# Patient Record
Sex: FEMALE | Race: WHITE | NOT HISPANIC OR LATINO | Employment: FULL TIME | URBAN - METROPOLITAN AREA
[De-identification: names, ages, dates, MRNs, and addresses within clinical notes are randomized per-mention and may not be internally consistent; named-entity substitution may affect disease eponyms.]

---

## 2018-09-06 ENCOUNTER — ANNUAL EXAM (OUTPATIENT)
Dept: OBGYN CLINIC | Facility: CLINIC | Age: 48
End: 2018-09-06
Payer: COMMERCIAL

## 2018-09-06 VITALS
SYSTOLIC BLOOD PRESSURE: 104 MMHG | HEIGHT: 61 IN | DIASTOLIC BLOOD PRESSURE: 62 MMHG | WEIGHT: 125 LBS | BODY MASS INDEX: 23.6 KG/M2

## 2018-09-06 DIAGNOSIS — Z01.419 ENCNTR FOR GYN EXAM (GENERAL) (ROUTINE) W/O ABN FINDINGS: Primary | ICD-10-CM

## 2018-09-06 DIAGNOSIS — R10.2 PELVIC PRESSURE IN FEMALE: ICD-10-CM

## 2018-09-06 DIAGNOSIS — Z12.31 ENCOUNTER FOR SCREENING MAMMOGRAM FOR MALIGNANT NEOPLASM OF BREAST: ICD-10-CM

## 2018-09-06 LAB
BACTERIA UR QL AUTO: NORMAL /HPF
BILIRUB UR QL STRIP: NEGATIVE
CLARITY UR: CLEAR
COLOR UR: YELLOW
GLUCOSE UR STRIP-MCNC: NEGATIVE MG/DL
HGB UR QL STRIP.AUTO: NEGATIVE
HYALINE CASTS #/AREA URNS LPF: NORMAL /LPF
KETONES UR STRIP-MCNC: NEGATIVE MG/DL
LEUKOCYTE ESTERASE UR QL STRIP: NEGATIVE
NITRITE UR QL STRIP: NEGATIVE
NON-SQ EPI CELLS URNS QL MICRO: NORMAL /HPF
PH UR STRIP.AUTO: 7 [PH] (ref 4.5–8)
PROT UR STRIP-MCNC: NEGATIVE MG/DL
RBC #/AREA URNS AUTO: NORMAL /HPF
SP GR UR STRIP.AUTO: 1.01 (ref 1–1.03)
UROBILINOGEN UR QL STRIP.AUTO: 0.2 E.U./DL
WBC #/AREA URNS AUTO: NORMAL /HPF

## 2018-09-06 PROCEDURE — 87624 HPV HI-RISK TYP POOLED RSLT: CPT | Performed by: PHYSICIAN ASSISTANT

## 2018-09-06 PROCEDURE — 81001 URINALYSIS AUTO W/SCOPE: CPT | Performed by: PHYSICIAN ASSISTANT

## 2018-09-06 PROCEDURE — 99396 PREV VISIT EST AGE 40-64: CPT | Performed by: PHYSICIAN ASSISTANT

## 2018-09-06 PROCEDURE — G0145 SCR C/V CYTO,THINLAYER,RESCR: HCPCS | Performed by: PHYSICIAN ASSISTANT

## 2018-09-06 PROCEDURE — 87086 URINE CULTURE/COLONY COUNT: CPT | Performed by: PHYSICIAN ASSISTANT

## 2018-09-06 NOTE — PROGRESS NOTES
Lukas Memos  1970      CC:  Yearly exam    S:  49 y o  female here for yearly exam  Her cycles are regular, not heavy or crampy  She is sexually active  She uses nothing for contraception  She still would very much like a pregnancy  She did undergo another IVF procedure since seen last two years ago, with a donor egg, and that failed as well  She is still considering going to another facility to see about another IVF procedure  We also discussed donor embryo, adoption, and surrogacy  She notes some lower abdominal pressure and low back discomfort for the past six weeks off and on  She is sexually active without pain or bleeding     Last Pap 2015 neg/neg  Last Mammo never    No current outpatient prescriptions on file  Social History     Social History    Marital status: Single     Spouse name: N/A    Number of children: N/A    Years of education: N/A     Occupational History    Not on file  Social History Main Topics    Smoking status: Never Smoker    Smokeless tobacco: Never Used    Alcohol use 1 2 oz/week     2 Glasses of wine per week    Drug use: No    Sexual activity: Yes     Partners: Male     Birth control/ protection: Male Sterilization     Other Topics Concern    Not on file     Social History Narrative    No narrative on file     Family History   Problem Relation Age of Onset    Colon cancer Mother     Hypertension Mother     Diabetes Mother       Past Medical History:   Diagnosis Date    Abnormal Pap smear of cervix     Urinary tract infection         O:  Blood pressure 104/62, height 5' 1 02" (1 55 m), weight 56 7 kg (125 lb), last menstrual period 08/09/2018  Patient appears well and is not in distress  Neck is supple without masses  Breasts are symmetrical without mass, tenderness, nipple discharge, skin changes or adenopathy  Abdomen is soft and nontender without masses  External genitals are normal without lesions or rashes    Vagina is normal without discharge or bleeding  Cervix is normal without discharge or lesion  Uterus is normal, mobile, nontender without palpable mass  Adnexa are normal, nontender, without palpable mass  A:  Yearly exam      P:   Pap and HPV today    Check urinalysis, urine culture    Mammo slip provided    RTO one year for yearly exam or sooner as needed

## 2018-09-07 LAB — BACTERIA UR CULT: NORMAL

## 2018-09-10 LAB
HPV HR 12 DNA CVX QL NAA+PROBE: NEGATIVE
HPV16 DNA CVX QL NAA+PROBE: NEGATIVE
HPV18 DNA CVX QL NAA+PROBE: NEGATIVE
LAB AP GYN PRIMARY INTERPRETATION: NORMAL
Lab: NORMAL

## 2018-12-06 ENCOUNTER — TELEPHONE (OUTPATIENT)
Dept: OBGYN CLINIC | Facility: CLINIC | Age: 48
End: 2018-12-06

## 2018-12-06 DIAGNOSIS — Z31.69 ENCOUNTER FOR PRECONCEPTION CONSULTATION: Primary | ICD-10-CM

## 2018-12-06 NOTE — TELEPHONE ENCOUNTER
Tried to call pt - # on file is not accepting calls  Mailed letter to pt that we are trying to contact her

## 2018-12-06 NOTE — TELEPHONE ENCOUNTER
----- Message from Ray Medina PA-C sent at 12/6/2018  5:08 PM EST -----  Please let [facility] know that I received a letter from her infertility doctor asking me to fill out a form indicating that she is cleared to proceed with IVF  I would like [facility] to have an appointment with [facility] for a preconception visit  Once that is complete, we can then work on filling out this form  Please help her make an appointment with [facility]

## 2018-12-11 NOTE — TELEPHONE ENCOUNTER
Spoke with pt - explained to her that Julia Gabriel recommends she see MFM to a preconception consult first  Referral entered into chart and gave her the number to make an appointment

## 2018-12-12 ENCOUNTER — OFFICE VISIT (OUTPATIENT)
Dept: PERINATAL CARE | Facility: CLINIC | Age: 48
End: 2018-12-12
Payer: COMMERCIAL

## 2018-12-12 VITALS
SYSTOLIC BLOOD PRESSURE: 116 MMHG | HEIGHT: 62 IN | WEIGHT: 127.9 LBS | DIASTOLIC BLOOD PRESSURE: 78 MMHG | BODY MASS INDEX: 23.53 KG/M2 | HEART RATE: 64 BPM

## 2018-12-12 DIAGNOSIS — N97.9 INFERTILITY, FEMALE: Primary | ICD-10-CM

## 2018-12-12 PROCEDURE — 99243 OFF/OP CNSLTJ NEW/EST LOW 30: CPT | Performed by: OBSTETRICS & GYNECOLOGY

## 2018-12-12 NOTE — PROGRESS NOTES
[facility] - Medical Clearance   Bushra Ballesteros 51 y o  female MRN: [id number]  Unit/Bed#:  Encounter: [id number]      History of Present Illness     Chief Complaint: Patient is here for medical clearance for fertility treatment - donor egg  HPI:  Bushra Ballesteros is a 51 y o  Juan Carlos Jin presenting to [facility] for medical clearance for fertility treatment - donor egg  Patient is well appearing and without any current complaints  She reports 3 prior cycles of IVF (failed, D+C, failed)  Denies any personal history of cardiovasuclar/pulmonary disease or history of VTE  For her last IVF cycle (2015), she was evaluated by cardiology and cleared following stress test showing - low risk  Patient reports a balanced diet, exercises regularly, and denies tobacco or illicit drug use, rarely consumes alcohol  OBHx: 3 cycles IVF (1st and 3rd "did not take", 2nd required D+C)    GYNHx: Denies hx of fibroids, ovarian cysts; reports hx of HPV + on pap "many years'" ago, however, multiple normal paps  No hx of cervical procedures/surgery  She was on OCPs(20 -30s), depo (early 35s) then used condoms  Her  has had a vasectomy but proven male fertility  FMHx: Maternal - DM; hx of LLE DVT at 79yo: patient's mother was subsequently placed on Xarelto, after which time she developed a GI bleed and was found to have intestinal cancer on colonoscopy - likely VTE inciting event  Following biopsy at [facility], patient was not informed of any genetic component to cancer status  Paternal: unknown    Review of Systems   Constitutional: Negative for chills, diaphoresis, fatigue and fever  HENT: Negative for rhinorrhea, sinus pain, sinus pressure, sneezing, sore throat and tinnitus  Respiratory: Negative for cough, chest tightness, shortness of breath and wheezing  Cardiovascular: Negative for chest pain, palpitations and leg swelling     Gastrointestinal: Negative for abdominal pain, constipation, diarrhea, nausea and vomiting  Genitourinary: Negative for dysuria, frequency, genital sores, vaginal bleeding, vaginal discharge and vaginal pain  Musculoskeletal: Negative for arthralgias, back pain, myalgias and neck stiffness  Neurological: Negative for dizziness, weakness, light-headedness and headaches  Psychiatric/Behavioral: Negative for agitation, behavioral problems, self-injury and suicidal ideas  The patient is not nervous/anxious  Historical Information   Past Medical History:   Diagnosis Date    Abnormal Pap smear of cervix     Urinary tract infection      Past Surgical History:   Procedure Laterality Date    DILATION AND CURETTAGE OF UTERUS  2015    WISDOM TOOTH EXTRACTION       Social History   History   Alcohol Use    1 2 oz/week    2 Glasses of wine per week     History   Drug Use No     History   Smoking Status    Never Smoker   Smokeless Tobacco    Never Used     Family History: Maternal DM; DVT; as reported above    Meds/Allergies       No Known Allergies    OBJECTIVE:    Vitals: Blood pressure 116/78, pulse 64, height 5' 2" (1 575 m), weight 58 kg (127 lb 14 4 oz), last menstrual period 08/09/2018  Body mass index is 23 39 kg/m²  Physical Exam   Constitutional: She is oriented to person, place, and time  She appears well-developed and well-nourished  No distress  Eyes: EOM are normal  Right eye exhibits no discharge  Left eye exhibits no discharge  No scleral icterus  Neck: No JVD present  No tracheal deviation present  No thyromegaly present  Cardiovascular: Normal rate, regular rhythm, normal heart sounds and intact distal pulses  Pulmonary/Chest: Effort normal and breath sounds normal  No respiratory distress  She has no wheezes  Lymphadenopathy:     She has no cervical adenopathy  Neurological: She is alert and oriented to person, place, and time  She exhibits normal muscle tone  Coordination normal    Skin: Skin is warm and dry  No rash noted   She is not diaphoretic  No erythema  Psychiatric: She has a normal mood and affect  Her behavior is normal  Judgment and thought content normal       PREVENTATIVE CARE TESTING:         Ref Range & Units 18 1808   HPV Other HR Negative Negative    Comment: HPV types: 44,69,40,44,19,00,43,84,78,12,17 and 68 DNA are undetectable or below the pre-set threshold  HPV16 Negative Negative    HPV18 Negative Negative           Negative for intraepithelial lesion or malignancy    Last Mammo: 9/11/15  No dominant soft tissue mass, architectural distortion or    suspicious calcifications are noted  The skin and nipple    contours are within normal limits  IMPRESSION-    No evidence of malignancy  No significant changes   when compared with prior studies  ASSESSMENT- BiRad-1 - Negative     Assessment/Plan     [facility] 51 y o  , healthy premenopausal female for donor egg IVF      - She medically cleared to proceed with this fertility treatment for our standpoint    - Discussed with patient the potential risks associated with maternal age of 51 and her [address] of DM -- including developing hypertensive disease, diabetes, preeclampsia, VTE, placental pathology, maternal morbidity, fetal demise, and increased risk for  delivery  - Patient states that she understands all topics as discussed above and desires to proceed with treatment  - Patient is scheduled for mammo through her Humberto Monae MD  OB/GYN PGY-3  2018 3:05 PM

## 2018-12-18 ENCOUNTER — TELEPHONE (OUTPATIENT)
Dept: OBGYN CLINIC | Facility: CLINIC | Age: 48
End: 2018-12-18

## 2018-12-18 ENCOUNTER — TRANSCRIBE ORDERS (OUTPATIENT)
Dept: REGISTRATION | Facility: HOSPITAL | Age: 48
End: 2018-12-18

## 2018-12-18 LAB — HCV AB SER-ACNC: NORMAL

## 2018-12-18 NOTE — TELEPHONE ENCOUNTER
Spoke with pt - she was given a script from the  center to have a hysterosalpingogram (HSG)  Advised patient she will need to call central scheduling to make that appointment since they ordered it  Transferred pt to CS

## 2018-12-18 NOTE — TELEPHONE ENCOUNTER
Pt was last seen 9/2018 by Charlotte Odom, [address] states she needs to have an HCG  done within the next two days per DR Augusto Corcoran ( Her fertility doctor), pls call pt to advise if her fertility doc should get this or we will

## 2018-12-19 ENCOUNTER — TRANSCRIBE ORDERS (OUTPATIENT)
Dept: ADMINISTRATIVE | Facility: HOSPITAL | Age: 48
End: 2018-12-19

## 2018-12-19 DIAGNOSIS — Z31.41 FERTILITY TESTING: Primary | ICD-10-CM

## 2019-01-07 ENCOUNTER — HOSPITAL ENCOUNTER (OUTPATIENT)
Dept: RADIOLOGY | Facility: HOSPITAL | Age: 49
Discharge: HOME | End: 2019-01-07
Payer: COMMERCIAL

## 2019-01-07 VITALS — HEIGHT: 62 IN | WEIGHT: 125 LBS | BODY MASS INDEX: 23 KG/M2

## 2019-01-07 DIAGNOSIS — Z12.31 ENCOUNTER FOR SCREENING MAMMOGRAM FOR MALIGNANT NEOPLASM OF BREAST: ICD-10-CM

## 2019-01-07 PROCEDURE — 77067 SCR MAMMO BI INCL CAD: CPT

## 2019-08-26 ENCOUNTER — INITIAL PRENATAL (OUTPATIENT)
Dept: OBGYN CLINIC | Facility: CLINIC | Age: 49
End: 2019-08-26

## 2019-08-26 VITALS — HEIGHT: 62 IN | BODY MASS INDEX: 23.48 KG/M2 | WEIGHT: 127.6 LBS

## 2019-08-26 DIAGNOSIS — Z34.81 PRENATAL CARE, SUBSEQUENT PREGNANCY, FIRST TRIMESTER: Primary | ICD-10-CM

## 2019-08-26 PROCEDURE — OBC: Performed by: OBSTETRICS & GYNECOLOGY

## 2019-08-26 RX ORDER — PNV NO.95/FERROUS FUM/FOLIC AC 28MG-0.8MG
TABLET ORAL
COMMUNITY
End: 2021-06-10 | Stop reason: ALTCHOICE

## 2019-08-26 RX ORDER — ESTRADIOL 1 MG/1
1 TABLET ORAL 3 TIMES DAILY
COMMUNITY
End: 2019-09-10 | Stop reason: ALTCHOICE

## 2019-08-26 NOTE — PROGRESS NOTES
OB INTAKE INTERVIEW  * Pt presents for OB intake  * Accompanied by: self  *  *Hx of  delivery prior to 36 weeks 6 days no  *Last Menstrual Period: Pt's LMP was 5/10/19  *Ultrasound date:19   9weeks 0days  *Estimated date of delivery: 3/23/20   * confirmed by US    *Signs/Symptoms of Pregnancy      *constipation no   *headaches no   *cramping/spotting no   *PICA cravings no  *Diabetes- if you answer yes, please order 1 hour GTT, 50g   *hx of GDM no   *BMI >35 no   *first degree relative with type 2 diabetes yes   *hx of PCOS no   *current metformin use no   *prior hx of LGA/macrosomia no   *AMA with other risk factors yes  *Hypertension- if you answer yes, please order preeclampsia labs including 24 hour urine protein   *Hx of chronic HTN no   *hx of gestational HTN no   *hx of preeclampsia, eclampsia, or HELLP syndrome no  *Infection Screening-    *does the pt have a hx of MRSA? no   *if yes- please follow MRSA protocol and obtain a nasal swab for MRSA culture   *history of herpes? no  *Immunizations:   *influenza vaccine given today no   *discussed Tdap vaccine    *Interview education   *[facility] Pregnancy Essentials Book reviewed and discussed   *Handouts given:    *Baby and Me phone joshua guide    *Baby and Me support center    *[facility]     *discussed genetic testing- pt interested yes     *appointment at [facility] made yes    *Prenatal lab work scripts    *Nurse/Family Partnership- pt may qualify no; referral placed no  *I have these concerns about this prenatal patient: IVF pt  *Details that I feel the provider should be aware of: egg donor    PN1 visit scheduled  The patient was oriented to our practice and all questions were answered  [facility]  IVF, egg donor  Records & labs scanned to chart  appt scheduled at [facility]  Referral entered  pn bldwk ordered  - ua , C &S, & one hour glucola  Pn 1 visit scheduled      Interviewed by: Lyle Wright Zack Cast

## 2019-09-09 ENCOUNTER — APPOINTMENT (OUTPATIENT)
Dept: LAB | Facility: CLINIC | Age: 49
End: 2019-09-09
Payer: COMMERCIAL

## 2019-09-09 DIAGNOSIS — Z34.81 PRENATAL CARE, SUBSEQUENT PREGNANCY, FIRST TRIMESTER: ICD-10-CM

## 2019-09-09 LAB
BACTERIA UR QL AUTO: ABNORMAL /HPF
BILIRUB UR QL STRIP: NEGATIVE
CAOX CRY URNS QL MICRO: ABNORMAL /HPF
CLARITY UR: ABNORMAL
COLOR UR: YELLOW
GLUCOSE 1H P 50 G GLC PO SERPL-MCNC: 139 MG/DL
GLUCOSE UR STRIP-MCNC: NEGATIVE MG/DL
HGB UR QL STRIP.AUTO: NEGATIVE
KETONES UR STRIP-MCNC: NEGATIVE MG/DL
LEUKOCYTE ESTERASE UR QL STRIP: NEGATIVE
NITRITE UR QL STRIP: NEGATIVE
NON-SQ EPI CELLS URNS QL MICRO: ABNORMAL /HPF
PH UR STRIP.AUTO: 6 [PH]
PROT UR STRIP-MCNC: NEGATIVE MG/DL
RBC #/AREA URNS AUTO: ABNORMAL /HPF
SP GR UR STRIP.AUTO: 1.02 (ref 1–1.03)
UROBILINOGEN UR QL STRIP.AUTO: 0.2 E.U./DL
WBC #/AREA URNS AUTO: ABNORMAL /HPF

## 2019-09-09 PROCEDURE — 36415 COLL VENOUS BLD VENIPUNCTURE: CPT

## 2019-09-09 PROCEDURE — 81001 URINALYSIS AUTO W/SCOPE: CPT

## 2019-09-09 PROCEDURE — 87086 URINE CULTURE/COLONY COUNT: CPT

## 2019-09-09 PROCEDURE — 82950 GLUCOSE TEST: CPT

## 2019-09-10 ENCOUNTER — TELEPHONE (OUTPATIENT)
Dept: OBGYN CLINIC | Facility: CLINIC | Age: 49
End: 2019-09-10

## 2019-09-10 ENCOUNTER — INITIAL PRENATAL (OUTPATIENT)
Dept: OBGYN CLINIC | Facility: CLINIC | Age: 49
End: 2019-09-10
Payer: COMMERCIAL

## 2019-09-10 VITALS — WEIGHT: 130 LBS | DIASTOLIC BLOOD PRESSURE: 70 MMHG | SYSTOLIC BLOOD PRESSURE: 112 MMHG | BODY MASS INDEX: 23.78 KG/M2

## 2019-09-10 DIAGNOSIS — Z23 NEED FOR INFLUENZA VACCINATION: Primary | ICD-10-CM

## 2019-09-10 DIAGNOSIS — O99.810 ABNORMAL GLUCOSE AFFECTING PREGNANCY: ICD-10-CM

## 2019-09-10 DIAGNOSIS — O09.519 HIGH-RISK PREGNANCY, PRIMIGRAVIDA OF ADVANCED MATERNAL AGE: ICD-10-CM

## 2019-09-10 DIAGNOSIS — O09.819 PREGNANCY RESULTING FROM ASSISTED REPRODUCTIVE TECHNOLOGY, ANTEPARTUM: ICD-10-CM

## 2019-09-10 DIAGNOSIS — R73.09 ELEVATED GLUCOSE LEVEL: Primary | ICD-10-CM

## 2019-09-10 DIAGNOSIS — O09.91 PREGNANCY, SUPERVISION, HIGH-RISK, FIRST TRIMESTER: ICD-10-CM

## 2019-09-10 DIAGNOSIS — Z34.91 ENCOUNTER FOR PREGNANCY RELATED EXAMINATION IN FIRST TRIMESTER: ICD-10-CM

## 2019-09-10 LAB — BACTERIA UR CULT: NORMAL

## 2019-09-10 PROCEDURE — PNV: Performed by: NURSE PRACTITIONER

## 2019-09-10 PROCEDURE — 90471 IMMUNIZATION ADMIN: CPT | Performed by: NURSE PRACTITIONER

## 2019-09-10 PROCEDURE — 90686 IIV4 VACC NO PRSV 0.5 ML IM: CPT | Performed by: NURSE PRACTITIONER

## 2019-09-10 NOTE — PROGRESS NOTES
Problem List Items Addressed This Visit        Other    Pregnancy, supervision, high-risk, first trimester     PN1  Planned pregnancy resulting from IVF with donor egg  Some records present under media tab from [facility]    #1: IVF - 1st trimester MAB -> D&C     See additional subheadings re: the following:  - AMA  - abn glucola   - h/o ART    **EDC of 3/25/20 entered per 1st trimester US report under media tab  The patient denies complaints  Denies pelvic pain, bleeding, LOF  Exam WNL   by limited bedside US  Prenatal labs WNL  Rh pos  RPR added today, as this was not previously ordered, and baseline preE labs were also advised  Pap up to date and gc/chl sent today  Scheduled with MFM tomorrow for genetics counseling and perinatology consultation to discuss management of high risk preg  Reviewed diet and activity recommendations, practice set up, visit intervals and reasons to call  RTO in 4 weeks or sooner PRN  **Flu vaccine administered today  Relevant Orders    RPR    Abnormal glucose affecting pregnancy     Elevated 1hr glucola  Recommened 3hr gtt  Relevant Orders    Glucose KODI 3HR 100GM PREG PTS    High-risk pregnancy, primigravida of advanced maternal age     Reviewed AMA considerations at length  Oksana Abril reports she is scheduled for genetics and  consultation at [facility] tomorrow  She reports genetic testing was not performed on donor eggs or embryos, and she intends to proceed with cffDNA  Advised baseline preE labs at this time  Will defer to MFM for daily LDASA recommendations  Recommended low threshold for reporting any complaints  Emotional support provided, as she is understandably anxious about the health of the pregnancy            Relevant Orders    Protein / creatinine ratio, urine    Uric acid    Comprehensive metabolic panel    Pregnancy resulting from assisted reproductive technology, antepartum     Current preg resulting from IVF with donor egg  Discussed recommendations for fetal echo at 24 weeks              Other Visit Diagnoses     Need for influenza vaccination    -  Primary    Relevant Orders    FLUZONE: influenza vaccine, quadrivalent, 0 5 mL (Completed)    Encounter for pregnancy related examination in first trimester        Relevant Orders    POCT urine dip

## 2019-09-10 NOTE — TELEPHONE ENCOUNTER
Patient is aware of her results and that she needs to go for a 3hr GTT due to elevated glucose  Explained it is fasting and done at 3 locations - [facility], [facility]  Order in pt chart

## 2019-09-10 NOTE — TELEPHONE ENCOUNTER
----- Message from Anurag oK MD sent at 9/10/2019  2:47 PM EDT -----  Elevated 1 hour glucola, will need 3 h GTT

## 2019-09-10 NOTE — ASSESSMENT & PLAN NOTE
PN1  Planned pregnancy resulting from IVF with donor egg  Some records present under media tab from [facility]    #1: IVF - 1st trimester MAB -> D&C     See additional subheadings re: the following:  - AMA  - abn glucola   - h/o ART    **EDC of 3/25/20 entered per 1st trimester US report under media tab  The patient denies complaints  Denies pelvic pain, bleeding, LOF  Exam WNL   by limited bedside US  Prenatal labs WNL  Rh pos  RPR added today, as this was not previously ordered, and baseline preE labs were also advised  Pap up to date and gc/chl sent today  Scheduled with MFM tomorrow for genetics counseling and perinatology consultation to discuss management of high risk preg  Reviewed diet and activity recommendations, practice set up, visit intervals and reasons to call  RTO in 4 weeks or sooner PRN  **Flu vaccine administered today

## 2019-09-10 NOTE — ASSESSMENT & PLAN NOTE
Reviewed AMA considerations at length  Jak Moctezuma reports she is scheduled for genetics and  consultation at [facility] tomorrow  She reports genetic testing was not performed on donor eggs or embryos, and she intends to proceed with cffDNA  Advised baseline preE labs at this time  Will defer to MFM for daily LDASA recommendations  Recommended low threshold for reporting any complaints  Emotional support provided, as she is understandably anxious about the health of the pregnancy

## 2019-09-11 ENCOUNTER — ROUTINE PRENATAL (OUTPATIENT)
Dept: PERINATAL CARE | Facility: CLINIC | Age: 49
End: 2019-09-11
Payer: COMMERCIAL

## 2019-09-11 VITALS
BODY MASS INDEX: 24 KG/M2 | SYSTOLIC BLOOD PRESSURE: 104 MMHG | HEIGHT: 62 IN | WEIGHT: 130.4 LBS | HEART RATE: 73 BPM | DIASTOLIC BLOOD PRESSURE: 71 MMHG

## 2019-09-11 DIAGNOSIS — O09.521 MULTIGRAVIDA OF ADVANCED MATERNAL AGE IN FIRST TRIMESTER: Primary | ICD-10-CM

## 2019-09-11 DIAGNOSIS — Z34.81 PRENATAL CARE, SUBSEQUENT PREGNANCY, FIRST TRIMESTER: ICD-10-CM

## 2019-09-11 DIAGNOSIS — O09.819 PREGNANCY RESULTING FROM ASSISTED REPRODUCTIVE TECHNOLOGY, ANTEPARTUM: ICD-10-CM

## 2019-09-11 DIAGNOSIS — Z36.82 ENCOUNTER FOR NUCHAL TRANSLUCENCY TESTING: ICD-10-CM

## 2019-09-11 DIAGNOSIS — Z3A.12 12 WEEKS GESTATION OF PREGNANCY: ICD-10-CM

## 2019-09-11 LAB
SL AMB  POCT GLUCOSE, UA: NEGATIVE
SL AMB POCT URINE PROTEIN: NEGATIVE

## 2019-09-11 PROCEDURE — 76801 OB US < 14 WKS SINGLE FETUS: CPT | Performed by: OBSTETRICS & GYNECOLOGY

## 2019-09-11 PROCEDURE — 99242 OFF/OP CONSLTJ NEW/EST SF 20: CPT | Performed by: OBSTETRICS & GYNECOLOGY

## 2019-09-11 PROCEDURE — 76813 OB US NUCHAL MEAS 1 GEST: CPT | Performed by: OBSTETRICS & GYNECOLOGY

## 2019-09-11 RX ORDER — ASPIRIN 81 MG/1
162 TABLET ORAL DAILY
Qty: 180 TABLET | Refills: 3 | Status: SHIPPED | OUTPATIENT
Start: 2019-09-11 | End: 2020-03-21 | Stop reason: HOSPADM

## 2019-09-11 NOTE — PROGRESS NOTES
CONSULT NOTE    Carlos Guzman MD  [address] [facility], [address]     Thank you for referring your Freida Amado for a Maternal-Fetal Medicine Consultation:  Below is my consultation  Thank you very much for requesting a consultation on this very nice patient for the indication of genetic screening in the setting of advanced maternal age  Denisa Avina is 51years old  She will be 49 at her EDC  This pregnancy was conceived utilizing in vitro fertilization from an egg donor who is 32years old  The father of the baby is 54years old  The patient did not undergo prenatal genetic screening but did undergo Universal carrier screening  She has a history of 2 prior first-trimester miscarriages with similarly conceived pregnancies  The patient denies any significant medical or surgical history otherwise  She denies the current use of tobacco, alcohol, or drugs  She currently takes prenatal vitamins and has no known drug allergies  There is a family history significant for diabetes  The patient was found to be a carrier for alpha thalassemia  A review of systems is otherwise negative  We discussed the options for genetic screening, including but not limited to first trimester screening, second trimester screening, combined first and second trimester screening, noninvasive prenatal testing (NIPT) for patients at high risk and diagnostic screening through the use of CVS and amniocentesis  We discussed the risks and benefits of each approach including the sensitivities and false positive rates as well as the difference between a screening test and a diagnostic test   At the conclusion of our discussion the patient elected Sequential Screening to delineate her risk for fetal aneuploidy  A maternal blood sample was obtained on the day of the ultrasound    The first trimester portion of the screening results, encompassing age, nuchal translucency, and biochemistry should be available within one week of testing and will be reported from [facility]   The second stage of sequential screening should be completed between the 15th and 21st week of pregnancy (ideally between 16-18 weeks)  We will call the patient with any and all results and the results should be available in the EMR  We discussed that in vitro fertilization increases the risk for fetal congenital heart disease and recommend a fetal echocardiogram between 22 and 24 weeks  Given the patient's history of nulliparity, AMA, Donor Ovum, I recommend initiating low dose aspirin therapy  A recent meta-analysis yielded risk reductions of 24% for preeclampsia, 20% for intrauterine growth restriction, and 14% for  birth, with an absolute risk reduction of 2-5% for preeclampsia, one to 5% for intrauterine growth restriction, and 2-4% for  birth  In this study, there was no identified risk of harm to the mother or fetus but long-term evidence was somewhat limited  Given the overall safety profile and risk-benefit analysis, I recommend 162 mg of aspirin be taken Daily until delivery  I reviewed these recommendations with the patient and answered all of her questions to apparent satisfaction  We discussed the increased risks of adverse outcomes in those pregnancies carried by mild greater than the age of 36 and especially the age of 39  There appears to be an increased risk for adverse outcomes often times related to, but not limited to, comorbidities including hypertension and diabetes  The patient will have baseline preeclampsia labs drawn and she had early screening for diabetes  Her 1 hour glucose was slightly elevated at 137 and she will complete a 3 hour glucose tolerance test   If that is normal, repeat 3 hour glucose tolerance testing is recommended at around 26 weeks    Despite no comorbidities, there does appear to be an increased risk for fetal growth restriction and stillbirth secondary to    We discussed follow-up in detail and I recommend an anatomy ultrasound be scheduled for 20 weeks gestation  An echocardiogram will be performed at around 22 weeks  Serial growth evaluations thereafter with  surveillance near term  Thank you very much for allowing us to participate in the care of this very nice patient  Should you have any questions, please do not hesitate to contact our office  Please note, in addition to the time spent discussing the results of the ultrasound, I spent approximately 30 minutes of face-to-face time with the patient, greater than 50% of which was spent in counseling and the coordination of care for this patient  Portions of the record may have been created with voice recognition software  Occasional wrong word or "sound a like" substitutions may have occurred due to the inherent limitations of voice recognition software  Read the chart carefully and recognize, using context, where substitutions have occurred  Hao Espinoza MD  Attending Physician, Nisa

## 2019-09-17 ENCOUNTER — TELEPHONE (OUTPATIENT)
Dept: PERINATAL CARE | Facility: CLINIC | Age: 49
End: 2019-09-17

## 2019-09-17 NOTE — LETTER
09/17/19  Marshfield Puffer  1970    Thank you for completing Part 1 of your Sequential Screen  To obtain a complete test result, please complete blood work for Part 2 Sequential Screen between the weeks of 10/5/19 to 10/19/19  Based on your insurance coverage, please use one of the following locations  Call our office for any questions at [phone]      [address]   [address] [facility], [facility], [address]    Phone: [address]  [address] [facility], [address]    Phone: [address], [address] [facility]  Phone: [phone] [address], located within [facility], [address]   Phone: [phone] (*ask for lab)    [facility]  [address], [facility], [address] [facility]  Phone: [phone]  Hours: Monday-Friday 6a-6p, Saturday 7a-12p    [address] [facility]  [address]  [facility] [address], [address] [facility]; [place], [address]   Phone: [address] [facility] [address]  [address] [facility] [address]   Phone: [phone]    Sincerely,    Jah Del Cid RN

## 2019-09-17 NOTE — TELEPHONE ENCOUNTER
----- Message from Elissa Arguello MD sent at 9/16/2019  4:02 PM EDT -----  I have reviewed the results which are low risk  Please call patient and notify her of reassuring results if she has not viewed on [facility]  Thank you

## 2019-09-17 NOTE — TELEPHONE ENCOUNTER
I left a message for Johnny Christianson regarding part 1 of her sequential screen results, which was   WNL  I also informed her of the optimal dates to get part 2 drawn and offered the phone number for questions

## 2019-09-22 ENCOUNTER — TELEPHONE (OUTPATIENT)
Dept: LABOR AND DELIVERY | Facility: HOSPITAL | Age: 49
End: 2019-09-22

## 2019-09-22 NOTE — TELEPHONE ENCOUNTER
Pt called afterhours with postcoital bleeding at 13+ weeks  Not heavy flow, but more than spotting   Discussed likely secondary to cervical trauma, less likely SAb, but encouraged pt to continue to monitor, call back if not improved in next couple of hours

## 2019-09-26 NOTE — TELEPHONE ENCOUNTER
Patient called back - 14w1d,  - stated she is no longer bleeding but is having brown spotting   Wants to know if normal

## 2019-09-27 NOTE — TELEPHONE ENCOUNTER
patient returned my call  Pt stated she is no longer bleeding and no longer having brown spotting as of 12 hours  Pt advised old blood normal  Pt has new concern stating its now irritated and itchy external and labia at night time only  No discharge but before bleeding was having clear discharge  Pt states theres a slight odor not fishy but a hormonal before period kind of smell  Pt states has not used any new soaps  Pt confirmed pharmacy on file  Please advise

## 2019-09-30 ENCOUNTER — TELEPHONE (OUTPATIENT)
Dept: OBGYN CLINIC | Facility: CLINIC | Age: 49
End: 2019-09-30

## 2019-09-30 NOTE — TELEPHONE ENCOUNTER
patient returned my call - advised as directed  Pt grateful for the call back and agreed   Pt is scheduled in [facility] for next week on 10/08 1:40pm

## 2019-09-30 NOTE — TELEPHONE ENCOUNTER
----- Message from Daily Anton MD sent at 9/27/2019  4:15 PM EDT -----  Please let the patient know that her symptoms are normal after having some bleeding and that she should not be concerned  She should have follow up at some point next week

## 2019-10-07 ENCOUNTER — APPOINTMENT (OUTPATIENT)
Dept: LAB | Facility: HOSPITAL | Age: 49
End: 2019-10-07
Payer: COMMERCIAL

## 2019-10-07 DIAGNOSIS — O09.91 PREGNANCY, SUPERVISION, HIGH-RISK, FIRST TRIMESTER: ICD-10-CM

## 2019-10-07 DIAGNOSIS — O99.810 ABNORMAL GLUCOSE AFFECTING PREGNANCY: ICD-10-CM

## 2019-10-07 DIAGNOSIS — O09.519 HIGH-RISK PREGNANCY, PRIMIGRAVIDA OF ADVANCED MATERNAL AGE: ICD-10-CM

## 2019-10-07 DIAGNOSIS — R73.09 ELEVATED GLUCOSE LEVEL: ICD-10-CM

## 2019-10-07 LAB
ALBUMIN SERPL BCP-MCNC: 2.9 G/DL (ref 3.5–5)
ALP SERPL-CCNC: 41 U/L (ref 46–116)
ALT SERPL W P-5'-P-CCNC: 43 U/L (ref 12–78)
ANION GAP SERPL CALCULATED.3IONS-SCNC: 4 MMOL/L (ref 4–13)
AST SERPL W P-5'-P-CCNC: 25 U/L (ref 5–45)
BILIRUB SERPL-MCNC: 0.92 MG/DL (ref 0.2–1)
BUN SERPL-MCNC: 7 MG/DL (ref 5–25)
CALCIUM SERPL-MCNC: 8.7 MG/DL (ref 8.3–10.1)
CHLORIDE SERPL-SCNC: 109 MMOL/L (ref 100–108)
CO2 SERPL-SCNC: 25 MMOL/L (ref 21–32)
CREAT SERPL-MCNC: 0.66 MG/DL (ref 0.6–1.3)
CREAT UR-MCNC: 219 MG/DL
GFR SERPL CREATININE-BSD FRML MDRD: 104 ML/MIN/1.73SQ M
GLUCOSE 1H P 100 G GLC PO SERPL-MCNC: 135 MG/DL (ref 65–179)
GLUCOSE 2H P 100 G GLC PO SERPL-MCNC: 152 MG/DL (ref 65–154)
GLUCOSE 3H P 100 G GLC PO SERPL-MCNC: 111 MG/DL (ref 65–139)
GLUCOSE P FAST SERPL-MCNC: 95 MG/DL (ref 65–99)
GLUCOSE P FAST SERPL-MCNC: 97 MG/DL (ref 65–99)
POTASSIUM SERPL-SCNC: 4.1 MMOL/L (ref 3.5–5.3)
PROT SERPL-MCNC: 6.3 G/DL (ref 6.4–8.2)
PROT UR-MCNC: 16 MG/DL
PROT/CREAT UR: 0.07 MG/G{CREAT} (ref 0–0.1)
RPR SER QL: NORMAL
SODIUM SERPL-SCNC: 138 MMOL/L (ref 136–145)
URATE SERPL-MCNC: 3.5 MG/DL (ref 2–6.8)

## 2019-10-07 PROCEDURE — 36415 COLL VENOUS BLD VENIPUNCTURE: CPT

## 2019-10-07 PROCEDURE — 80053 COMPREHEN METABOLIC PANEL: CPT

## 2019-10-07 PROCEDURE — 82570 ASSAY OF URINE CREATININE: CPT

## 2019-10-07 PROCEDURE — 82952 GTT-ADDED SAMPLES: CPT

## 2019-10-07 PROCEDURE — 84550 ASSAY OF BLOOD/URIC ACID: CPT

## 2019-10-07 PROCEDURE — 82951 GLUCOSE TOLERANCE TEST (GTT): CPT

## 2019-10-07 PROCEDURE — 84156 ASSAY OF PROTEIN URINE: CPT

## 2019-10-07 PROCEDURE — 86592 SYPHILIS TEST NON-TREP QUAL: CPT

## 2019-10-08 ENCOUNTER — ROUTINE PRENATAL (OUTPATIENT)
Dept: OBGYN CLINIC | Facility: CLINIC | Age: 49
End: 2019-10-08

## 2019-10-08 VITALS — SYSTOLIC BLOOD PRESSURE: 104 MMHG | DIASTOLIC BLOOD PRESSURE: 56 MMHG | WEIGHT: 133 LBS | BODY MASS INDEX: 24.33 KG/M2

## 2019-10-08 DIAGNOSIS — R73.01 ELEVATED FASTING BLOOD SUGAR: ICD-10-CM

## 2019-10-08 DIAGNOSIS — O09.519 HIGH-RISK PREGNANCY, PRIMIGRAVIDA OF ADVANCED MATERNAL AGE: Primary | ICD-10-CM

## 2019-10-08 PROCEDURE — PNV: Performed by: OBSTETRICS & GYNECOLOGY

## 2019-10-08 NOTE — PROGRESS NOTES
Patient didn't pass her 3 hour GTT  Referred to [facility]  Discuss recent lab work  Did sequential screening and going for part 2 soon  Level 2 scheduled  Had flu shot

## 2019-10-09 ENCOUNTER — TELEPHONE (OUTPATIENT)
Dept: OBGYN CLINIC | Facility: CLINIC | Age: 49
End: 2019-10-09

## 2019-10-09 DIAGNOSIS — R73.01 ELEVATED FASTING GLUCOSE: Primary | ICD-10-CM

## 2019-10-09 NOTE — TELEPHONE ENCOUNTER
Patient is aware of her results and that she is being referred to [facility] due to elevated fasting glucose  Referral entered into chart

## 2019-10-09 NOTE — TELEPHONE ENCOUNTER
----- Message from Monse Rivas, [address] sent at 10/9/2019 10:38 AM EDT -----  Fasting values of 3hr gtt is 95  Please refer to [facility]   Thanks

## 2019-10-14 ENCOUNTER — OFFICE VISIT (OUTPATIENT)
Dept: PERINATAL CARE | Facility: CLINIC | Age: 49
End: 2019-10-14
Payer: COMMERCIAL

## 2019-10-14 VITALS
WEIGHT: 132.8 LBS | SYSTOLIC BLOOD PRESSURE: 108 MMHG | BODY MASS INDEX: 24.44 KG/M2 | DIASTOLIC BLOOD PRESSURE: 69 MMHG | HEART RATE: 80 BPM | HEIGHT: 62 IN

## 2019-10-14 DIAGNOSIS — R73.01 ELEVATED FASTING GLUCOSE: ICD-10-CM

## 2019-10-14 DIAGNOSIS — Z3A.16 16 WEEKS GESTATION OF PREGNANCY: ICD-10-CM

## 2019-10-14 DIAGNOSIS — O24.410 DIET CONTROLLED GESTATIONAL DIABETES MELLITUS (GDM) IN SECOND TRIMESTER: Primary | ICD-10-CM

## 2019-10-14 DIAGNOSIS — O24.419 GESTATIONAL DIABETES MELLITUS (GDM) IN SECOND TRIMESTER, GESTATIONAL DIABETES METHOD OF CONTROL UNSPECIFIED: ICD-10-CM

## 2019-10-14 DIAGNOSIS — O99.810 ABNORMAL GLUCOSE AFFECTING PREGNANCY: Primary | ICD-10-CM

## 2019-10-14 PROCEDURE — G0108 DIAB MANAGE TRN  PER INDIV: HCPCS | Performed by: DIETITIAN, REGISTERED

## 2019-10-14 PROCEDURE — 99214 OFFICE O/P EST MOD 30 MIN: CPT | Performed by: NURSE PRACTITIONER

## 2019-10-14 RX ORDER — BLOOD SUGAR DIAGNOSTIC
STRIP MISCELLANEOUS
Qty: 100 EACH | Refills: 3 | Status: SHIPPED | OUTPATIENT
Start: 2019-10-14 | End: 2020-01-09 | Stop reason: SDUPTHER

## 2019-10-14 NOTE — PATIENT INSTRUCTIONS
1  Start self monitoring blood glucose fasting and 2 hours after start of each meal  Keep glucose log  Glucose goals: fasting 60-90 mg/dL, 135 mg/dL or less 1 hour post meals, and 120 mg/dL or less 2 hours post meal    2  Report glucose readings weekly via [employer]  Report next Monday, 10/21/19 or sooner if needed  3  Start GDM diet with 3 meals and 3 snacks including recommended combination of carb, protein and fat per meal/snack  4  Please eat meal or snack every 2-3 5 hours during the day  5  No more than 8 to 10 hours of fasting overnight  6  Stay active if no restriction from your OB, walk up to 30 minutes a day  7  Always have glucose available to treat hypoglycemia  Use 15:15 rule  Refer to hypoglycemia patient education sheet  Test blood sugar when experiencing signs and symptoms of hypoglycemia and prior to driving  8  Continue prenatal vitamin and baby aspirin as recommended  9  Continue follow-up with your OB and MFM as recommended  10  Check A1c and TSH with reflex free T4  A1c goal 5 6% or less  11  Follow up in: 3 months or sooner if needed

## 2019-10-14 NOTE — PROGRESS NOTES
DATE: 10/14/19   RE: Rocío Fleming   : 1970  JOSE: Estimated Date of Delivery: 3/25/20  EGA:  16w5d    Dear Drs  At [facility]:     Thank you for referring your patient to the Diabetes and Pregnancy Program at [address] [place] [address]  The patient was seen today for medical nutrition therapy for the treatment of gestational  diabetes during pregnancy  In addition to diabetes, the nutrition status is complicated by advanced maternal age  The following was reviewed with the patient:      Weight gain during in pregnancy  Based on the patients height of 62  inches, pre-pregnancy weight of 125 pounds (BMI 22 9) we would recommend a total weight gain of 25-35 pounds for the pregnancy  o The patients current weight is 132 75   pounds, and her weight gain to date is 7 75 pounds   Basic review of macronutrients   Meal pattern should consist of three small meals and three snacks daily   Carbohydrate gram amounts per meal    Instructions on how to read a food label   Appropriate serving size of foods   Incorporating protein at each meal and snack in the importance of protein in relationship to blood glucose control   Individualized meal plan: 1800 calorie gestational diabetes diet   Use of food diary to maintain a meal plan   Sick day guidelines and hypoglycemia with treatment   Breastfeeding guidelines   Post-partum diet recommendations   Report blood glucose levels to [address] weekly or as directed  o Phone: [phone]  If no response in 24 hours, call [phone]   o Fax: [phone]  o Email: [email or web address]   Follow up: Wednesday, 19    Thank you for the opportunity to participate in the care of this patient  I can be reached at [phone] should you have any questions  Time spent with patient 2:10-3:05 PM; time spent face to face counseling greater than 50% of the appointment      Sincerely,     Laura Samayoa  Diabetes Educator  Diabetes and Pregnancy Program

## 2019-10-14 NOTE — PROGRESS NOTES
Assessment/Plan:     Diagnoses and all orders for this visit:    Abnormal glucose affecting pregnancy  -     Hemoglobin A1C; Standing  -     TSH, 3rd generation with Free T4 reflex; Future  -     ONETOUCH VERIO test strip; Test 4 times a day and as instructed  -     ONETOUCH DELICA LANCETS FINE MISC; Use 4 a day and as directed  Gestational diabetes mellitus (GDM) in second trimester, gestational diabetes method of control unspecified  -     Hemoglobin A1C; Standing  -     TSH, 3rd generation with Free T4 reflex; Future  -     ONETOUCH VERIO test strip; Test 4 times a day and as instructed  -     ONETOUCH DELICA LANCETS FINE MISC; Use 4 a day and as directed  16 weeks gestation of pregnancy  -     Hemoglobin A1C; Standing  -     TSH, 3rd generation with Free T4 reflex; Future  -     ONETOUCH VERIO test strip; Test 4 times a day and as instructed  -     ONETOUCH DELICA LANCETS FINE MISC; Use 4 a day and as directed  Elevated fasting glucose  -     Ambulatory referral to Diabetic Education  -     Hemoglobin A1C; Standing  -     TSH, 3rd generation with Free T4 reflex; Future  -     ONETOUCH VERIO test strip; Test 4 times a day and as instructed  -     ONETOUCH DELICA LANCETS FINE MISC; Use 4 a day and as directed  1  Start self monitoring blood glucose fasting and 2 hours after start of each meal  Keep glucose log  Glucose goals: fasting 60-90 mg/dL, 135 mg/dL or less 1 hour post meals, and 120 mg/dL or less 2 hours post meal    2  Report glucose readings weekly via [employer]  Report next Monday, 10/21/19 or sooner if needed  3  Start GDM diet with 3 meals and 3 snacks including recommended combination of carb, protein and fat per meal/snack  4  Please eat meal or snack every 2-3 5 hours during the day  5  No more than 8 to 10 hours of fasting overnight  6  Stay active if no restriction from your OB, walk up to 30 minutes a day  7  Always have glucose available to treat hypoglycemia  Use 15:15 rule  Refer to hypoglycemia patient education sheet  Test blood sugar when experiencing signs and symptoms of hypoglycemia and prior to driving  8  Continue prenatal vitamin and baby aspirin as recommended  9  Continue follow-up with your OB and MFM as recommended  10  Check A1c and TSH with reflex free T4  A1c goal 5 6% or less  11  Follow up in: 3 months or sooner if needed  Insulin requirements during pregnancy discussed  Basal/bolus insulin concept reviewed  Metformin discussed and informed it does cross placenta  Importance of tight glucose control during pregnancy and risk factors as outlined in diabetes and pregnancy booklet discussed  Subjective:      Patient ID: Maynard Gaucher is a 51 y o  female  , JOSE 3/25/20, IVF date 19  Referred for diabetes and pregnancy management  History of infertility and two miscarriages at 8 weeks and 2 weeks  Followed by reproductive endocrinologist        The following portions of the patient's history were reviewed and updated as appropriate: allergies, current medications, past family history, past medical history, past social history, past surgical history and problem list     No Known Allergies  Current Outpatient Medications on File Prior to Visit   Medication Sig Dispense Refill    aspirin (ECOTRIN LOW STRENGTH) 81 mg EC tablet Take 2 tablets (162 mg total) by mouth daily 180 tablet 3    Prenatal Vit-Fe Fumarate-FA (PRENATAL VITAMIN) 28-0 8 mg Take by mouth       No current facility-administered medications on file prior to visit  Review of Systems   Constitutional: Positive for fatigue  Negative for fever  HENT: Positive for congestion  Negative for trouble swallowing  Eyes: Negative for visual disturbance  Respiratory: Negative for cough and shortness of breath  Cardiovascular: Negative  Gastrointestinal: Negative for constipation, nausea and vomiting  Endocrine: Positive for polydipsia and polyuria   Negative for cold intolerance, heat intolerance and polyphagia  Genitourinary: Negative for difficulty urinating and vaginal bleeding  Musculoskeletal: Negative for arthralgias and joint swelling  Skin: Negative for rash  Allergic/Immunologic: Positive for environmental allergies  Negative for food allergies  Neurological: Negative for dizziness, light-headedness and headaches  Psychiatric/Behavioral: Negative for sleep disturbance  Objective:  9/9/19 1 hour , 10/7/19 3 hour GTT 95, 135, 152, 111  FBS 97 on CMP  Component Value Date/Time    HGBA1C 4 4 06/29/2014 0938      /69 (BP Location: Right arm, Patient Position: Sitting, Cuff Size: Standard)   Pulse 80   Ht 5' 2" (1 575 m)   Wt 60 2 kg (132 lb 12 8 oz)   LMP 05/10/2019 (Exact Date)   BMI 24 29 kg/m²      Physical Exam   Constitutional: She appears well-developed and well-nourished  She is cooperative  HENT:   Head: Normocephalic  Eyes: Conjunctivae and lids are normal    Neck: Normal range of motion  Cardiovascular: Normal rate, regular rhythm, S1 normal and S2 normal    Pulmonary/Chest: Effort normal and breath sounds normal    Musculoskeletal: Normal range of motion  Neurological: She is alert  Skin: Skin is warm and dry  Psychiatric: She has a normal mood and affect   Her behavior is normal  Judgment and thought content normal          Time in:1:05 PM  Time out:2:10 PM

## 2019-10-15 NOTE — PROGRESS NOTES
La Nena Lovett is here today for routine PN  Overall feeling well  Failed her 3 hour GTT with her fasting blood sugar, discussed this result with La Nena Lovett and her partner - referral to [facility] given  Discussed risks of untreated GDM  Has follow up at [facility] scheduled  No further bleeding/spotting

## 2019-10-22 ENCOUNTER — DOCUMENTATION (OUTPATIENT)
Dept: PERINATAL CARE | Facility: CLINIC | Age: 49
End: 2019-10-22

## 2019-10-22 ENCOUNTER — TELEPHONE (OUTPATIENT)
Dept: PERINATAL CARE | Facility: CLINIC | Age: 49
End: 2019-10-22

## 2019-10-22 NOTE — PROGRESS NOTES
Date:  10/22/19  RE: Paul Mclean    : 1970  JOSE: Estimated Date of Delivery: 3/25/20  EGA: 17w6d  A1GDNINA, AMA          Addendum: note not completed on 10/22/19  Late entry- Patient had insulin pen education scheduled for 10/23/19 and to start on Levemir 16 units at bedtime         Valentina Lopez, SCAR, CDE  Diabetes Educator   Diabetes and Pregnancy Program

## 2019-10-22 NOTE — TELEPHONE ENCOUNTER
Stated she is varying the times she is taking her FBS from 6 AM to 9 AM in the morning depending if she is working or on a day off  Stated she is up by 6 AM & often does not take her FBS till 9 AM after getting her children off to school  Advised her to always take her FBS at the same time daily  Suggested she take it every day at 6 AM & then eat breakfast later at 9 AM after children are off to school  Stated she is willing to begin insulin to get her BG controlled as soon as possible  The Diabetes  will schedule her insulin education soon

## 2019-10-23 ENCOUNTER — OFFICE VISIT (OUTPATIENT)
Dept: PERINATAL CARE | Facility: CLINIC | Age: 49
End: 2019-10-23
Payer: COMMERCIAL

## 2019-10-23 ENCOUNTER — APPOINTMENT (OUTPATIENT)
Dept: LAB | Facility: HOSPITAL | Age: 49
End: 2019-10-23
Payer: COMMERCIAL

## 2019-10-23 VITALS
DIASTOLIC BLOOD PRESSURE: 74 MMHG | WEIGHT: 135.2 LBS | HEART RATE: 86 BPM | SYSTOLIC BLOOD PRESSURE: 109 MMHG | HEIGHT: 62 IN | BODY MASS INDEX: 24.88 KG/M2

## 2019-10-23 DIAGNOSIS — O24.419 GESTATIONAL DIABETES MELLITUS (GDM) IN SECOND TRIMESTER, GESTATIONAL DIABETES METHOD OF CONTROL UNSPECIFIED: ICD-10-CM

## 2019-10-23 DIAGNOSIS — O24.414 INSULIN CONTROLLED GESTATIONAL DIABETES MELLITUS (GDM) IN SECOND TRIMESTER: Primary | ICD-10-CM

## 2019-10-23 DIAGNOSIS — O99.810 ABNORMAL GLUCOSE AFFECTING PREGNANCY: ICD-10-CM

## 2019-10-23 DIAGNOSIS — Z3A.16 16 WEEKS GESTATION OF PREGNANCY: ICD-10-CM

## 2019-10-23 DIAGNOSIS — R73.01 ELEVATED FASTING GLUCOSE: ICD-10-CM

## 2019-10-23 DIAGNOSIS — Z3A.18 18 WEEKS GESTATION OF PREGNANCY: ICD-10-CM

## 2019-10-23 LAB
EST. AVERAGE GLUCOSE BLD GHB EST-MCNC: 80 MG/DL
HBA1C MFR BLD: 4.4 % (ref 4.2–6.3)
TSH SERPL DL<=0.05 MIU/L-ACNC: 2.13 UIU/ML (ref 0.36–3.74)

## 2019-10-23 PROCEDURE — G0108 DIAB MANAGE TRN  PER INDIV: HCPCS | Performed by: DIETITIAN, REGISTERED

## 2019-10-23 PROCEDURE — 84443 ASSAY THYROID STIM HORMONE: CPT

## 2019-10-23 PROCEDURE — 83036 HEMOGLOBIN GLYCOSYLATED A1C: CPT

## 2019-10-23 PROCEDURE — 36415 COLL VENOUS BLD VENIPUNCTURE: CPT

## 2019-10-23 RX ORDER — INSULIN DETEMIR 100 [IU]/ML
INJECTION, SOLUTION SUBCUTANEOUS
Qty: 15 ML | Refills: 0 | Status: SHIPPED | OUTPATIENT
Start: 2019-10-23 | End: 2020-01-09 | Stop reason: SDUPTHER

## 2019-10-23 NOTE — PATIENT INSTRUCTIONS
1  Continue Meal Plan consisting of 3 meals and 3 snacks daily, including protein at each  2  Try to eat every 2-3 5 hours while awake  3  Do not exceed 8-10 hours fasting overnight  4  Continue self-monitoring blood glucose 4 times per day: fasting and 2 hours after the start of each meal (record in log book)  5  Submit blood sugar values weekly via email: [email or web address] or My Chart  6  If no restriction from physician, recommend up to 30 minutes walking daily  7  Basaglar 16 units at bedtime (9:00-10:00 pm daily)  8  Please complete labs TSH and HgA1C  9  3 am blood sugar test morning following insulin start to monitor hypoglycemia (less 60, or 60-80 and not feeling well treat with 15:15 rule) follow up with protein snack  10  Add extra protein at lunch meal  11   Try different bedtime snack: chicken salad, tuna salad, egg salad on 5 crackers, hummus and vegetables, protein shakes glucerna hunger smart, etc   12  Please report blood sugars on Monday 10/28

## 2019-10-23 NOTE — PROGRESS NOTES
Thank you for referring your patient to [facility] Maternal Fetal Medicine Diabetes Education Program  Chanel Jean is a  51 y o  female who presents today for insulin administration instruction  Patient is at 18w0d gestation, Estimated Date of Delivery: 3/25/20  Reviewed and updated the following from patients medical record: PMH, Problem List, Allergies, and Current Medications  Diagnosis:  Medical Diagnosis/ICD 10: Insulin controlled GDM second trimester    PMH/PSH:  Past Medical History:   Diagnosis Date    Abnormal Pap smear of cervix     6 yrs ago    Female infertility     Miscarriage     2015, 2016    Recurrent pregnancy loss, antepartum condition or complication     x 2 5894,-G & C; 2016-chemical pregnancy    Thalassemia     carrier    Urinary tract infection     last episode 2019    Varicella     childhood chickenpox     Past Surgical History:   Procedure Laterality Date    DILATION AND CURETTAGE OF UTERUS  2015    WISDOM TOOTH EXTRACTION         Labs:  Lab Results   Component Value Date    HGBA1C 4 4 06/29/2014     Lab Results   Component Value Date    EAG 80 06/29/2014     Lab Results   Component Value Date    [id number] 139 (H) 09/09/2019       Medications:  Current Outpatient Medications on File Prior to Visit   Medication Sig Dispense Refill    aspirin (ECOTRIN LOW STRENGTH) 81 mg EC tablet Take 2 tablets (162 mg total) by mouth daily 180 tablet 3    ONETOUCH DELICA LANCETS FINE MISC Use 4 a day and as directed  100 each 3    ONETOUCH VERIO test strip Test 4 times a day and as instructed 100 each 3    Prenatal Vit-Fe Fumarate-FA (PRENATAL VITAMIN) 28-0 8 mg Take by mouth       No current facility-administered medications on file prior to visit        24 hr Recall:  6:30: testing fastings  B: 9:00- (was prior testing fasting at this time) breakfast sandwich english muffin, egg, cheese, sausage and milk  S: 11:00- apple with peanut butter or cottage cheese with Fruit  L: 1:00-1:30 turkey sandwich (1 slice of turkey and 1 cheese, and 2 slices of bread, with celery and carrots and peanut butter, 1/2 cup fruit water  S: 3:00 : 2 tangerines and 10 almonds or chobani yogurt or peanut butter and whole wheat crackers (3)  D: 6:00  (at least 3 oz)chicken, broccoli, small sweet potato, with milk, with wheat roll  S: 9:00-10:00: no sugar added ice cream (1/2 cup) with 6 strawberries and peanut and cool whip  Stew roast carrots and potatoes(1 cup) and milk was dinner last night    Per diet recall pt having inadequate protein at lunch meal was advised to aim for minimum of 3 oz  Advised patient to try avoiding milk with breakfast and waiting until later in the day, aiming for 30 gm CHO at breakfast verses 45 gms  Also suggested changing bedtime snack to 1 serving of CHO and 2 oz of protein, suggestions provided  Overall pt doing well at having source of protein at every meal, and spacing out times of meals and snacks appropriately  BG Log:  Refer to patient message from 10/22    Anthropometrics:  No components found for: LASTHT  Wt Readings from Last 3 Encounters:   10/14/19 60 2 kg (132 lb 12 8 oz)   10/08/19 60 3 kg (133 lb)   19 59 1 kg (130 lb 6 4 oz)       Insulin Type: Basaglar 16 units  Amount: 16 units  Administration Time(s): 9:00 pm or 10:00 pm daily    The patient was instructed on the following:   Insulin administration times, insulin action   Hypoglycemia signs, symptoms and treatment   Increase in maternal-fetal surveillance with insulin initiation   Side effects of hyperglycemia in pregnancy including macrosomia,  hypoglycemia, polyhydramnios, pre-term labor and stillbirth   Continue to monitor blood glucoses via fingerstick fasting (goal 60 mg/dl to 90 mg/dl) and two hours post prandial (goal less than 120 mg/dl)   Non-stress testing two times weekly and LESLEE testing beginning at 32 weeks gestation           Ultrasounds every 4 weeks at the [address] [place] [address] Maternal Fetal Medicine   HbA1c every 6 to 8 weeks    Labs Ordered: re-print scripts for HgA1C and TSH labs to be completed  Follow Up Date: Monday, 10/28    Begin Time: 2:00 pm  End Time: 3:00 pm  Referring Provider: Lorena Rivas    It was a pleasure working with them today  Please feel free to call with any questions or concerns      Annie Lizama RD, MARIANON  [facility]  Diabetes in Pregnancy Program  [address] [place] [address],[address]  [address]

## 2019-10-28 ENCOUNTER — DOCUMENTATION (OUTPATIENT)
Dept: PERINATAL CARE | Facility: CLINIC | Age: 49
End: 2019-10-28

## 2019-10-28 NOTE — PROGRESS NOTES
Date:  10/28/19  RE: Paul Mclean    : 1970  JOSE: 3/25/20  EGA: 18w5d  A1GDM        Glucose log submitted via [employer] unfortunately  FBG is cut off  Current regimen:  Basaglar 16 units at bedtime  1800 calorie GDM diet with 3 meals/snacks including balance ofr carbohydrate, protein and fat  Self monitoring blood glucose fasting and 2 hours after start of meals with One Touch Verio meter      Plan:  Continue current regimen    Date due to report next:  Thursday, 10-31-19    Melonie Tony, RN BS CDE  Diabetes Educator   Diabetes and Pregnancy Program

## 2019-10-30 ENCOUNTER — APPOINTMENT (OUTPATIENT)
Dept: LAB | Facility: HOSPITAL | Age: 49
End: 2019-10-30
Attending: OBSTETRICS & GYNECOLOGY
Payer: COMMERCIAL

## 2019-10-30 ENCOUNTER — TRANSCRIBE ORDERS (OUTPATIENT)
Dept: LAB | Facility: HOSPITAL | Age: 49
End: 2019-10-30

## 2019-10-30 DIAGNOSIS — Z34.90 PREGNANCY, UNSPECIFIED GESTATIONAL AGE: Primary | ICD-10-CM

## 2019-10-30 DIAGNOSIS — Z36.9 UNSPECIFIED ANTENATAL SCREENING: ICD-10-CM

## 2019-10-30 DIAGNOSIS — Z33.1 PREGNANT STATE, INCIDENTAL: ICD-10-CM

## 2019-10-30 PROCEDURE — 36415 COLL VENOUS BLD VENIPUNCTURE: CPT

## 2019-10-31 LAB — SCAN RESULT: NORMAL

## 2019-11-03 ENCOUNTER — DOCUMENTATION (OUTPATIENT)
Dept: PERINATAL CARE | Facility: CLINIC | Age: 49
End: 2019-11-03

## 2019-11-03 DIAGNOSIS — O24.414 INSULIN CONTROLLED GESTATIONAL DIABETES MELLITUS (GDM) IN SECOND TRIMESTER: ICD-10-CM

## 2019-11-03 PROBLEM — Z3A.19 19 WEEKS GESTATION OF PREGNANCY: Status: ACTIVE | Noted: 2019-09-11

## 2019-11-03 NOTE — PROGRESS NOTES
Date:  19  RE: Anselmo Em    : 1970  JOSE: 3/25/20  EGA: 19w4d  A2GDM            Glucose log submitted via [employer]  Current regimen:  Levemir or [id number] un its at 9 or 10 PM daily  With next long verify basal insulin dispensed  1800 calorie GDM diet with 3 meals/snacks including balance ofr carbohydrate, protein and fat  Eating every 2 to 3 5 hours during the day and no more than 8 to 10 hours of fasting overnight  Self monitoring blood glucose fasting and 2 hours after start of meals with One Touch Verio meter  Plan: If FBS>90 over the last few days, increase Levemir from 16 to 18 units, document update and glucose log  Continue GDM diet and SMBG  10/23/19 A1c 4 4%  Pending fetal growth ultrasound scheduled for 19  Date due to report next:  Wednesday, 19 or sooner if needed      SCAR Simon, CDE  Diabetes Educator   Diabetes and Pregnancy Program

## 2019-11-04 ENCOUNTER — TELEPHONE (OUTPATIENT)
Dept: PERINATAL CARE | Facility: CLINIC | Age: 49
End: 2019-11-04

## 2019-11-04 NOTE — TELEPHONE ENCOUNTER
----- Message from Farrukh Cummings MD sent at 11/4/2019  1:42 PM EST -----  I have reviewed the results which are low risk  Please call patient and notify her of reassuring results if she has not viewed on [facility]  Thank you

## 2019-11-04 NOTE — TELEPHONE ENCOUNTER
I called the patient on her home phone and left a voicemail, per her communication consent, with her Part 2 sequential screen results and left the nurse line phone number for her to call with any questions

## 2019-11-06 ENCOUNTER — TELEPHONE (OUTPATIENT)
Dept: PERINATAL CARE | Facility: CLINIC | Age: 49
End: 2019-11-06

## 2019-11-06 ENCOUNTER — OFFICE VISIT (OUTPATIENT)
Dept: PERINATAL CARE | Facility: CLINIC | Age: 49
End: 2019-11-06
Payer: COMMERCIAL

## 2019-11-06 ENCOUNTER — DOCUMENTATION (OUTPATIENT)
Dept: PERINATAL CARE | Facility: CLINIC | Age: 49
End: 2019-11-06

## 2019-11-06 ENCOUNTER — ROUTINE PRENATAL (OUTPATIENT)
Dept: PERINATAL CARE | Facility: CLINIC | Age: 49
End: 2019-11-06
Payer: COMMERCIAL

## 2019-11-06 VITALS
SYSTOLIC BLOOD PRESSURE: 109 MMHG | DIASTOLIC BLOOD PRESSURE: 69 MMHG | WEIGHT: 137 LBS | HEART RATE: 96 BPM | BODY MASS INDEX: 25.21 KG/M2 | HEIGHT: 62 IN

## 2019-11-06 DIAGNOSIS — O09.522 MULTIGRAVIDA OF ADVANCED MATERNAL AGE IN SECOND TRIMESTER: Primary | ICD-10-CM

## 2019-11-06 DIAGNOSIS — O09.519 HIGH-RISK PREGNANCY, PRIMIGRAVIDA OF ADVANCED MATERNAL AGE: ICD-10-CM

## 2019-11-06 DIAGNOSIS — Z3A.20 20 WEEKS GESTATION OF PREGNANCY: ICD-10-CM

## 2019-11-06 DIAGNOSIS — O24.414 INSULIN CONTROLLED GESTATIONAL DIABETES MELLITUS (GDM) IN SECOND TRIMESTER: ICD-10-CM

## 2019-11-06 DIAGNOSIS — Z36.86 ENCOUNTER FOR ANTENATAL SCREENING FOR CERVICAL LENGTH: ICD-10-CM

## 2019-11-06 DIAGNOSIS — O09.812 PREGNANCY RESULTING FROM ASSISTED REPRODUCTIVE TECHNOLOGY IN SECOND TRIMESTER: ICD-10-CM

## 2019-11-06 DIAGNOSIS — O44.42 LOW-LYING PLACENTA WITHOUT HEMORRHAGE, SECOND TRIMESTER: ICD-10-CM

## 2019-11-06 DIAGNOSIS — O09.819 PREGNANCY RESULTING FROM ASSISTED REPRODUCTIVE TECHNOLOGY, ANTEPARTUM: ICD-10-CM

## 2019-11-06 PROCEDURE — 99212 OFFICE O/P EST SF 10 MIN: CPT | Performed by: OBSTETRICS & GYNECOLOGY

## 2019-11-06 PROCEDURE — 76817 TRANSVAGINAL US OBSTETRIC: CPT | Performed by: OBSTETRICS & GYNECOLOGY

## 2019-11-06 PROCEDURE — G0108 DIAB MANAGE TRN  PER INDIV: HCPCS | Performed by: DIETITIAN, REGISTERED

## 2019-11-06 PROCEDURE — 76811 OB US DETAILED SNGL FETUS: CPT | Performed by: OBSTETRICS & GYNECOLOGY

## 2019-11-06 NOTE — LETTER
November 6, 2019     Ismael Casper [address]    Patient: Anny Lamar   YOB: 1970   Date of Visit: 11/6/2019       Dear Dr Woo Horse: Thank you for referring Anny Lamar to me for evaluation  Below are my notes for this consultation  If you have questions, please do not hesitate to call me  I look forward to following your patient along with you  Sincerely,        Kiana Gandara MD        CC: No Recipients  Kiana Gandara MD  11/6/2019 11:46 AM  Sign at close encounter  Please refer to the [facility] ultrasound report in Ob Procedures for additional information regarding the visit to the [facility]  today

## 2019-11-06 NOTE — PROGRESS NOTES
Date:  19  RE: Chanel Jean    : 1970  JOSE: 3/25/20  EGA:  20w0d  A1GDM    Date Fasting Post-  breakfast Post-  lunch Post-  dinner Before bedtime Carbs Comments   10/31/19 86 107 118 124      19 85 111 114       19 86 112 113       11/3/19 73 99 119       19 78 109 126 157   Not enough protein at dinner   19 98 118  134   Too much milk   19 94           Current regimen:  Basaglar 16 units at bedtime  1800 calorie GDM diet with 3 meals/snacks including balance ofr carbohydrate, protein and fat  Reports she is using protein drinks for snacks & as a meal supplement  Self monitoring blood glucose fasting and 2 hours after start of meals with One Touch Verio meter  Agreed to walk p dinner nightly  Plan:  Continue current regimen  Advised how to limit milk amount & add protein to her plan    Today's ultrasound: normal growth & fluids   10/23/19 [id number] 4%; reorder week of 19    Date due to report next:  Monday, 19    Gardenia Samuel BS CDE  Diabetes Educator   Diabetes and Pregnancy Program

## 2019-11-06 NOTE — TELEPHONE ENCOUNTER
Called pt about he rhaving her echo & growth scan on 12/4 same time as having her growth,1hr ok with lis

## 2019-11-06 NOTE — PROGRESS NOTES
DATE: 19   RE: Derrick Kidney   : 1970  JOSE: Estimated Date of Delivery: 3/25/20  EGA:  20w0d    Dear Drs at [address] [place] [address]:     Thank you for referring your patient to the Diabetes and Pregnancy Program at [address] [place] [address]  The patient was seen today for medical nutrition therapy for the treatment re-evaluation of gestational  diabetes during pregnancy  In addition to diabetes, the nutrition status is complicated by sedentary lifestyle  The following was reviewed with the patient:      Weight gain during in pregnancy  Based on the patients height of 5' 2" (1 575 m) inches, pre-pregnancy weight of 125 pounds (BMI 22 9), we would recommend a total weight gain of 25-35 pounds for the pregnancy  o The patients current weight is 62 1 kg (137 lb) pounds, and her weight gain to date is 12 pounds   Basic review of macronutrients   Meal pattern should consist of three small meals and three snacks daily  Patient is eating 3 meals & 3 snacks   Carbohydrate gram amounts per meal    Instructions on how to read a food label   Appropriate serving size of foods  Pateint is eating the proper amounts of foods, but is over drinking milk--drinking 20 oz glasses at a time   Incorporating protein at each meal and snack in the importance of protein in relationship to blood glucose control   Individualized meal plan: 1800 calories gestational diabetes diet  Using protein powder & protein drinks as a supplement to j carlos small meal or as a snack  Misses chocolate, advised how to add chocolate flavoring to foods   Use of food diary to maintain a meal plan   Agreed to walk after dinner daily   Post-partum diet recommendations   Report blood glucose levels to [address] weekly or as directed  o Phone: [phone]  If no response in 24 hours, call [phone]   o Fax: [phone]  o Email: [email or web address]   Follow up: As needed      Thank you for the opportunity to participate in the care of this patient  I can be reached at [phone] should you have any questions  Time spent with patient 10-10:55 AM; time spent face to face counseling greater than 50% of the appointment      Sincerely,     Adore Carr  Diabetes Educator  Diabetes and Pregnancy Program

## 2019-11-06 NOTE — PROGRESS NOTES
A transvaginal ultrasound was performed  Sonographer note on use of High Level Disinfection Process (Trophon) for transvaginal probe# 1 used, serial [id number]    [facility]

## 2019-11-06 NOTE — PROGRESS NOTES
Please refer to the [facility] ultrasound report in Ob Procedures for additional information regarding the visit to the [facility]  today

## 2019-11-12 DIAGNOSIS — O24.414 INSULIN CONTROLLED GESTATIONAL DIABETES MELLITUS (GDM) IN SECOND TRIMESTER: Primary | ICD-10-CM

## 2019-11-12 RX ORDER — INSULIN LISPRO 100 [IU]/ML
INJECTION, SOLUTION INTRAVENOUS; SUBCUTANEOUS
Qty: 15 ML | Refills: 0 | Status: SHIPPED | OUTPATIENT
Start: 2019-11-12 | End: 2020-01-09 | Stop reason: SDUPTHER

## 2019-11-13 ENCOUNTER — DOCUMENTATION (OUTPATIENT)
Dept: PERINATAL CARE | Facility: CLINIC | Age: 49
End: 2019-11-13

## 2019-11-13 NOTE — PROGRESS NOTES
Date:  19  RE: Gabriella Terrell    : 1970  JOSE: 3/25/20  EGA:  21w0d  A2GDM          Current regimen:  Levemir 18 units at 9:30 PM daily, dose increased from 16 units on 19 by Dr Erin Poole  1800 calorie GDM diet with 3 meals/snacks including balance ofr carbohydrate, protein and fat  Self monitoring blood glucose fasting and 2 hours after start of meals with One Touch Verio meter  Agreed to walk after dinner nightly previously  Plan:  Continue Levemir 18 units at 9:30 PM daily  Due to 2 hours PP>120, add Humalog 4 units before lunch and before dinner, encouraged to inject 15 minutes before meal and dose will last for 4 hours  Always have glucose available to treat hypoglycemia, use 15 by 15 rule  Encouraged to eat 3 meals and 3 snacks including combination of carbohydrates, protein and fat per meal/snack  No more than 8 to 10 hours of fasting overnight  10/23/19 [id number] 4%; reorder week of 19 AC 36%, fetal growth appeared normal     Date due to report next:  Thursday, 19 or sooner if needed       SCAR Sherman, CDE  Diabetes Educator   Diabetes and Pregnancy Program

## 2019-11-21 ENCOUNTER — DOCUMENTATION (OUTPATIENT)
Dept: PERINATAL CARE | Facility: CLINIC | Age: 49
End: 2019-11-21

## 2019-11-21 DIAGNOSIS — Z3A.22 22 WEEKS GESTATION OF PREGNANCY: ICD-10-CM

## 2019-11-21 DIAGNOSIS — O24.414 INSULIN CONTROLLED GESTATIONAL DIABETES MELLITUS (GDM) IN SECOND TRIMESTER: Primary | ICD-10-CM

## 2019-11-21 NOTE — PROGRESS NOTES
Date:  19  RE: Chon Murguia    : 1970  JOSE: 3/25/20  EGA:  22w1d  A2GDM        [facility]ia [facility]  Current regimen:  Levemir 18 units at 9:30 PM daily  Humalog 4 units before lunch and 4 units before dinner  1800 calorie GDM diet with 3 meals/snacks including balance ofr carbohydrate, protein and fat  Self monitoring blood glucose fasting and 2 hours after start of meals with One Touch Verio meter  Agreed to walk after dinner nightly previously  Plan:  Increase Levemir from 18 to 20 units at 9:30 PM daily  Due to 2 hours PP>120, increase Humalog from 4 to 5 units before lunch and before dinner, if 2 hours PP>120 for breakfast continues, consider adding Humalog 4 units before breakfast  Always have glucose available to treat hypoglycemia, use 15 by 15 rule  Encouraged to eat 3 meals and 3 snacks including combination of carbohydrates, protein and fat per meal/snack  No more than 8 to 10 hours of fasting overnight  10/23/19 MkL1h-8 4%; reorder week of 19 AC 36%, fetal growth appeared normal     Date due to report next:  Monday, 19 or sooner if needed       SCAR Taylor, CDE  Diabetes Educator   Diabetes and Pregnancy Program

## 2019-11-30 ENCOUNTER — DOCUMENTATION (OUTPATIENT)
Dept: PERINATAL CARE | Facility: CLINIC | Age: 49
End: 2019-11-30

## 2019-11-30 DIAGNOSIS — Z3A.23 23 WEEKS GESTATION OF PREGNANCY: ICD-10-CM

## 2019-11-30 DIAGNOSIS — O24.414 INSULIN CONTROLLED GESTATIONAL DIABETES MELLITUS (GDM) IN SECOND TRIMESTER: Primary | ICD-10-CM

## 2019-11-30 NOTE — PROGRESS NOTES
Date:  19  RE: Betty Montana    : 1970  JOSE: 3/25/20  EGA:  23w3d  A2GDM            Via [employer]  Current regimen:  Levemir 20 units at 9:30 PM daily  Humalog 5 units before lunch and 5 units before dinner  1800 calorie GDM diet with 3 meals/snacks including balance ofr carbohydrate, protein and fat  Eat 3 meals and 3 snacks including combination of carbohydrates, protein and fat per meal/snack  No more than 8 to 10 hours of fasting overnight  Self monitoring blood glucose fasting and 2 hours after start of meals with One Touch Verio meter  Agreed to walk after dinner nightly previously  Plan:  Continue Levemir 20 units at 9:30 PM daily  Continue Humalog 5 units before lunch and before dinner  Continue GDM diet and SMBG  Always have glucose available to treat hypoglycemia, use 15 by 15 rule  10/23/19 KnS0i-8 4%; reorder week of 19 AC 36%, fetal growth appeared normal     Date due to report next:  Tuesday, 12/3/19 or sooner if needed       SCAR Preciado, CDE  Diabetes Educator   Diabetes and Pregnancy Program

## 2019-12-04 ENCOUNTER — TELEPHONE (OUTPATIENT)
Dept: PERINATAL CARE | Facility: CLINIC | Age: 49
End: 2019-12-04

## 2019-12-04 ENCOUNTER — DOCUMENTATION (OUTPATIENT)
Dept: PERINATAL CARE | Facility: CLINIC | Age: 49
End: 2019-12-04

## 2019-12-04 ENCOUNTER — ROUTINE PRENATAL (OUTPATIENT)
Dept: PERINATAL CARE | Facility: CLINIC | Age: 49
End: 2019-12-04
Payer: COMMERCIAL

## 2019-12-04 VITALS
BODY MASS INDEX: 25.73 KG/M2 | SYSTOLIC BLOOD PRESSURE: 106 MMHG | DIASTOLIC BLOOD PRESSURE: 63 MMHG | HEART RATE: 86 BPM | HEIGHT: 62 IN | WEIGHT: 139.8 LBS

## 2019-12-04 DIAGNOSIS — O09.819 PREGNANCY RESULTING FROM ASSISTED REPRODUCTIVE TECHNOLOGY, ANTEPARTUM: ICD-10-CM

## 2019-12-04 DIAGNOSIS — Z3A.24 24 WEEKS GESTATION OF PREGNANCY: ICD-10-CM

## 2019-12-04 DIAGNOSIS — O24.414 INSULIN CONTROLLED GESTATIONAL DIABETES MELLITUS (GDM) IN SECOND TRIMESTER: Primary | ICD-10-CM

## 2019-12-04 DIAGNOSIS — O24.414 INSULIN CONTROLLED GESTATIONAL DIABETES MELLITUS (GDM) IN SECOND TRIMESTER: ICD-10-CM

## 2019-12-04 PROCEDURE — 76825 ECHO EXAM OF FETAL HEART: CPT | Performed by: OBSTETRICS & GYNECOLOGY

## 2019-12-04 PROCEDURE — 93325 DOPPLER ECHO COLOR FLOW MAPG: CPT | Performed by: OBSTETRICS & GYNECOLOGY

## 2019-12-04 PROCEDURE — 99212 OFFICE O/P EST SF 10 MIN: CPT | Performed by: OBSTETRICS & GYNECOLOGY

## 2019-12-04 PROCEDURE — 76827 ECHO EXAM OF FETAL HEART: CPT | Performed by: OBSTETRICS & GYNECOLOGY

## 2019-12-04 PROCEDURE — 76816 OB US FOLLOW-UP PER FETUS: CPT | Performed by: OBSTETRICS & GYNECOLOGY

## 2019-12-04 NOTE — PATIENT INSTRUCTIONS
Please communicate your blood sugars at least weekly with the diabetic educators at the [facility] Diabetes in Pregnancy program   The telephone number is [phone]  The e-mail address is [email or web address]  [email or web address]  If you do not hear back from the program within 48 hours of sending your blood sugars, please call SCAR Simon at [phone]  Thank you

## 2019-12-04 NOTE — PROGRESS NOTES
The patient was seen today for an ultrasound  Please see ultrasound report (located under Ob Procedures) for additional details  Thank you very much for allowing us to participate in the care of this very nice patient  Should you have any questions, please do not hesitate to contact me  Hao Rayo MD [address] [facility]  Attending Physician, Nisa

## 2019-12-04 NOTE — PROGRESS NOTES
Date:  19  RE: Criss Iglesias    : 1970  JOSE: 3/25/20  EGA:  24w0d  A2GDM      Date Fasting Post-  breakfast Post-  lunch Post-  dinner Before bedtime Comments   19 81  111 114     19 94 101 108 154     19 88 122 117 112     19 85 126 112 93     19    124     19 56* 107  113     19 88 104 132 127     12/3/19 63 114 87 118     19 89 151       *--s/s hypoglycemia    Current regimen:  Levemir 20 units at 9:30 PM daily  Reports having difficulty with insulin bubbling under skin & leaking out  Technique indicates she is injecting properly  Humalog 5 units before lunch and 5 units before dinner  1800 calorie GDM diet with 3 meals/snacks including balance ofr carbohydrate, protein and fat  Eat 3 meals and 3 snacks including combination of carbohydrates, protein and fat per meal/snack  No more than 8 to 10 hours of fasting overnight  Self monitoring blood glucose fasting and 2 hours after start of meals with One Touch Verio meter  Agreed to walk after dinner nightly previously  Plan:  Continue Levemir 20 units at 9:30 PM daily  Changed insulin pen needles to 32g x 6 mm to help prevent problem  Humalog--Take 15 minutes before eating a meal    Breakfast--add 4 units   Lunch--continue 5 units   Dinner--Increase from 5 to 6 units  Continue GDM diet and SMBG  Always have glucose available to treat hypoglycemia, use 15 by 15 rule  10/23/19 CpV4d-1 4%; reorder week of 19  Today's (19) ultrasound-- AC 52%, fetal growth & fluids appeared normal     Date due to report next:  Monday, 19 or sooner if needed       Brad Bergman, SAME  Diabetes Educator   Diabetes and Pregnancy Program

## 2019-12-17 ENCOUNTER — DOCUMENTATION (OUTPATIENT)
Dept: PERINATAL CARE | Facility: CLINIC | Age: 49
End: 2019-12-17

## 2019-12-17 DIAGNOSIS — O09.519 HIGH-RISK PREGNANCY, PRIMIGRAVIDA OF ADVANCED MATERNAL AGE: ICD-10-CM

## 2019-12-17 DIAGNOSIS — Z3A.25 25 WEEKS GESTATION OF PREGNANCY: ICD-10-CM

## 2019-12-17 DIAGNOSIS — O24.414 INSULIN CONTROLLED GESTATIONAL DIABETES MELLITUS (GDM) IN SECOND TRIMESTER: Primary | ICD-10-CM

## 2019-12-17 NOTE — PROGRESS NOTES
Date:  19  RE: Pooja Both    : 1970  JOSE: 3/25/20  EGA:  25w6d  A2GDM            Via [employer] message  Current regimen:  Levemir 20 units at 9:30 PM daily  Humalog 5 units before lunch and 6 units before dinner  ( Patient reported not adding Humalog 4 units before breakfast)   1800 calorie GDM diet with 3 meals/snacks including balance ofr carbohydrate, protein and fat  Eat 3 meals and 3 snacks including combination of carbohydrates, protein and fat per meal/snack  No more than 8 to 10 hours of fasting overnight  Self monitoring blood glucose fasting and 2 hours after start of meals with One Touch Verio meter  Agreed to walk after dinner nightly previously  Plan:  Below message sent to patient via [employer] message  "Due to 2 hours post lunch and dinner above 120, increase Humalog from 5 to 6 units before lunch and 6 to 8 units before dinner  I will not increase Levemir at this time but if your fasting glucose starts trending above 90 consistently you may need an adjustment to Levemir  I am hoping increase in Humalog for dinner will improve fasting glucose  Always have a bedtime snack  Continue GDM diet and testing  Report every Monday if possible or as recommended "   Always have glucose available to treat hypoglycemia, use 15 by 15 rule  10/23/19 BmS2i-6 4%; repeat A1c   19 ultrasound: fetal growth appeared normal and LESLEE normal     Date due to report next:  Monday, 19 or sooner if needed       Bryan Hernandez, LUCILANP, CDE  Diabetes Educator   Diabetes and Pregnancy Program

## 2019-12-20 ENCOUNTER — TELEPHONE (OUTPATIENT)
Dept: PERINATAL CARE | Facility: CLINIC | Age: 49
End: 2019-12-20

## 2019-12-20 NOTE — TELEPHONE ENCOUNTER
Date:  19  RE: Alvarez Chavira    : 1970  JOSE: 3/25/20  EGA:  Hanh Otoole  A2GDM          Addendum:  Via [employer] message  Spoke with patient regarding questions about diet and blood sugars  Patient was unable to provide additional blood sugars to follow trends to today's date  Patient reported increase Humalog dose at dinner to 10 units, continues to take 6 units at lunch and 20 units of Levemir at 9:30 PM  Verified that patient is injecting Humalog 15 minutes before meals  Current regimen:  Levemir 20 units at 9:30 PM daily  Humalog 6 units before lunch and 10 units per patient before dinner  ( Patient reported not adding Humalog 4 units before breakfast)  Verified insulin doses with patient  Patient reported  no episodes of hypoglycemia  1800 calorie GDM diet with 3 meals/snacks including balance of carbohydrate, protein and fat  Eat 3 meals and 3 snacks including combination of carbohydrates, protein and fat per meal/snack  No more than 8 to 10 hours of fasting overnight  Self monitoring blood glucose fasting and 2 hours after start of meals with One Touch Verio meter  Agreed to walk after dinner nightly previously  Plan:  Continue insulin doses as above considering patient was unable to provide blood glucose log at time of phone conversation  Continue GDM diet and testing  Recommended patient not undereat carbohydrate; pair protein with carbohydrate at all meals and snacks  Always have glucose available to treat hypoglycemia, use 15 by 15 rule  10/23/19 NgN2e-9 4%; repeat A1c   19 ultrasound: fetal growth appeared normal and LESLEE normal   Reviewed hypoglycemia signs/symptoms and treatment with patient    Date due to report next:  Monday, 19 or sooner if needed  Patient to report via [employer]   Patient is aware that insulin doses     Cayla Hartmann, RD,LDN,CDE  Diabetes Educator   Diabetes and Pregnancy Program

## 2019-12-27 ENCOUNTER — DOCUMENTATION (OUTPATIENT)
Dept: PERINATAL CARE | Facility: CLINIC | Age: 49
End: 2019-12-27

## 2019-12-27 DIAGNOSIS — O24.414 INSULIN CONTROLLED GESTATIONAL DIABETES MELLITUS (GDM) IN SECOND TRIMESTER: Primary | ICD-10-CM

## 2019-12-27 DIAGNOSIS — Z3A.27 27 WEEKS GESTATION OF PREGNANCY: ICD-10-CM

## 2019-12-27 NOTE — PROGRESS NOTES
Date:  19  RE: Ken Antoine    : 1970  JOSE: 3/25/20  EGA:  Gris Bass  A2GDM            Via [employer] message  Current regimen:  Levemir 20 units at 9:30 PM daily  Humalog 5 units before lunch and 6 units before dinner  ( Patient reported not adding Humalog 4 units before breakfast)   1800 calorie GDM diet with 3 meals/snacks including balance ofr carbohydrate, protein and fat  Eat 3 meals and 3 snacks including combination of carbohydrates, protein and fat per meal/snack  No more than 8 to 10 hours of fasting overnight  Self monitoring blood glucose fasting and 2 hours after start of meals with One Touch Verio meter  Agreed to walk after dinner nightly previously  Plan:  Below message sent to patient via [employer] message  "You are doing a good job  Continue Levemir at 22 units at 9 PM daily since it was just increased from 20  You should add Humalog 4 units before breakfast, continue 8 units before lunch and increase from 10 to 12 units before dinner  Eat 3 meals and 3 snacks as recommended  No more than 8 to 10 hours of fasting overnight  Continue testing and report glucose readings on Monday, 19 or sooner if needed  Remember it takes 3 days before you notice a decrease in glucose readings from Levemir increase  Each week insulin requirements will increase and our diabetes team will help you with adjustments  Remember picture of steep hill that you saw in the office  Take one day at a time "   Always have glucose available to treat hypoglycemia, use 15 by 15 rule  10/23/19 XcB2t-8 4%; repeat A1c   19 ultrasound: fetal growth appeared normal and LESLEE normal     Date due to report next:  Monday, 19 or sooner if needed       SCAR Luong, CDE  Diabetes Educator   Diabetes and Pregnancy Program

## 2019-12-30 PROBLEM — Z3A.27 27 WEEKS GESTATION OF PREGNANCY: Status: ACTIVE | Noted: 2019-11-21

## 2019-12-31 ENCOUNTER — DOCUMENTATION (OUTPATIENT)
Dept: PERINATAL CARE | Facility: CLINIC | Age: 49
End: 2019-12-31

## 2019-12-31 DIAGNOSIS — O24.414 INSULIN CONTROLLED GESTATIONAL DIABETES MELLITUS (GDM) IN SECOND TRIMESTER: Primary | ICD-10-CM

## 2019-12-31 DIAGNOSIS — Z3A.27 27 WEEKS GESTATION OF PREGNANCY: ICD-10-CM

## 2019-12-31 NOTE — PROGRESS NOTES
Date:  19  RE: Valerie Bennett    : 1970  JOSE: 3/25/20  EGA:  27w6d  A2GDM              Via [employer] glucose flowsheet  Current regimen:  Levemir 22 units at 9:30 PM daily  Humalog 4 units before lunch, 8 units before lunch and 12 units before dinner  1800 calorie GDM diet with 3 meals/snacks including balance ofr carbohydrate, protein and fat  Eat 3 meals and 3 snacks including combination of carbohydrates, protein and fat per meal/snack  No more than 8 to 10 hours of fasting overnight  Self monitoring blood glucose fasting and 2 hours after start of meals with One Touch Verio meter  Agreed to walk after dinner nightly previously  Plan:  Below message sent to patient via [employer] message  "Your blood sugars look great overall except fasting glucose is above 90, please increase Levemir from 22 to 24 units and continue with rest of regimen  Report glucose readings on Thursday, 20 "  Always have glucose available to treat hypoglycemia, use 15 by 15 rule  10/23/19 VkA4a-4 4%; repeat A1c   19 ultrasound: fetal growth appeared normal and LESLEE normal     Date due to report next:  Thursday, 20 or sooner if needed       SCAR Savage, CDE  Diabetes Educator   Diabetes and Pregnancy Program

## 2020-01-03 ENCOUNTER — TELEPHONE (OUTPATIENT)
Dept: OBGYN CLINIC | Facility: CLINIC | Age: 50
End: 2020-01-03

## 2020-01-03 ENCOUNTER — ULTRASOUND (OUTPATIENT)
Dept: PERINATAL CARE | Facility: CLINIC | Age: 50
End: 2020-01-03
Payer: COMMERCIAL

## 2020-01-03 VITALS
HEART RATE: 96 BPM | BODY MASS INDEX: 26.24 KG/M2 | SYSTOLIC BLOOD PRESSURE: 121 MMHG | DIASTOLIC BLOOD PRESSURE: 74 MMHG | HEIGHT: 62 IN | WEIGHT: 142.6 LBS

## 2020-01-03 DIAGNOSIS — O24.414 INSULIN CONTROLLED GESTATIONAL DIABETES MELLITUS (GDM) IN SECOND TRIMESTER: ICD-10-CM

## 2020-01-03 DIAGNOSIS — O09.519 HIGH-RISK PREGNANCY, PRIMIGRAVIDA OF ADVANCED MATERNAL AGE: ICD-10-CM

## 2020-01-03 DIAGNOSIS — O09.819 PREGNANCY RESULTING FROM ASSISTED REPRODUCTIVE TECHNOLOGY, ANTEPARTUM: ICD-10-CM

## 2020-01-03 DIAGNOSIS — O44.42 LOW-LYING PLACENTA WITHOUT HEMORRHAGE, SECOND TRIMESTER: ICD-10-CM

## 2020-01-03 DIAGNOSIS — Z3A.28 28 WEEKS GESTATION OF PREGNANCY: ICD-10-CM

## 2020-01-03 DIAGNOSIS — O99.810 ABNORMAL GLUCOSE AFFECTING PREGNANCY: ICD-10-CM

## 2020-01-03 PROCEDURE — 76816 OB US FOLLOW-UP PER FETUS: CPT | Performed by: OBSTETRICS & GYNECOLOGY

## 2020-01-03 NOTE — TELEPHONE ENCOUNTER
Dr Gonzalo Ryder, from [facility],  called office today regarding 47year old OB Pt  Dr Ramila Caruso states Pt's last prenatal visit was at 12 weeks with Dr Fortino Fishman on 10/8/19  Dr Ramila Caruso states Pt needs appointment to be seen by a doctor as soon as possible  Pt can be reached @ [phone]

## 2020-01-03 NOTE — PROGRESS NOTES
Fetal ultrasound at 28 2/7 weeks gestation  to evaluate growth  A2 GDM  As per her report has not had routine prenatal care since week 16 in this pregnancy by your office  See Ob procedures in EPIC  Ultrasound:      1  Fetal size = dates  EFW= 2lb 13 oz = 56%      2  No fetal anomalies observed  3  Normal LESLEE  I reviewed the results of this ultrasound and answered  all the questions  Recommendations:      1  Follow-up ultrasound in 4 weeks to monitor fetal growth and development and confirmation of placental location by TV scan, which was scheduled today  2  NSTs at 32 weeks  Thank you for referring your patient to our offices  I called your office an left a message in the nurse line asking for a call to be made from your office to the patient to urgently scheduled a routine prenatal care visit, review milestone fr prenatal care and discuss a plan for delivery  The limitations of ultrasound to detect all anomalies was reviewed and how it is not  a test to rule out aneuploidy  If you have any further questions do not hesitate to contact us as [phone]      Rod Au MD

## 2020-01-03 NOTE — LETTER
January 3, 2020     [address] [place] [address]    Patient: Betty Montana   YOB: 1970   Date of Visit: 1/3/2020       Dear Dr Cline Laura: Thank you for referring Betty Montana to me for evaluation  Below are my notes for this consultation  If you have questions, please do not hesitate to call me  I look forward to following your patient along with you  Sincerely,        Lyn Frances MD        CC: No Recipients  Lyn Frances MD  1/3/2020  2:20 PM  Sign at close encounter  Fetal ultrasound at 28 2/7 weeks gestation  to evaluate growth  A2 GDM  As per her report has not had routine prenatal care since week 16 in this pregnancy by your office  See Ob procedures in EPIC  Ultrasound:      1  Fetal size = dates  EFW= 2lb 13 oz = 56%      2  No fetal anomalies observed  3  Normal LESLEE  I reviewed the results of this ultrasound and answered  all the questions  Recommendations:      1  Follow-up ultrasound in 4 weeks to monitor fetal growth and development and confirmation of placental location by TV scan, which was scheduled today  2  NSTs at 32 weeks  Thank you for referring your patient to our offices  I called your office an left a message in the nurse line asking for a call to be made from your office to the patient to urgently scheduled a routine prenatal care visit, review milestone fr prenatal care and discuss a plan for delivery  The limitations of ultrasound to detect all anomalies was reviewed and how it is not  a test to rule out aneuploidy  If you have any further questions do not hesitate to contact us as [phone]      Lyn Frances MD

## 2020-01-05 ENCOUNTER — HOSPITAL ENCOUNTER (OUTPATIENT)
Facility: HOSPITAL | Age: 50
Discharge: HOME | End: 2020-01-05
Attending: OBSTETRICS & GYNECOLOGY | Admitting: OBSTETRICS & GYNECOLOGY
Payer: COMMERCIAL

## 2020-01-05 ENCOUNTER — DOCUMENTATION (OUTPATIENT)
Dept: PERINATAL CARE | Facility: CLINIC | Age: 50
End: 2020-01-05

## 2020-01-05 VITALS
WEIGHT: 142 LBS | HEIGHT: 62 IN | TEMPERATURE: 98.3 F | BODY MASS INDEX: 26.13 KG/M2 | RESPIRATION RATE: 16 BRPM | HEART RATE: 88 BPM | DIASTOLIC BLOOD PRESSURE: 69 MMHG | OXYGEN SATURATION: 98 % | SYSTOLIC BLOOD PRESSURE: 122 MMHG

## 2020-01-05 DIAGNOSIS — O24.414 INSULIN CONTROLLED GESTATIONAL DIABETES MELLITUS (GDM) IN THIRD TRIMESTER: ICD-10-CM

## 2020-01-05 DIAGNOSIS — Z3A.28 28 WEEKS GESTATION OF PREGNANCY: Primary | ICD-10-CM

## 2020-01-05 PROCEDURE — NC001 PR NO CHARGE: Performed by: OBSTETRICS & GYNECOLOGY

## 2020-01-05 PROCEDURE — 76815 OB US LIMITED FETUS(S): CPT | Performed by: OBSTETRICS & GYNECOLOGY

## 2020-01-05 PROCEDURE — 99204 OFFICE O/P NEW MOD 45 MIN: CPT

## 2020-01-06 ENCOUNTER — TELEPHONE (OUTPATIENT)
Dept: OBGYN CLINIC | Facility: CLINIC | Age: 50
End: 2020-01-06

## 2020-01-06 NOTE — PROCEDURES
Rocío Fleming, a  at 28w4d with an JOSE of 3/25/2020, by Ultrasound, was seen at [address] for the following procedure(s): $Procedure Type: LESLEE]         4 Quadrant LESLEE  LESLEE Q1 (cm): 4 8 cm  LESLEE Q2 (cm): 5 1 cm  LESLEE Q3 (cm): 1 7 cm  LESLEE Q4 (cm): 4 9 cm  LESLEE TOTAL (cm): 16 5 cm

## 2020-01-06 NOTE — PROGRESS NOTES
L&D Triage Note - OB/GYN  Margaret Whatley 47 y o  female MRN: [id number]  Unit/Bed#: LD Triage  Encounter: [id number]      Assessment:  47 y o   at 28w4d who presents for evaluation of vaginal bleeding after intercourse, with older mucoid blood in vault, Grade I posterior placenta, Rh positive, and no active bleeding noted on exam  She was also evaluated for decreased fetal movement, with reactive NST and LESLEE 16 5cm  Plan:  1  Stable for discharge home at this time  2  Recommend pelvic rest  3  Encourage patient to call [facility] provider with concerns and to continue regular OB care  Discussed that patient should especially call for vaginal bleeding, leakage of fluid, contractions, or decreased fetal movement       ______________________________________________________________________      Chief Compliant: vaginal bleeding    Subjective:  47 y o  Sunita Waters at 28w4d who presents for evaluation of vaginal bleeding  She reports that she had intercourse with her  this morning  She was then in her usual state of health until about 8pm  She took her insulin (A2GDM), and went to the bathroom, when she noted a gush of brownish red blood  She did not see any clots, any bright red blood  She has been having occasional cramping pains in her RUQ, and prior to the gush of blood, she noted some worsened cramping pain on her right side  She has had a little bit of continued brownish discharge since that time, and has been wearing a pad, but it has not been as significant as the first time  She also noted decreased fetal movement after the bleeding, but the fetal movement has improved since being in triage  She denies contractions or leakage of fluid  This is an IVF pregnancy  Her placenta was previously low lying, which had resolved as of last ultrasound 1/3/20  She has not been seen at [facility] office since 10/8/2019, as she believed her  center appointments were her prenatal visits   She has now made an appointment to be seen at the [facility] office 1/15/20  She did not call her OB as she was so concerned about the bleeding she rushed to the car      Objective:  Vitals:    01/05/20 2050   BP: 122/69   Pulse: 88   Resp: 16   Temp: 98 3 °F (36 8 °C)   SpO2: 98%     Abdomen soft, nontender, nondistended    SSE: light brown blood in vault, brownish-red tinged mucus protruding from os, os appears closed, no lesions or lacerations on the vaginal wall  FHT:  140 / Moderate 6-25bpm / 10x10 accelerations present, no decelerations  [place]: none    TAUS  LESLEE: 16 55  Vertex   Gross fetal movement appreciated  Posterior placenta      Discussed with Dr Justin Henson MD  1/5/2020 9:36 PM

## 2020-01-06 NOTE — TELEPHONE ENCOUNTER
Pt is IVF pregnancy, MAXIME, has not been following with us since 10/8 as she believed her prenatal care was a  center-  [place] with bleeding over the weekend and needs an appt sooner than the one scheduled on 1/15/20  Please reach out to pt to schedule her with a physician this week to follow up on her vaginal bleeding

## 2020-01-06 NOTE — DISCHARGE INSTRUCTIONS
Pelvic Rest   WHAT YOU NEED TO KNOW:   Pelvic rest is when you do not put anything in your vagina  This includes tampons, douching, or sexual activity  You may need to avoid lifting heavy objects  Your healthcare provider will tell you how long you need pelvic rest   DISCHARGE INSTRUCTIONS:   Return to the emergency department if:   · You have sudden, heavy bleeding from your vagina  · You have severe pain  Contact your healthcare provider if:   · You have discharge from your vagina that smells bad  · You have a fever  · You have questions or concerns about your condition or care  Follow up with your healthcare provider as directed:  Write down your questions so you remember to ask them during your visits  © 2017 [facility] [facility] Information is for End User's use only and may not be sold, redistributed or otherwise used for commercial purposes  All illustrations and images included in CareNotes® are the copyrighted property of [employer] , [employer]  or Prem Burgos  The above information is an  only  It is not intended as medical advice for individual conditions or treatments  Talk to your doctor, nurse or pharmacist before following any medical regimen to see if it is safe and effective for you

## 2020-01-07 ENCOUNTER — ROUTINE PRENATAL (OUTPATIENT)
Dept: OBGYN CLINIC | Facility: CLINIC | Age: 50
End: 2020-01-07
Payer: COMMERCIAL

## 2020-01-07 VITALS — BODY MASS INDEX: 26.01 KG/M2 | SYSTOLIC BLOOD PRESSURE: 100 MMHG | WEIGHT: 142.2 LBS | DIASTOLIC BLOOD PRESSURE: 68 MMHG

## 2020-01-07 DIAGNOSIS — O44.42 LOW-LYING PLACENTA WITHOUT HEMORRHAGE, SECOND TRIMESTER: ICD-10-CM

## 2020-01-07 DIAGNOSIS — O09.93 PREGNANCY, SUPERVISION, HIGH-RISK, THIRD TRIMESTER: Primary | ICD-10-CM

## 2020-01-07 DIAGNOSIS — O09.819 PREGNANCY RESULTING FROM ASSISTED REPRODUCTIVE TECHNOLOGY, ANTEPARTUM: ICD-10-CM

## 2020-01-07 DIAGNOSIS — M54.9 MUSCULOSKELETAL BACK PAIN: ICD-10-CM

## 2020-01-07 DIAGNOSIS — K59.00 CONSTIPATION, UNSPECIFIED CONSTIPATION TYPE: ICD-10-CM

## 2020-01-07 DIAGNOSIS — Z23 NEED FOR TDAP VACCINATION: ICD-10-CM

## 2020-01-07 DIAGNOSIS — O24.414 INSULIN CONTROLLED GESTATIONAL DIABETES MELLITUS (GDM) IN THIRD TRIMESTER: ICD-10-CM

## 2020-01-07 LAB
SL AMB  POCT GLUCOSE, UA: ABNORMAL
SL AMB POCT URINE PROTEIN: ABNORMAL

## 2020-01-07 PROCEDURE — PNV: Performed by: STUDENT IN AN ORGANIZED HEALTH CARE EDUCATION/TRAINING PROGRAM

## 2020-01-07 PROCEDURE — 90471 IMMUNIZATION ADMIN: CPT

## 2020-01-07 PROCEDURE — 90715 TDAP VACCINE 7 YRS/> IM: CPT

## 2020-01-07 NOTE — PROGRESS NOTES
28w6d  Has not been seen since Oct  Rh pos    JOSE: 3/25/2020  IVF preg  Gave 28 wk lab slips today  Gave breast pump form  Tdap given today and CDC vaccine sheet given  Has discomfort that's constant in RUQ  Has spotting  No LOF or contractions  GFM  Has constipation

## 2020-01-07 NOTE — PROGRESS NOTES
48 yo  at 28 6/7 weeks here for routine PNV  Doing well today, reports spotting has significantly decreased since her ED visit in triage  Does complain of some constipation, right rib pain with positioning  Otherwise doing well  + GM, no LOF/contractions  Continues to track [address] with Diabetic Pathway  Diagnoses and all orders for this visit:    Pregnancy, supervision, high-risk, third trimester  -     POCT urine dip  -     CBC and differential; Future  -     RPR;  Future   - 28 week labs given today - reviewed timing of future appointments with us here at [address] [place] [address]  labor precautions and fetal kick counts   - recommend all members of family to get tdap vaccine  Pregnancy resulting from assisted reproductive technology, antepartum   - Donor egg   - continue follow up with MFM as scheduled  Need for Tdap vaccination  -     TDAP VACCINE GREATER THAN OR EQUAL TO 8YO IM   - given today  Low-lying placenta without hemorrhage, second trimester   - Per last ultrasound 1/3/2020, resolved - will plan for repeat in 4 weeks to reassess   -  Peter IUP   28 weeks and 5 days by this ultrasound  (JOSE=MAR 22 2020)   28 weeks and 2 days by IV Fertilization  (JOSE=MAR 25 2020)   Breech presentation   Fetal growth appeared normal   Regular fetal heart rate of 153 bpm   Posterior placenta   No placenta previa    Insulin controlled gestational diabetes mellitus (GDM) in third trimester   - continue with Diabetic Pathway  Constipation, unspecified constipation type   - reviewed use of colace safe in pregnancy  Musculoskeletal back pain   - feels better with massage   - for heat/ice, tylenol, massage, continue to monitor

## 2020-01-09 DIAGNOSIS — O99.810 ABNORMAL GLUCOSE AFFECTING PREGNANCY: ICD-10-CM

## 2020-01-09 DIAGNOSIS — Z3A.29 29 WEEKS GESTATION OF PREGNANCY: ICD-10-CM

## 2020-01-09 DIAGNOSIS — R73.01 ELEVATED FASTING GLUCOSE: ICD-10-CM

## 2020-01-09 DIAGNOSIS — Z3A.16 16 WEEKS GESTATION OF PREGNANCY: ICD-10-CM

## 2020-01-09 DIAGNOSIS — O24.414 INSULIN CONTROLLED GESTATIONAL DIABETES MELLITUS (GDM) IN THIRD TRIMESTER: Primary | ICD-10-CM

## 2020-01-09 DIAGNOSIS — O24.414 INSULIN CONTROLLED GESTATIONAL DIABETES MELLITUS (GDM) IN SECOND TRIMESTER: ICD-10-CM

## 2020-01-09 DIAGNOSIS — O24.419 GESTATIONAL DIABETES MELLITUS (GDM) IN SECOND TRIMESTER, GESTATIONAL DIABETES METHOD OF CONTROL UNSPECIFIED: ICD-10-CM

## 2020-01-09 RX ORDER — INSULIN LISPRO 100 [IU]/ML
INJECTION, SOLUTION INTRAVENOUS; SUBCUTANEOUS
Qty: 15 ML | Refills: 0 | Status: SHIPPED | OUTPATIENT
Start: 2020-01-09 | End: 2020-02-10

## 2020-01-09 RX ORDER — LANCETS 33 GAUGE
EACH MISCELLANEOUS
Qty: 100 EACH | Refills: 3 | Status: SHIPPED | OUTPATIENT
Start: 2020-01-09 | End: 2020-03-24 | Stop reason: ALTCHOICE

## 2020-01-09 RX ORDER — INSULIN DETEMIR 100 [IU]/ML
INJECTION, SOLUTION SUBCUTANEOUS
Qty: 15 ML | Refills: 0 | Status: SHIPPED | OUTPATIENT
Start: 2020-01-09 | End: 2020-02-24 | Stop reason: SDUPTHER

## 2020-01-09 RX ORDER — BLOOD SUGAR DIAGNOSTIC
STRIP MISCELLANEOUS
Qty: 100 EACH | Refills: 3 | Status: SHIPPED | OUTPATIENT
Start: 2020-01-09 | End: 2020-03-21 | Stop reason: HOSPADM

## 2020-01-13 ENCOUNTER — ROUTINE PRENATAL (OUTPATIENT)
Dept: PERINATAL CARE | Facility: CLINIC | Age: 50
End: 2020-01-13
Payer: COMMERCIAL

## 2020-01-13 VITALS
HEART RATE: 87 BPM | DIASTOLIC BLOOD PRESSURE: 67 MMHG | WEIGHT: 143.6 LBS | BODY MASS INDEX: 26.43 KG/M2 | HEIGHT: 62 IN | SYSTOLIC BLOOD PRESSURE: 105 MMHG

## 2020-01-13 DIAGNOSIS — O24.414 INSULIN CONTROLLED GESTATIONAL DIABETES MELLITUS (GDM) IN THIRD TRIMESTER: Primary | ICD-10-CM

## 2020-01-13 DIAGNOSIS — O09.519 HIGH-RISK PREGNANCY, PRIMIGRAVIDA OF ADVANCED MATERNAL AGE: ICD-10-CM

## 2020-01-13 PROCEDURE — 59025 FETAL NON-STRESS TEST: CPT | Performed by: OBSTETRICS & GYNECOLOGY

## 2020-01-13 NOTE — PROGRESS NOTES
[address]: Ms Elaine Thomas was seen today at 29w5d for NST (found under the pregnancy episode) which I reviewed the RN assessment and agree  Please don't hesitate to contact our office with any concerns or questions    Carley Cotter MD

## 2020-01-13 NOTE — LETTER
NST sleeve cover sheet    Patient name: Valerie Bennett  : 1970  MRN: [id number]    JOSE: Estimated Date of Delivery: 3/25/20    Obstetrician: _________SLOGA____________    Reason(s) for testing:  _______________A2GDM, AMA_________________      Testing frequency:    __X_ 2x/wk  ___ 1x/wk  ___ Dopplers  ___ BPP?       Last growth scan: __________________________________________
Declines

## 2020-01-13 NOTE — PATIENT INSTRUCTIONS

## 2020-01-15 ENCOUNTER — ROUTINE PRENATAL (OUTPATIENT)
Dept: OBGYN CLINIC | Facility: CLINIC | Age: 50
End: 2020-01-15

## 2020-01-15 VITALS — BODY MASS INDEX: 26.16 KG/M2 | DIASTOLIC BLOOD PRESSURE: 68 MMHG | SYSTOLIC BLOOD PRESSURE: 108 MMHG | WEIGHT: 143 LBS

## 2020-01-15 DIAGNOSIS — O09.519 HIGH-RISK PREGNANCY, PRIMIGRAVIDA OF ADVANCED MATERNAL AGE: Primary | ICD-10-CM

## 2020-01-15 DIAGNOSIS — O24.414 INSULIN CONTROLLED GESTATIONAL DIABETES MELLITUS (GDM) IN THIRD TRIMESTER: ICD-10-CM

## 2020-01-15 DIAGNOSIS — O44.42 LOW-LYING PLACENTA WITHOUT HEMORRHAGE, SECOND TRIMESTER: ICD-10-CM

## 2020-01-15 LAB
SL AMB  POCT GLUCOSE, UA: NEGATIVE
SL AMB POCT URINE PROTEIN: NEGATIVE

## 2020-01-15 PROCEDURE — PNV: Performed by: STUDENT IN AN ORGANIZED HEALTH CARE EDUCATION/TRAINING PROGRAM

## 2020-01-15 NOTE — ASSESSMENT & PLAN NOTE
-precautions reviewed  -s/p Tdap/flu  -taking ASA, PEC labs wnl at baseline  -needs 28 week labs   Discussed  -reflux: recommend pepcid daily --> BID prn  -BMI 26, recommended exercise regularly and discussed importance of exercise and curbing weight gain in improving chance of vaginal delivery

## 2020-01-15 NOTE — PROGRESS NOTES
51yo  at 30wk0d, here for return OB visit  Feeling well overall and without concerns  Good FM  Denies LOF, contractions  Has been having vaginal bleeding for over a week  Started after intercourse and was seen in ED, where she was told that the bleeding was secondary to cervical friability  Bleeding has continued daily  Spotting, no more  No further intercourse since  No problems with activity  Active, but not "exercising "       Problem List Items Addressed This Visit        Endocrine    Insulin controlled gestational diabetes mellitus (GDM) in third trimester     Sees diabetic pathways, following diabetic diet  Lab Results   Component Value Date    HGBA1C 4 4 10/23/2019               Other    High-risk pregnancy, primigravida of advanced maternal age - Primary     -precautions reviewed  -s/p Tdap/flu  -needs 28 week labs  Discussed  -reflux: recommend pepcid daily --> BID prn  -BMI 26, recommended exercise regularly and discussed importance of exercise and curbing weight gain in improving chance of vaginal delivery           Relevant Orders    POCT urine dip (Completed)    Low-lying placenta without hemorrhage, second trimester     Not seen on last ultrasound, however she has been having bleeding for >1 week   Blood seen from cervical os, not vaginal/cervical laceration  -reassured that unlikely dangerous given lack of abdominal pain, but recommend short interval follow up with MFM

## 2020-01-15 NOTE — ASSESSMENT & PLAN NOTE
Not seen on last ultrasound, however she has been having bleeding for >1 week   Blood seen from cervical os, not vaginal/cervical laceration  -reassured that unlikely dangerous given lack of abdominal pain, but recommend short interval follow up with MFM

## 2020-01-15 NOTE — PROGRESS NOTES
Good fetal movement  Patient has not completed 28 week lab bloodwork yet; patient is being seen by [facility] for glucose monitoring  Patient reports having brown spotting since 1/5 - was seen in the ER and had an ultrasound done  Patient also reports having acid reflux in addition to a burning sensation in belly; patient took TUMS to relieve symptoms  No other concerns at this time

## 2020-01-15 NOTE — ASSESSMENT & PLAN NOTE
[facility]es [facility], following diabetic diet  Lab Results   Component Value Date    HGBA1C 4 4 10/23/2019

## 2020-01-16 ENCOUNTER — TELEPHONE (OUTPATIENT)
Dept: PERINATAL CARE | Facility: CLINIC | Age: 50
End: 2020-01-16

## 2020-01-16 ENCOUNTER — TELEPHONE (OUTPATIENT)
Dept: OBGYN CLINIC | Facility: CLINIC | Age: 50
End: 2020-01-16

## 2020-01-16 DIAGNOSIS — O09.519 HIGH-RISK PREGNANCY, PRIMIGRAVIDA OF ADVANCED MATERNAL AGE: Primary | ICD-10-CM

## 2020-01-16 NOTE — TELEPHONE ENCOUNTER
Patient contacted M to set up an U/S appointment  According to Patient her OB requested that she be seen for a follow up U/S for bleeding  Patient also said that OB removed a blood clot  Patient's OB was contacted by [facility] to send a referral regarding the appointment

## 2020-01-16 NOTE — TELEPHONE ENCOUNTER
Patient contacted M to set up an U/S appointment  According to Patient her OB requested that she be seen for a follow up U/S for bleeding  Patient also said that OB removed a blood clot  Patient's OB was contacted by [facility] to send a referral regarding the appointment

## 2020-01-16 NOTE — TELEPHONE ENCOUNTER
Maritza with MFM called requesting a referral for patient  She said patient went in to get an ultrasound because a blood clot was found   Patient is an OB patient

## 2020-01-17 ENCOUNTER — ULTRASOUND (OUTPATIENT)
Dept: PERINATAL CARE | Facility: CLINIC | Age: 50
End: 2020-01-17
Payer: COMMERCIAL

## 2020-01-17 VITALS
DIASTOLIC BLOOD PRESSURE: 70 MMHG | BODY MASS INDEX: 26.31 KG/M2 | WEIGHT: 143 LBS | HEART RATE: 87 BPM | HEIGHT: 62 IN | SYSTOLIC BLOOD PRESSURE: 111 MMHG

## 2020-01-17 DIAGNOSIS — Z3A.28 28 WEEKS GESTATION OF PREGNANCY: ICD-10-CM

## 2020-01-17 DIAGNOSIS — O44.42 LOW-LYING PLACENTA WITHOUT HEMORRHAGE, SECOND TRIMESTER: ICD-10-CM

## 2020-01-17 DIAGNOSIS — O24.414 INSULIN CONTROLLED GESTATIONAL DIABETES MELLITUS (GDM) IN THIRD TRIMESTER: ICD-10-CM

## 2020-01-17 DIAGNOSIS — Z3A.30 30 WEEKS GESTATION OF PREGNANCY: Primary | ICD-10-CM

## 2020-01-17 DIAGNOSIS — N93.9 VAGINAL BLEEDING: ICD-10-CM

## 2020-01-17 DIAGNOSIS — O09.819 PREGNANCY RESULTING FROM ASSISTED REPRODUCTIVE TECHNOLOGY, ANTEPARTUM: ICD-10-CM

## 2020-01-17 PROBLEM — N84.1 ENDOCERVICAL POLYP: Status: ACTIVE | Noted: 2020-01-17

## 2020-01-17 PROCEDURE — 76817 TRANSVAGINAL US OBSTETRIC: CPT | Performed by: OBSTETRICS & GYNECOLOGY

## 2020-01-17 PROCEDURE — 76815 OB US LIMITED FETUS(S): CPT | Performed by: OBSTETRICS & GYNECOLOGY

## 2020-01-17 PROCEDURE — 99212 OFFICE O/P EST SF 10 MIN: CPT | Performed by: OBSTETRICS & GYNECOLOGY

## 2020-01-17 NOTE — PROGRESS NOTES
A transvaginal ultrasound was performed  Sonographer note on use of High Level Disinfection Process (Trophon) for transvaginal probe# 4 used, serial # [id number]    Gurjit Pretty RDMS

## 2020-01-17 NOTE — PROGRESS NOTES
[address] [facility]: Ms Kirill Chandra was seen today at 30w2d for transvaginal and abdominal ultrasound to evaluate for vaginal bleeding     See ultrasound report under "OB Procedures" tab    Please don't hesitate to contact our office with any concerns or questions   -Huber Landin MD

## 2020-01-20 ENCOUNTER — DOCUMENTATION (OUTPATIENT)
Dept: PERINATAL CARE | Facility: CLINIC | Age: 50
End: 2020-01-20

## 2020-01-20 DIAGNOSIS — O09.519 HIGH-RISK PREGNANCY, PRIMIGRAVIDA OF ADVANCED MATERNAL AGE: ICD-10-CM

## 2020-01-20 DIAGNOSIS — O24.414 INSULIN CONTROLLED GESTATIONAL DIABETES MELLITUS (GDM) IN THIRD TRIMESTER: Primary | ICD-10-CM

## 2020-01-20 DIAGNOSIS — Z3A.30 30 WEEKS GESTATION OF PREGNANCY: ICD-10-CM

## 2020-01-20 PROBLEM — Z34.90 PREGNANCY: Status: ACTIVE | Noted: 2019-11-21

## 2020-01-20 NOTE — PROGRESS NOTES
Date:  20  RE: Wojciech Toribio    : 1970  JOSE: 3/25/20  EGA:  30w5d  A2GDM            Via [employer] glucose flowsheet  Current regimen:  Levemir 28 units at 9:30 PM daily  Humalog 4 units before lunch, 8 units before lunch and 12 units before dinner  1800 calorie GDM diet with 3 meals/snacks including balance ofr carbohydrate, protein and fat  Eat 3 meals and 3 snacks including combination of carbohydrates, protein and fat per meal/snack  No more than 8 to 10 hours of fasting overnight  Self monitoring blood glucose fasting and 2 hours after start of meals with One Touch Verio meter  Agreed to walk after dinner nightly previously  Plan:  Continue Levemir 28 units at 9:30 PM daily  Due to 2 hours PP>120, increase Humalog from 4-8-12-0 to 5-10-14-0 before meals 1-2-3  Always have glucose available to treat hypoglycemia, use 15 by 15 rule  10/23/19 KmB2i-7 4%; repeat A1c   1/3/20 ultrasound: fetal growth appeared normal and LESLEE normal     Date due to report next:  Monday, 20 or sooner if needed       SCAR Kent, CDE  Diabetes Educator   Diabetes and Pregnancy Program

## 2020-01-23 ENCOUNTER — TELEPHONE (OUTPATIENT)
Dept: PERINATAL CARE | Facility: CLINIC | Age: 50
End: 2020-01-23

## 2020-01-23 NOTE — TELEPHONE ENCOUNTER
Pt called and left message on [facility] nurse line 1/22 @ [phone] stating that she is still having some brown vaginal spotting and discharge and was wondering if this was still normal and related to the cervical polyp noted on ultrasound last week  She is requesting a call back   Message routed to Dr Mikki Wilcox as she saw the pt at her ultrasound from 1/17/20

## 2020-01-23 NOTE — TELEPHONE ENCOUNTER
Returned pt call, reassured that brown spotting is expected, but an increase in volume or bright red color should be evaluated by her OB    Jaki Acevedo MD

## 2020-01-28 PROBLEM — O44.43 LOW-LYING PLACENTA IN THIRD TRIMESTER: Status: RESOLVED | Noted: 2019-11-06 | Resolved: 2020-01-28

## 2020-01-28 PROBLEM — O44.43 LOW-LYING PLACENTA IN THIRD TRIMESTER: Status: ACTIVE | Noted: 2019-11-06

## 2020-01-28 NOTE — PATIENT INSTRUCTIONS
Thank you for choosing us for your  care today  If you have any questions about your ultrasound or care, please do not hesitate to contact us or your primary obstetrician  Some general instructions for your pregnancy are:     Exercise: we encourage most pregnant women to get regular physical activity in pregnancy  Exercise has been shown to reduce the risk of several pregnancy-related complications  Unless instructed otherwise, you can aim for 22 minutes per day (150 minutes per week! )   Nutrition: aim for calcium-rich and iron-rich foods as well as healthy sources of protein   Weight: ask your doctor what is the appropriate amount of weight for you to gain in pregnancy  We have nutritionists here if you would like to meet with them   Protect against the flu: get yourself and your entire household vaccinated against influenza  Tell your partner to get vaccinated as well  Good hand hygiene can reduce the spread of this potentially deadly virus  Insist that everyone who is going to hold or be around your baby get vaccinated   Learn about Preeclampsia: preeclampsia is a common, serious complication in pregnancy  A blood pressure of 140mmHg (top number or systolic) OR 30QRQX (bottom number or diastolic) is elevated and needs evaluation by your doctor  Ask your doctor early in pregnancy if you should take aspirin (not motrin or tylenol) to prevent preeclampsia  If you were advised to take aspirin to prevent preeclampsia, a daily dose of 162mg or 81mg is advised  One resource to learn more is www  preeclampsia org    If you smoke, try to reduce how many cigarettes you smoke or quit completely  Do not vape   Other warning signs to watch out for in pregnancy or postpartum: chest pain, obstructed breathing or shortness of breath, seizures, thoughts of hurting yourself or your baby, bleeding, a painful or swollen leg, fever, or headache ([facility] POST-BIRTH Warning Signs campaign)    If these happen call 911  Itching is also not normal in pregnancy and if you experience this, especially over your hands and feet, potentially worse at night, notify your doctors  Kick Counts in Pregnancy   AMBULATORY CARE:   Kick counts  measure how much your baby is moving in your womb  A kick from your baby can be felt as a twist, turn, swish, roll, or jab  It is common to feel your baby kicking at 26 to 28 weeks of pregnancy  You may feel your baby kick as early as 20 weeks of pregnancy  Seek care immediately if:   · You feel your baby kick less as the day goes on      · You do not feel any kicks in a day  Contact your healthcare provider if:   · You feel a change in the number of kicks or movements of your baby  · You feel fewer than 10 kicks within 2 hours  · You have questions or concerns about your baby's movements  Why measure kick counts:  Your baby's movement may provide information about your baby's health  He may move less, or not at all, if there are problems  He may move less if he does not have enough room to grow in your uterus (womb)  He may also move less if he is not getting enough oxygen or nutrition from the placenta  Tell your healthcare provider as soon as you feel a change in your baby's movements  Problems that are found earlier are easier to treat  When to measure kick counts:   · Measure kick counts at the same time every day  · Measure kick counts when your baby is awake and most active  Your baby may be most active in the evening  · Measure kick counts after a meal or snack  Your baby may be more active after you eat  Wait 2 hours after you drink liquids that contain caffeine  Caffeine can make your baby more active than usual     · You should not smoke while you are pregnant  Smoking increases the risk of health problems for you and for your baby during your pregnancy  If you do smoke, wait 2 hours to measure kick counts   Nicotine can make your baby more active than usual   How to measure kick counts:  Check that your baby is awake before you measure kick counts  You can wake up your baby by lightly pushing on your belly, walking, or drinking something cold  Your healthcare provider may tell you different ways to measure kick counts  He may tell you to do the following:  · Use a chart or clock to keep track of the time you start and finish counting  · Sit in a chair or lie on your left side  · Place your hands on the largest part of your belly  · Count until you reach 10 kicks  Write down how much time it takes to count 10 kicks  · It may take 30 minutes to 2 hours to count 10 kicks  It should not take more than 2 hours to count 10 kicks  · Your baby can sleep for up to 40 minutes at one time  Follow up with your healthcare provider as directed:  Write down your questions so you remember to ask them during your visits  © 2017 [employer]  Information is for End User's use only and may not be sold, redistributed or otherwise used for commercial purposes  All illustrations and images included in CareNotes® are the copyrighted property of [employer] , [employer]  or Prem Burgos  The above information is an  only  It is not intended as medical advice for individual conditions or treatments  Talk to your doctor, nurse or pharmacist before following any medical regimen to see if it is safe and effective for you

## 2020-01-30 ENCOUNTER — ROUTINE PRENATAL (OUTPATIENT)
Dept: PERINATAL CARE | Facility: CLINIC | Age: 50
End: 2020-01-30
Payer: COMMERCIAL

## 2020-01-30 ENCOUNTER — TRANSCRIBE ORDERS (OUTPATIENT)
Dept: LAB | Facility: HOSPITAL | Age: 50
End: 2020-01-30

## 2020-01-30 ENCOUNTER — ROUTINE PRENATAL (OUTPATIENT)
Dept: OBGYN CLINIC | Facility: CLINIC | Age: 50
End: 2020-01-30

## 2020-01-30 ENCOUNTER — APPOINTMENT (OUTPATIENT)
Dept: LAB | Facility: HOSPITAL | Age: 50
End: 2020-01-30
Attending: STUDENT IN AN ORGANIZED HEALTH CARE EDUCATION/TRAINING PROGRAM
Payer: COMMERCIAL

## 2020-01-30 ENCOUNTER — ULTRASOUND (OUTPATIENT)
Dept: PERINATAL CARE | Facility: CLINIC | Age: 50
End: 2020-01-30
Payer: COMMERCIAL

## 2020-01-30 VITALS — WEIGHT: 145 LBS | DIASTOLIC BLOOD PRESSURE: 62 MMHG | SYSTOLIC BLOOD PRESSURE: 108 MMHG | BODY MASS INDEX: 26.52 KG/M2

## 2020-01-30 VITALS
SYSTOLIC BLOOD PRESSURE: 104 MMHG | HEART RATE: 92 BPM | WEIGHT: 145.6 LBS | BODY MASS INDEX: 26.79 KG/M2 | DIASTOLIC BLOOD PRESSURE: 58 MMHG | HEIGHT: 62 IN

## 2020-01-30 DIAGNOSIS — Z36.89 ENCOUNTER FOR ULTRASOUND TO CHECK FETAL GROWTH: ICD-10-CM

## 2020-01-30 DIAGNOSIS — O09.519 HIGH-RISK PREGNANCY, PRIMIGRAVIDA OF ADVANCED MATERNAL AGE: ICD-10-CM

## 2020-01-30 DIAGNOSIS — O09.519 HIGH-RISK PREGNANCY, PRIMIGRAVIDA OF ADVANCED MATERNAL AGE: Primary | ICD-10-CM

## 2020-01-30 DIAGNOSIS — O09.93 PREGNANCY, SUPERVISION, HIGH-RISK, THIRD TRIMESTER: ICD-10-CM

## 2020-01-30 DIAGNOSIS — Z33.1 PREGNANT STATE, INCIDENTAL: Primary | ICD-10-CM

## 2020-01-30 DIAGNOSIS — O24.414 INSULIN CONTROLLED GESTATIONAL DIABETES MELLITUS (GDM) IN THIRD TRIMESTER: ICD-10-CM

## 2020-01-30 DIAGNOSIS — O24.414 INSULIN CONTROLLED GESTATIONAL DIABETES MELLITUS (GDM) IN THIRD TRIMESTER: Primary | ICD-10-CM

## 2020-01-30 LAB
BASOPHILS # BLD AUTO: 0.04 THOUSANDS/ΜL (ref 0–0.1)
BASOPHILS NFR BLD AUTO: 0 % (ref 0–1)
EOSINOPHIL # BLD AUTO: 0.22 THOUSAND/ΜL (ref 0–0.61)
EOSINOPHIL NFR BLD AUTO: 2 % (ref 0–6)
ERYTHROCYTE [DISTWIDTH] IN BLOOD BY AUTOMATED COUNT: 18.2 % (ref 11.6–15.1)
HCT VFR BLD AUTO: 32.2 % (ref 34.8–46.1)
HGB BLD-MCNC: 10.1 G/DL (ref 11.5–15.4)
IMM GRANULOCYTES # BLD AUTO: 0.09 THOUSAND/UL (ref 0–0.2)
IMM GRANULOCYTES NFR BLD AUTO: 1 % (ref 0–2)
LYMPHOCYTES # BLD AUTO: 1 THOUSANDS/ΜL (ref 0.6–4.47)
LYMPHOCYTES NFR BLD AUTO: 10 % (ref 14–44)
MCH RBC QN AUTO: 22.3 PG (ref 26.8–34.3)
MCHC RBC AUTO-ENTMCNC: 31.4 G/DL (ref 31.4–37.4)
MCV RBC AUTO: 71 FL (ref 82–98)
MONOCYTES # BLD AUTO: 0.72 THOUSAND/ΜL (ref 0.17–1.22)
MONOCYTES NFR BLD AUTO: 7 % (ref 4–12)
NEUTROPHILS # BLD AUTO: 8.38 THOUSANDS/ΜL (ref 1.85–7.62)
NEUTS SEG NFR BLD AUTO: 80 % (ref 43–75)
NRBC BLD AUTO-RTO: 0 /100 WBCS
PLATELET # BLD AUTO: 192 THOUSANDS/UL (ref 149–390)
PMV BLD AUTO: 12.8 FL (ref 8.9–12.7)
RBC # BLD AUTO: 4.53 MILLION/UL (ref 3.81–5.12)
SL AMB  POCT GLUCOSE, UA: NEGATIVE
SL AMB POCT URINE PROTEIN: NEGATIVE
WBC # BLD AUTO: 10.45 THOUSAND/UL (ref 4.31–10.16)

## 2020-01-30 PROCEDURE — 59025 FETAL NON-STRESS TEST: CPT | Performed by: OBSTETRICS & GYNECOLOGY

## 2020-01-30 PROCEDURE — 36415 COLL VENOUS BLD VENIPUNCTURE: CPT

## 2020-01-30 PROCEDURE — 76816 OB US FOLLOW-UP PER FETUS: CPT | Performed by: OBSTETRICS & GYNECOLOGY

## 2020-01-30 PROCEDURE — 85025 COMPLETE CBC W/AUTO DIFF WBC: CPT

## 2020-01-30 PROCEDURE — 99212 OFFICE O/P EST SF 10 MIN: CPT | Performed by: OBSTETRICS & GYNECOLOGY

## 2020-01-30 PROCEDURE — PNV: Performed by: NURSE PRACTITIONER

## 2020-01-30 PROCEDURE — NC001 PR NO CHARGE

## 2020-01-30 PROCEDURE — 86592 SYPHILIS TEST NON-TREP QUAL: CPT

## 2020-01-30 RX ORDER — FAMOTIDINE 10 MG
10 TABLET ORAL 2 TIMES DAILY
COMMUNITY
End: 2021-06-10 | Stop reason: ALTCHOICE

## 2020-01-30 NOTE — PROGRESS NOTES
[address] [facility] [address]: Ms Rosa Nair was seen today at 32w1d for NST (found under the pregnancy episode) which I reviewed the RN assessment and agree, and fetal growth ultrasound (see ultrasound report under OB procedures tab)  Please don't hesitate to contact our office with any concerns or questions    Bridger Adams MD

## 2020-01-30 NOTE — ASSESSMENT & PLAN NOTE
Dave Stapleton is a 47year old  who presents for routine prenatal visit @ 32w1d  Denies LOF, vaginal bleeding, regular uterine contractions, cramping, headaches or visual changes  She is having brown spotting thought to be due to cervical polyp  Reports good fetal movement  Taking PNV daily as prescribed  Flu and TDap up to date  Reviewed PTL/Labor precautions and FKC      Growth scan:  AC 20%, HC 12%, LESLEE 15 3, growth 28th percentile  Repeat 4 weeks per MFM  Not sure if delivering at  [facility] or [place] -will go for tour of [place] and let us know at next visit      Plans to breastfeed   Baby is breech

## 2020-01-30 NOTE — PATIENT INSTRUCTIONS
Pregnancy at [address] to [address]:   What changes are happening in my body? You may continue to have symptoms such as shortness of breath, heartburn, contractions, or swelling of your ankles and feet  You may be gaining about 1 pound a week now  How do I care for myself at this stage of my pregnancy? · Eat a variety of healthy foods  Healthy foods include fruits, vegetables, whole-grain breads, low-fat dairy foods, beans, lean meats, and fish  Drink liquids as directed  Ask how much liquid to drink each day and which liquids are best for you  Limit caffeine to less than 200 milligrams each day  Limit your intake of fish to 2 servings each week  Choose fish low in mercury such as canned light tuna, shrimp, salmon, cod, or tilapia  Do not  eat fish high in mercury such as swordfish, tilefish, pierre mackerel, and shark  · Manage heartburn  by eating 4 or 5 small meals each day instead of large meals  Avoid spicy food  · Manage swelling  by lying down and putting your feet up  · Take prenatal vitamins as directed  Your need for certain vitamins and minerals, such as folic acid, increases during pregnancy  Prenatal vitamins provide some of the extra vitamins and minerals you need  Prenatal vitamins may also help to decrease the risk of certain birth defects  · Talk to your healthcare provider about exercise  Moderate exercise can help you stay fit  Your healthcare provider will help you plan an exercise program that is safe for you during pregnancy  · Do not smoke  If you smoke, it is never too late to quit  Smoking increases your risk of a miscarriage and other health problems during your pregnancy  Smoking can cause your baby to be born too early or weigh less at birth  Ask your healthcare provider for information if you need help quitting  · Do not drink alcohol  Alcohol passes from your body to your baby through the placenta   It can affect your baby's brain development and cause fetal alcohol syndrome (FAS)  FAS is a group of conditions that causes mental, behavior, and growth problems  · Talk to your healthcare provider before you take any medicines  Many medicines may harm your baby if you take them when you are pregnant  Do not take any medicines, vitamins, herbs, or supplements without first talking to your healthcare provider  Never use illegal or street drugs (such as marijuana or cocaine) while you are pregnant  What are some safety tips during pregnancy? · Avoid hot tubs and saunas  Do not use a hot tub or sauna while you are pregnant, especially during your first trimester  Hot tubs and saunas may raise your baby's temperature and increase the risk of birth defects  · Avoid toxoplasmosis  This is an infection caused by eating raw meat or being around infected cat feces  It can cause birth defects, miscarriages, and other problems  Wash your hands after you touch raw meat  Make sure any meat is well-cooked before you eat it  Avoid raw eggs and unpasteurized milk  Use gloves or ask someone else to clean your cat's litter box while you are pregnant  What changes are happening with my baby? By 34 weeks, your baby may weigh more than 5 pounds  Your baby will be about 12 ½ inches long from the top of the head to the rump (baby's bottom)  Your baby is gaining about ½ pound a week  Your baby's eyes open and close now  Your baby's kicks and movements are more forceful at this time  What do I need to know about prenatal care? Your healthcare provider will check your blood pressure and weight  You may also need the following:  · A urine test  may also be done to check for sugar and protein  These can be signs of gestational diabetes or infection  Protein in your urine may also be a sign of preeclampsia  Preeclampsia is a condition that can develop during week 20 or later of your pregnancy   It causes high blood pressure, and it can cause problems with your kidneys and other organs  · A Tdap vaccine  may be recommended by your healthcare provider  · Fundal height  is a measurement of your uterus to check your baby's growth  This number is usually the same as the number of weeks that you have been pregnant  Your healthcare provider may also check your baby's position  · Your baby's heart rate  will be checked  When should I seek immediate care? · You develop a severe headache that does not go away  · You have new or increased vision changes, such as blurred or spotted vision  · You have new or increased swelling in your face or hands  · You have vaginal spotting or bleeding  · Your water broke or you feel warm water gushing or trickling from your vagina  When should I contact my healthcare provider? · You have more than 5 contractions in 1 hour  · You notice any changes in your baby's movements  · You have abdominal cramps, pressure, or tightening  · You have a change in vaginal discharge  · You have chills or a fever  · You have vaginal itching, burning, or pain  · You have yellow, green, white, or foul-smelling vaginal discharge  · You have pain or burning when you urinate, less urine than usual, or pink or bloody urine  · You have questions or concerns about your condition or care  CARE AGREEMENT:   You have the right to help plan your care  Learn about your health condition and how it may be treated  Discuss treatment options with your caregivers to decide what care you want to receive  You always have the right to refuse treatment  The above information is an  only  It is not intended as medical advice for individual conditions or treatments  Talk to your doctor, nurse or pharmacist before following any medical regimen to see if it is safe and effective for you    © 2017 [employer] Information is for End User's use only and may not be sold, redistributed or otherwise used for commercial purposes  All illustrations and images included in CareNotes® are the copyrighted property of A [employer] M [employer]  or Prem Burgos

## 2020-01-30 NOTE — PROGRESS NOTES
Insulin controlled gestational diabetes mellitus (GDM) in third trimester    Lab Results   Component Value Date    HGBA1C 4 4 10/23/2019   Sugars and insulin doses are managed by diabetic education  Slip given for Hgb A1C  Biweekly NST and weekly LESLEE  High-risk pregnancy, primigravida of advanced maternal age  Dave Stapleton is a 47year old  who presents for routine prenatal visit @ 32w1d  Denies LOF, vaginal bleeding, regular uterine contractions, cramping, headaches or visual changes  She is having brown spotting thought to be due to cervical polyp  Reports good fetal movement  Taking PNV daily as prescribed  Flu and TDap up to date  Reviewed PTL/Labor precautions and FKC      Growth scan:  AC 20%, HC 12%, LESLEE 15 3, growth 28th percentile  Repeat 4 weeks per MFM  Not sure if delivering at  [place] or [facility] -will go for tour of [facility] and let us know at next visit      Plans to breastfeed   Baby is breech

## 2020-01-30 NOTE — ASSESSMENT & PLAN NOTE
Lab Results   Component Value Date    HGBA1C 4 4 10/23/2019   Sugars and insulin doses are managed by diabetic education  Slip given for Hgb A1C  Biweekly NST and weekly LESLEE  dustin guzman

## 2020-01-30 NOTE — PROGRESS NOTES
Patient has tour of [address] [place] [address] 2/25/20 and will decide what campus she wants at that time  She is leaning toward [place] at this point  Patient saw Dr Mihaela Bowens and was told delivery at 43 weeks  Baby is breech at this time  A2GDM  Type and screen and Hem A1C ordered today  O positive from 2014  Check-in card given  Perineal massage given

## 2020-01-31 ENCOUNTER — TELEPHONE (OUTPATIENT)
Dept: OBGYN CLINIC | Facility: CLINIC | Age: 50
End: 2020-01-31

## 2020-01-31 LAB — RPR SER QL: NORMAL

## 2020-01-31 NOTE — TELEPHONE ENCOUNTER
----- Message from Maryanne English MD sent at 1/31/2020 11:00 AM EST -----  Please have BODØ start taking iron supplementation daily if she is not already doing so for iron deficiency anemia    Thank you! -AMM

## 2020-01-31 NOTE — TELEPHONE ENCOUNTER
Called pt- , L/M, per communication consent form, advising pt of abn hgb  & recommendation to start daily iron, if not already doing so  Advised to call with any further questions/concerns

## 2020-02-04 ENCOUNTER — ULTRASOUND (OUTPATIENT)
Dept: PERINATAL CARE | Facility: CLINIC | Age: 50
End: 2020-02-04
Payer: COMMERCIAL

## 2020-02-04 VITALS
HEIGHT: 62 IN | HEART RATE: 91 BPM | WEIGHT: 146.4 LBS | SYSTOLIC BLOOD PRESSURE: 105 MMHG | DIASTOLIC BLOOD PRESSURE: 68 MMHG | BODY MASS INDEX: 26.94 KG/M2

## 2020-02-04 DIAGNOSIS — Z3A.32 32 WEEKS GESTATION OF PREGNANCY: ICD-10-CM

## 2020-02-04 DIAGNOSIS — O09.819 PREGNANCY RESULTING FROM ASSISTED REPRODUCTIVE TECHNOLOGY, ANTEPARTUM: ICD-10-CM

## 2020-02-04 DIAGNOSIS — O09.519 HIGH-RISK PREGNANCY, PRIMIGRAVIDA OF ADVANCED MATERNAL AGE: ICD-10-CM

## 2020-02-04 DIAGNOSIS — O24.414 INSULIN CONTROLLED GESTATIONAL DIABETES MELLITUS (GDM) IN THIRD TRIMESTER: Primary | ICD-10-CM

## 2020-02-04 PROCEDURE — 76815 OB US LIMITED FETUS(S): CPT | Performed by: OBSTETRICS & GYNECOLOGY

## 2020-02-04 PROCEDURE — 59025 FETAL NON-STRESS TEST: CPT | Performed by: OBSTETRICS & GYNECOLOGY

## 2020-02-04 NOTE — PATIENT INSTRUCTIONS

## 2020-02-04 NOTE — LETTER
February 4, 2020     [address] [facility] [address]    Patient: Margaret Whatley   YOB: 1970   Date of Visit: 2/4/2020       Dear Dr Mariel Villalobos: Thank you for referring Margaret Whatley to me for evaluation  Below are my notes for this consultation  If you have questions, please do not hesitate to call me  I look forward to following your patient along with you  Sincerely,        Margarette Herron MD        CC: No Recipients  Margarette Herron MD  2/4/2020  5:56 PM  Incomplete  NST is reactive    Margarette BARBOUR

## 2020-02-05 ENCOUNTER — DOCUMENTATION (OUTPATIENT)
Dept: PERINATAL CARE | Facility: CLINIC | Age: 50
End: 2020-02-05

## 2020-02-05 NOTE — PROGRESS NOTES
Date:  20  RE: Rocío Fleming    : 1970  JOSE: 3/25/20  EGA:  30w5d  A2GDM            Via [employer] glucose flowsheet  Current regimen:  Levemir 28 units at 9:30 PM daily  Humalog 5 units before lunch, 10 units before lunch and 14 units before dinner  1800 calorie GDM diet with 3 meals/snacks including balance ofr carbohydrate, protein and fat  Eat 3 meals and 3 snacks including combination of carbohydrates, protein and fat per meal/snack  No more than 8 to 10 hours of fasting overnight  Self monitoring blood glucose fasting and 2 hours after start of meals with One Touch Verio meter  Agreed to walk after dinner nightly previously  Plan:  Continue Levemir 28 units at 9:30 PM daily  Continue Humalog 5-10-14-0 before meals 1-2-3  Always have glucose available to treat hypoglycemia, use 15 by 15 rule  10/23/19 JrG3r-1 4%; repeat A1c   1/3/20 ultrasound: fetal growth appeared normal and LESLEE normal     Date due to report next:  Tuesday, 20 or sooner if needed       [place] SCAR Puri, CDE  Diabetes Educator   Diabetes and Pregnancy Program

## 2020-02-07 ENCOUNTER — ROUTINE PRENATAL (OUTPATIENT)
Dept: PERINATAL CARE | Facility: CLINIC | Age: 50
End: 2020-02-07
Payer: COMMERCIAL

## 2020-02-07 VITALS
WEIGHT: 145.8 LBS | BODY MASS INDEX: 26.83 KG/M2 | SYSTOLIC BLOOD PRESSURE: 112 MMHG | HEART RATE: 86 BPM | DIASTOLIC BLOOD PRESSURE: 64 MMHG | HEIGHT: 62 IN

## 2020-02-07 DIAGNOSIS — O24.414 INSULIN CONTROLLED GESTATIONAL DIABETES MELLITUS (GDM) IN THIRD TRIMESTER: Primary | ICD-10-CM

## 2020-02-07 DIAGNOSIS — Z3A.33 33 WEEKS GESTATION OF PREGNANCY: ICD-10-CM

## 2020-02-07 PROCEDURE — 59025 FETAL NON-STRESS TEST: CPT | Performed by: OBSTETRICS & GYNECOLOGY

## 2020-02-07 NOTE — PATIENT INSTRUCTIONS

## 2020-02-10 DIAGNOSIS — Z3A.29 29 WEEKS GESTATION OF PREGNANCY: ICD-10-CM

## 2020-02-10 DIAGNOSIS — O24.414 INSULIN CONTROLLED GESTATIONAL DIABETES MELLITUS (GDM) IN THIRD TRIMESTER: ICD-10-CM

## 2020-02-10 RX ORDER — INSULIN LISPRO 100 [IU]/ML
INJECTION, SOLUTION INTRAVENOUS; SUBCUTANEOUS
Qty: 15 ML | Refills: 0 | Status: SHIPPED | OUTPATIENT
Start: 2020-02-10 | End: 2020-02-24 | Stop reason: SDUPTHER

## 2020-02-14 ENCOUNTER — ROUTINE PRENATAL (OUTPATIENT)
Dept: OBGYN CLINIC | Facility: CLINIC | Age: 50
End: 2020-02-14

## 2020-02-14 ENCOUNTER — ULTRASOUND (OUTPATIENT)
Dept: PERINATAL CARE | Facility: CLINIC | Age: 50
End: 2020-02-14
Payer: COMMERCIAL

## 2020-02-14 VITALS
HEIGHT: 62 IN | SYSTOLIC BLOOD PRESSURE: 110 MMHG | WEIGHT: 146.4 LBS | DIASTOLIC BLOOD PRESSURE: 60 MMHG | HEART RATE: 82 BPM | BODY MASS INDEX: 26.94 KG/M2

## 2020-02-14 VITALS — BODY MASS INDEX: 26.7 KG/M2 | DIASTOLIC BLOOD PRESSURE: 64 MMHG | WEIGHT: 146 LBS | SYSTOLIC BLOOD PRESSURE: 108 MMHG

## 2020-02-14 DIAGNOSIS — Z3A.34 34 WEEKS GESTATION OF PREGNANCY: ICD-10-CM

## 2020-02-14 DIAGNOSIS — O24.414 INSULIN CONTROLLED GESTATIONAL DIABETES MELLITUS (GDM) IN THIRD TRIMESTER: ICD-10-CM

## 2020-02-14 DIAGNOSIS — O24.414 INSULIN CONTROLLED GESTATIONAL DIABETES MELLITUS (GDM) IN THIRD TRIMESTER: Primary | ICD-10-CM

## 2020-02-14 DIAGNOSIS — O09.519 HIGH-RISK PREGNANCY, PRIMIGRAVIDA OF ADVANCED MATERNAL AGE: ICD-10-CM

## 2020-02-14 DIAGNOSIS — Z34.83 PRENATAL CARE, SUBSEQUENT PREGNANCY, THIRD TRIMESTER: Primary | ICD-10-CM

## 2020-02-14 PROBLEM — Z3A.30 30 WEEKS GESTATION OF PREGNANCY: Status: RESOLVED | Noted: 2019-11-21 | Resolved: 2020-02-14

## 2020-02-14 LAB
SL AMB  POCT GLUCOSE, UA: NORMAL
SL AMB POCT URINE PROTEIN: NORMAL

## 2020-02-14 PROCEDURE — 76815 OB US LIMITED FETUS(S): CPT | Performed by: OBSTETRICS & GYNECOLOGY

## 2020-02-14 PROCEDURE — 59025 FETAL NON-STRESS TEST: CPT | Performed by: OBSTETRICS & GYNECOLOGY

## 2020-02-14 PROCEDURE — PNV: Performed by: PHYSICIAN ASSISTANT

## 2020-02-14 NOTE — ASSESSMENT & PLAN NOTE
Followed by JAGRUTI  Currently going for twice weekly APFS  For delivery at 39 weeks    Lab Results   Component Value Date    HGBA1C 4 4 10/23/2019

## 2020-02-14 NOTE — PROGRESS NOTES
Problem List Items Addressed This Visit        Endocrine    Insulin controlled gestational diabetes mellitus (GDM) in third trimester     Followed by MFM  Currently going for twice weekly APFS  For delivery at 44 weeks    Lab Results   Component Value Date    HGBA1C 4 4 10/23/2019               Other    High-risk pregnancy, primigravida of advanced maternal age     47 y o  female here for routine PN visit at [facility]:  Stoney Mins well overall  Good fetal movement  It's a girl  Will breast feed, has pump already              Other Visit Diagnoses     Prenatal care, subsequent pregnancy, third trimester    -  Primary

## 2020-02-14 NOTE — LETTER
February 14, 2020     Prashant Cordoba MD  [facility] [address]    Patient: Rocío Fleming   YOB: 1970   Date of Visit: 2/14/2020       Dear Dr Rafiq Peterson: Thank you for referring Rocío Fleming to me for evaluation  Below are my notes for this consultation  If you have questions, please do not hesitate to call me  I look forward to following your patient along with you  Sincerely,        Yoselin Pedroza MD        CC: No Recipients  Yoselin Pedroza MD  2/14/2020  6:51 PM  Sign at close encounter  NST is reactive   Yoselin Pedroza MD

## 2020-02-14 NOTE — ASSESSMENT & PLAN NOTE
47 y o  female here for routine PN visit at [facility]:  Anya Orr well overall  Good fetal movement  It's a girl  Will breast feed, has pump already

## 2020-02-18 ENCOUNTER — ULTRASOUND (OUTPATIENT)
Dept: PERINATAL CARE | Facility: CLINIC | Age: 50
End: 2020-02-18
Payer: COMMERCIAL

## 2020-02-18 VITALS
HEART RATE: 88 BPM | BODY MASS INDEX: 27.2 KG/M2 | HEIGHT: 62 IN | WEIGHT: 147.8 LBS | SYSTOLIC BLOOD PRESSURE: 116 MMHG | DIASTOLIC BLOOD PRESSURE: 56 MMHG

## 2020-02-18 DIAGNOSIS — O24.414 INSULIN CONTROLLED GESTATIONAL DIABETES MELLITUS (GDM) IN THIRD TRIMESTER: ICD-10-CM

## 2020-02-18 DIAGNOSIS — Z3A.34 34 WEEKS GESTATION OF PREGNANCY: Primary | ICD-10-CM

## 2020-02-18 PROCEDURE — 76815 OB US LIMITED FETUS(S): CPT | Performed by: OBSTETRICS & GYNECOLOGY

## 2020-02-18 PROCEDURE — 59025 FETAL NON-STRESS TEST: CPT | Performed by: OBSTETRICS & GYNECOLOGY

## 2020-02-19 ENCOUNTER — DOCUMENTATION (OUTPATIENT)
Dept: PERINATAL CARE | Facility: CLINIC | Age: 50
End: 2020-02-19

## 2020-02-19 DIAGNOSIS — O09.819 PREGNANCY RESULTING FROM ASSISTED REPRODUCTIVE TECHNOLOGY, ANTEPARTUM: ICD-10-CM

## 2020-02-19 DIAGNOSIS — Z3A.35 35 WEEKS GESTATION OF PREGNANCY: ICD-10-CM

## 2020-02-19 DIAGNOSIS — O24.414 INSULIN CONTROLLED GESTATIONAL DIABETES MELLITUS (GDM) IN THIRD TRIMESTER: Primary | ICD-10-CM

## 2020-02-19 DIAGNOSIS — O09.519 HIGH-RISK PREGNANCY, PRIMIGRAVIDA OF ADVANCED MATERNAL AGE: ICD-10-CM

## 2020-02-19 NOTE — PROGRESS NOTES
Date:  20  RE: Celine Fulton    : 1970  JOSE: 3/25/20  EGA:  35w0d  A2GDM            Via [employer] glucose flowsheet  Current regimen:  Levemir 28 units at 9:30 PM daily  Humalog 5 units before lunch, 10 units before lunch and 14 units before dinner  1800 calorie GDM diet with 3 meals/snacks including balance ofr carbohydrate, protein and fat  Eat 3 meals and 3 snacks including combination of carbohydrates, protein and fat per meal/snack  No more than 8 to 10 hours of fasting overnight  Self monitoring blood glucose fasting and 2 hours after start of meals with One Touch Verio meter  Agreed to walk after dinner nightly previously  Plan:  Continue Levemir 28 units at 9:30 PM daily  Continue Humalog 5-10-14-0 before meals 1-2-3  Always have glucose available to treat hypoglycemia, use 15 by 15 rule  10/23/19 [id number]-3 4%; repeat A1c   1/3/20 ultrasound: fetal growth appeared normal and LESLEE normal     Date due to report next:  Tuesday, 20 or sooner if needed       SCAR Chi, CDE, BC-ADM  Diabetes Educator   Diabetes and Pregnancy Program

## 2020-02-21 ENCOUNTER — ROUTINE PRENATAL (OUTPATIENT)
Dept: PERINATAL CARE | Facility: CLINIC | Age: 50
End: 2020-02-21
Payer: COMMERCIAL

## 2020-02-21 VITALS
WEIGHT: 147.5 LBS | HEART RATE: 87 BPM | HEIGHT: 62 IN | SYSTOLIC BLOOD PRESSURE: 112 MMHG | BODY MASS INDEX: 27.14 KG/M2 | DIASTOLIC BLOOD PRESSURE: 73 MMHG

## 2020-02-21 DIAGNOSIS — O24.414 INSULIN CONTROLLED GESTATIONAL DIABETES MELLITUS (GDM) IN THIRD TRIMESTER: Primary | ICD-10-CM

## 2020-02-21 DIAGNOSIS — O09.519 HIGH-RISK PREGNANCY, PRIMIGRAVIDA OF ADVANCED MATERNAL AGE: ICD-10-CM

## 2020-02-21 DIAGNOSIS — Z3A.35 35 WEEKS GESTATION OF PREGNANCY: ICD-10-CM

## 2020-02-21 PROCEDURE — 59025 FETAL NON-STRESS TEST: CPT | Performed by: OBSTETRICS & GYNECOLOGY

## 2020-02-22 NOTE — PROGRESS NOTES
[address] [facility] [address]: Ms Shawna Sebastian was seen 2/21/20 at 35w2d for NST (found under the pregnancy episode) which I reviewed the RN assessment and agree    Please don't hesitate to contact our office with any concerns or questions   -Apoorva Mora MD

## 2020-02-24 ENCOUNTER — ULTRASOUND (OUTPATIENT)
Dept: PERINATAL CARE | Facility: CLINIC | Age: 50
End: 2020-02-24
Payer: COMMERCIAL

## 2020-02-24 VITALS
DIASTOLIC BLOOD PRESSURE: 64 MMHG | HEIGHT: 62 IN | WEIGHT: 149.2 LBS | HEART RATE: 91 BPM | BODY MASS INDEX: 27.46 KG/M2 | SYSTOLIC BLOOD PRESSURE: 117 MMHG

## 2020-02-24 DIAGNOSIS — Z3A.29 29 WEEKS GESTATION OF PREGNANCY: ICD-10-CM

## 2020-02-24 DIAGNOSIS — O24.414 INSULIN CONTROLLED GESTATIONAL DIABETES MELLITUS (GDM) IN THIRD TRIMESTER: ICD-10-CM

## 2020-02-24 DIAGNOSIS — O24.414 INSULIN CONTROLLED GESTATIONAL DIABETES MELLITUS (GDM) IN THIRD TRIMESTER: Primary | ICD-10-CM

## 2020-02-24 DIAGNOSIS — Z3A.35 35 WEEKS GESTATION OF PREGNANCY: ICD-10-CM

## 2020-02-24 PROCEDURE — 76815 OB US LIMITED FETUS(S): CPT | Performed by: OBSTETRICS & GYNECOLOGY

## 2020-02-24 PROCEDURE — 59025 FETAL NON-STRESS TEST: CPT | Performed by: OBSTETRICS & GYNECOLOGY

## 2020-02-24 RX ORDER — INSULIN LISPRO 100 [IU]/ML
INJECTION, SOLUTION INTRAVENOUS; SUBCUTANEOUS
Qty: 15 ML | Refills: 0 | Status: SHIPPED | OUTPATIENT
Start: 2020-02-24 | End: 2020-03-21 | Stop reason: HOSPADM

## 2020-02-24 RX ORDER — INSULIN DETEMIR 100 [IU]/ML
INJECTION, SOLUTION SUBCUTANEOUS
Qty: 15 ML | Refills: 0 | Status: SHIPPED | OUTPATIENT
Start: 2020-02-24 | End: 2020-03-21 | Stop reason: HOSPADM

## 2020-02-26 ENCOUNTER — DOCUMENTATION (OUTPATIENT)
Dept: PERINATAL CARE | Facility: CLINIC | Age: 50
End: 2020-02-26

## 2020-02-26 ENCOUNTER — ROUTINE PRENATAL (OUTPATIENT)
Dept: OBGYN CLINIC | Facility: CLINIC | Age: 50
End: 2020-02-26

## 2020-02-26 VITALS — SYSTOLIC BLOOD PRESSURE: 117 MMHG | BODY MASS INDEX: 27.44 KG/M2 | WEIGHT: 150 LBS | DIASTOLIC BLOOD PRESSURE: 78 MMHG

## 2020-02-26 DIAGNOSIS — O09.519 HIGH-RISK PREGNANCY, PRIMIGRAVIDA OF ADVANCED MATERNAL AGE: ICD-10-CM

## 2020-02-26 DIAGNOSIS — O24.414 INSULIN CONTROLLED GESTATIONAL DIABETES MELLITUS (GDM) IN THIRD TRIMESTER: ICD-10-CM

## 2020-02-26 DIAGNOSIS — O24.414 INSULIN CONTROLLED GESTATIONAL DIABETES MELLITUS (GDM) IN THIRD TRIMESTER: Primary | ICD-10-CM

## 2020-02-26 DIAGNOSIS — Z3A.36 36 WEEKS GESTATION OF PREGNANCY: ICD-10-CM

## 2020-02-26 DIAGNOSIS — Z34.83 PRENATAL CARE, SUBSEQUENT PREGNANCY, THIRD TRIMESTER: Primary | ICD-10-CM

## 2020-02-26 LAB
SL AMB  POCT GLUCOSE, UA: ABNORMAL
SL AMB POCT URINE PROTEIN: 1

## 2020-02-26 PROCEDURE — PNV: Performed by: STUDENT IN AN ORGANIZED HEALTH CARE EDUCATION/TRAINING PROGRAM

## 2020-02-26 NOTE — PROGRESS NOTES
Date:  20  RE: Candido Ellis    : 1970  JOSE: 3/25/20  EGA:  36w0d  A2GDM            [address]     Current regimen:  Levemir 28 units at 9:30 PM daily  Humalog 5 units before lunch, 10 units before lunch and 14 units before dinner  1800 calorie GDM diet with 3 meals/snacks including balance ofr carbohydrate, protein and fat  Eat 3 meals and 3 snacks including combination of carbohydrates, protein and fat per meal/snack  No more than 8 to 10 hours of fasting overnight  Self monitoring blood glucose fasting and 2 hours after start of meals with One Touch Verio meter  Agreed to walk after dinner nightly previously  Plan:  Continue Levemir 28 units at 9:30 PM daily  Continue Humalog 5-10-14-0 before meals 1-2-3  Always have glucose available to treat hypoglycemia, use 15 by 15 rule  10/23/19 MvQ5n-2 4%; repeat A1c   1/3/20 ultrasound: fetal growth appeared normal and LESLEE normal     Date due to report next:  Tuesday, 3/2/20 or sooner if needed       Channing Macias, SCAR, CDE, BC-ADM  Diabetes Educator   Diabetes and Pregnancy Program

## 2020-02-26 NOTE — PROGRESS NOTES
48 yo  at 36 0/7 weeks, here for routine PNV  No complaints at this time  Denies LOF/VB/ctx/decreased FM  Diagnoses and all orders for this visit:    Prenatal care, subsequent pregnancy, third trimester  -     POCT urine dip  -     Strep B DNA probe, amplification  -     Rubella antibody, IgG;  Future   - Reviewed labs, unable to find Rubella status - ordered today   - Reviewed as history of chicken pox, no need for additional vaccination against varicella   - s/p flu and tdap  Insulin controlled gestational diabetes mellitus (GDM) in third trimester   - continue with [facility]  High-risk pregnancy, primigravida of advanced maternal age   - Continue baby aspirin   - Plan for induction at 39 weeks, for [email or web address]

## 2020-02-28 ENCOUNTER — ULTRASOUND (OUTPATIENT)
Dept: PERINATAL CARE | Facility: CLINIC | Age: 50
End: 2020-02-28
Payer: COMMERCIAL

## 2020-02-28 VITALS
HEART RATE: 86 BPM | DIASTOLIC BLOOD PRESSURE: 63 MMHG | HEIGHT: 62 IN | SYSTOLIC BLOOD PRESSURE: 96 MMHG | BODY MASS INDEX: 27.23 KG/M2 | WEIGHT: 148 LBS

## 2020-02-28 DIAGNOSIS — O24.414 INSULIN CONTROLLED GESTATIONAL DIABETES MELLITUS (GDM) IN THIRD TRIMESTER: ICD-10-CM

## 2020-02-28 DIAGNOSIS — O09.519 HIGH-RISK PREGNANCY, PRIMIGRAVIDA OF ADVANCED MATERNAL AGE: ICD-10-CM

## 2020-02-28 DIAGNOSIS — O09.819 PREGNANCY RESULTING FROM ASSISTED REPRODUCTIVE TECHNOLOGY, ANTEPARTUM: ICD-10-CM

## 2020-02-28 DIAGNOSIS — Z3A.36 36 WEEKS GESTATION OF PREGNANCY: Primary | ICD-10-CM

## 2020-02-28 LAB — GP B STREP DNA SPEC QL NAA+PROBE: NEGATIVE

## 2020-02-28 PROCEDURE — 76816 OB US FOLLOW-UP PER FETUS: CPT | Performed by: OBSTETRICS & GYNECOLOGY

## 2020-02-28 PROCEDURE — 59025 FETAL NON-STRESS TEST: CPT | Performed by: OBSTETRICS & GYNECOLOGY

## 2020-03-02 ENCOUNTER — TELEPHONE (OUTPATIENT)
Dept: OBGYN CLINIC | Facility: CLINIC | Age: 50
End: 2020-03-02

## 2020-03-02 NOTE — TELEPHONE ENCOUNTER
----- Message from Kelly Bravo MD sent at 3/2/2020  9:06 AM EST -----  Please let Dina Lily know the good news that her GBS returned negative, no need for antibiotics in labor for prophylaxis    Thanks! -AMM

## 2020-03-02 NOTE — TELEPHONE ENCOUNTER
Called and spoke to pt informing that the GBS we collected returned back negative and she will not need any antibiotics in labor for prophylaxis  She confirmed understanding

## 2020-03-02 NOTE — PROGRESS NOTES
Fetal ultrasound at  36 2/7   weeks gestation  to evaluate growth  IVF pregnancy  AMA  A2GDM  See Ob procedures in EPIC  Ultrasound:  1  Fetal size = dates  EFW= 6lb 2 oz = 35%  2  No fetal anomalies observed  3  Normal LESLEE  4  Reactive NST  I reviewed the results of this ultrasound and answered  all the questions  Recommendations:  1  Follow-up ultrasound as clinically indicated  2  Twice a week NSTs  3  Daily Fetal Movement counts  Thank you for referring your patient to our offices  The limitations of ultrasound to detect all anomalies was reviewed and how it is not  a test to rule out aneuploidy  If you have any further questions do not hesitate to contact us as [phone]       Isabel Cevallos MD

## 2020-03-03 ENCOUNTER — ROUTINE PRENATAL (OUTPATIENT)
Dept: PERINATAL CARE | Facility: CLINIC | Age: 50
End: 2020-03-03
Payer: COMMERCIAL

## 2020-03-03 VITALS
HEIGHT: 62 IN | HEART RATE: 94 BPM | DIASTOLIC BLOOD PRESSURE: 65 MMHG | SYSTOLIC BLOOD PRESSURE: 127 MMHG | BODY MASS INDEX: 27.6 KG/M2 | WEIGHT: 150 LBS

## 2020-03-03 DIAGNOSIS — O09.819 PREGNANCY RESULTING FROM ASSISTED REPRODUCTIVE TECHNOLOGY, ANTEPARTUM: ICD-10-CM

## 2020-03-03 DIAGNOSIS — Z3A.36 36 WEEKS GESTATION OF PREGNANCY: Primary | ICD-10-CM

## 2020-03-03 DIAGNOSIS — O24.414 INSULIN CONTROLLED GESTATIONAL DIABETES MELLITUS (GDM) IN THIRD TRIMESTER: ICD-10-CM

## 2020-03-03 DIAGNOSIS — O09.519 HIGH-RISK PREGNANCY, PRIMIGRAVIDA OF ADVANCED MATERNAL AGE: ICD-10-CM

## 2020-03-03 PROCEDURE — 76818 FETAL BIOPHYS PROFILE W/NST: CPT | Performed by: OBSTETRICS & GYNECOLOGY

## 2020-03-03 PROCEDURE — NC001 PR NO CHARGE: Performed by: OBSTETRICS & GYNECOLOGY

## 2020-03-03 PROCEDURE — 59025 FETAL NON-STRESS TEST: CPT | Performed by: OBSTETRICS & GYNECOLOGY

## 2020-03-03 PROCEDURE — 76815 OB US LIMITED FETUS(S): CPT | Performed by: OBSTETRICS & GYNECOLOGY

## 2020-03-03 NOTE — PATIENT INSTRUCTIONS

## 2020-03-04 NOTE — PROGRESS NOTES
Fetal ultrasound examination for evaluation of LESLEE and NST at  36 6/7 weeks gestation  Hx of    AMA, 47years old IVF  A2GDM+  She is asymptomatic  See Ob procedures in EPIC  1  LESLEE   wnl 14 4  2  NST Non reactive  3  Nl BPP 8/8      Recommendations; 1  Twice a week NSTs, once a week LESLEE  2  Daily fetal movement counts  Thank you for referring your patient to our offices  If you have any further questions do not hesitate to contact us as [phone]      Jordon Shea MD

## 2020-03-05 ENCOUNTER — TELEPHONE (OUTPATIENT)
Dept: OTHER | Facility: OTHER | Age: 50
End: 2020-03-05

## 2020-03-05 ENCOUNTER — HOSPITAL ENCOUNTER (OUTPATIENT)
Facility: HOSPITAL | Age: 50
Discharge: HOME | End: 2020-03-05
Attending: STUDENT IN AN ORGANIZED HEALTH CARE EDUCATION/TRAINING PROGRAM | Admitting: STUDENT IN AN ORGANIZED HEALTH CARE EDUCATION/TRAINING PROGRAM
Payer: COMMERCIAL

## 2020-03-05 ENCOUNTER — TELEPHONE (OUTPATIENT)
Dept: OBGYN CLINIC | Facility: CLINIC | Age: 50
End: 2020-03-05

## 2020-03-05 ENCOUNTER — ROUTINE PRENATAL (OUTPATIENT)
Dept: OBGYN CLINIC | Facility: CLINIC | Age: 50
End: 2020-03-05

## 2020-03-05 VITALS — WEIGHT: 150.6 LBS | BODY MASS INDEX: 27.55 KG/M2 | DIASTOLIC BLOOD PRESSURE: 82 MMHG | SYSTOLIC BLOOD PRESSURE: 124 MMHG

## 2020-03-05 VITALS
HEART RATE: 81 BPM | RESPIRATION RATE: 16 BRPM | TEMPERATURE: 97.7 F | DIASTOLIC BLOOD PRESSURE: 70 MMHG | SYSTOLIC BLOOD PRESSURE: 115 MMHG

## 2020-03-05 DIAGNOSIS — O24.414 INSULIN CONTROLLED GESTATIONAL DIABETES MELLITUS (GDM) IN THIRD TRIMESTER: ICD-10-CM

## 2020-03-05 DIAGNOSIS — O09.819 PREGNANCY RESULTING FROM ASSISTED REPRODUCTIVE TECHNOLOGY, ANTEPARTUM: ICD-10-CM

## 2020-03-05 DIAGNOSIS — Z11.3 SCREENING FOR STDS (SEXUALLY TRANSMITTED DISEASES): Primary | ICD-10-CM

## 2020-03-05 DIAGNOSIS — Z34.83 PRENATAL CARE, SUBSEQUENT PREGNANCY, THIRD TRIMESTER: ICD-10-CM

## 2020-03-05 PROBLEM — Z3A.37 37 WEEKS GESTATION OF PREGNANCY: Status: ACTIVE | Noted: 2020-03-05

## 2020-03-05 LAB
SL AMB  POCT GLUCOSE, UA: ABNORMAL
SL AMB POCT URINE PROTEIN: ABNORMAL

## 2020-03-05 PROCEDURE — PNV: Performed by: NURSE PRACTITIONER

## 2020-03-05 PROCEDURE — NC001 PR NO CHARGE: Performed by: STUDENT IN AN ORGANIZED HEALTH CARE EDUCATION/TRAINING PROGRAM

## 2020-03-05 PROCEDURE — 99214 OFFICE O/P EST MOD 30 MIN: CPT

## 2020-03-05 NOTE — PROGRESS NOTES
Prenatal care, subsequent pregnancy, third trimester  Albert Lazaro is a 47year old  who presents for routine prenatal visit  37w1d  Denies LOF, vaginal bleeding, regular uterine contractions, cramping, headaches or visual changes  Reports good fetal movement  Taking PNV daily as prescribed  Flu and TDap up to date  Reviewed labor precautions and FKC  GBS negative  Delivering @ [facility]  Having a girl  Will schedule IOL for 3/18/20  She is concerned with epidural as she has a friend who recently lost a baby due to bradycardia after epidural   Discussed her concerns  Advised to discuss with on call doctor  Plans to breastfeed   Has last few weeks of pregnancy paper & Perineal massage    Insulin controlled gestational diabetes mellitus (GDM) in third trimester    Lab Results   Component Value Date    HGBA1C 4 4 10/23/2019   MFM for NST and LESLEE 2x week  3/3 NST was non-reactive with BPP of 8/8 and LESLEE of 14 4  Being followed by diabetic pathways  Sugars well controlled on insulin  Pregnancy resulting from assisted reproductive technology, antepartum  Will schedule IOL 3/18 with cervical ripening on 3/17  Continue baby ASA

## 2020-03-05 NOTE — TELEPHONE ENCOUNTER
Please schedule her for IOL on 3/18  She will need cervical ripening on 3/17  She is an induction for insulin dependent DM and AMA  She is delivering at [facility]

## 2020-03-05 NOTE — ASSESSMENT & PLAN NOTE
BODØ is a 47year old  who presents for routine prenatal visit  37w1d  Denies LOF, vaginal bleeding, regular uterine contractions, cramping, headaches or visual changes  Reports good fetal movement  Taking PNV daily as prescribed  Flu and TDap up to date  Reviewed labor precautions and FKC  GBS negative  Delivering @ [facility]  Having a girl  Will schedule IOL for 3/18/20  She is concerned with epidural as she has a friend who recently lost a baby due to bradycardia after epidural   Discussed her concerns  Advised to discuss with on call doctor      Plans to breastfeed   Has last few weeks of pregnancy paper & Perineal massage

## 2020-03-05 NOTE — ASSESSMENT & PLAN NOTE
Lab Results   Component Value Date    HGBA1C 4 4 10/23/2019   MFM for NST and LESLEE 2x week  3/3 NST was non-reactive with BPP of 8/8 and LESLEE of 14 4  Being followed by diabetic pathways  Sugars well controlled on insulin

## 2020-03-05 NOTE — TELEPHONE ENCOUNTER
Dr Boris Brown are on call at [place] 3/18  May I add an iol for an insulin dependant , AMA patient  She will go in on 3/17 for cervical ripening  Ty Ape  [birth year]  Thank you  I got approval from [facility] [place]  Dr Rodolfo Greene, you are on call in [place] on 3/17   May we bring in a pt - iol for cervical ripening - is an insulin dependent  Diabetic and AMA - at 8 pm   Ty Ape  [birth year]  Thank you  Approval obtained from [facility] - to pt

## 2020-03-05 NOTE — PATIENT INSTRUCTIONS
Pregnancy at [address] to [address]:   What changes are happening in my body? You are considered full term at the beginning of 37 weeks  Your breathing may be easier if your baby has moved down into a head-down position  You may need to urinate more often because the baby may be pressing on your bladder  You may also feel more discomfort and get tired easily  How do I care for myself at this stage of my pregnancy? · Eat a variety of healthy foods  Healthy foods include fruits, vegetables, whole-grain breads, low-fat dairy foods, beans, lean meats, and fish  Drink liquids as directed  Ask how much liquid to drink each day and which liquids are best for you  Limit caffeine to less than 200 milligrams each day  Limit your intake of fish to 2 servings each week  Choose fish low in mercury such as canned light tuna, shrimp, salmon, cod, or tilapia  Do not  eat fish high in mercury such as swordfish, tilefish, pierre mackerel, and shark  · Take prenatal vitamins as directed  Your need for certain vitamins and minerals, such as folic acid, increases during pregnancy  Prenatal vitamins provide some of the extra vitamins and minerals you need  Prenatal vitamins may also help to decrease the risk of certain birth defects  · Rest as needed  Put your feet up if you have swelling in your ankles and feet  · Do not smoke  If you smoke, it is never too late to quit  Smoking increases your risk of a miscarriage and other health problems during your pregnancy  Smoking can cause your baby to be born too early or weigh less at birth  Ask your healthcare provider for information if you need help quitting  · Do not drink alcohol  Alcohol passes from your body to your baby through the placenta  It can affect your baby's brain development and cause fetal alcohol syndrome (FAS)  FAS is a group of conditions that causes mental, behavior, and growth problems       · Talk to your healthcare provider before you take any medicines  Many medicines may harm your baby if you take them when you are pregnant  Do not take any medicines, vitamins, herbs, or supplements without first talking to your healthcare provider  Never use illegal or street drugs (such as marijuana or cocaine) while you are pregnant  · Talk to your healthcare provider before you travel  You may not be able to travel in an airplane after 36 weeks  He may also recommend that you avoid long road trips  What are some safety tips during pregnancy? · Avoid hot tubs and saunas  Do not use a hot tub or sauna while you are pregnant, especially during your first trimester  Hot tubs and saunas may raise your baby's temperature and increase the risk of birth defects  · Avoid toxoplasmosis  This is an infection caused by eating raw meat or being around infected cat feces  It can cause birth defects, miscarriages, and other problems  Wash your hands after you touch raw meat  Make sure any meat is well-cooked before you eat it  Avoid raw eggs and unpasteurized milk  Use gloves or ask someone else to clean your cat's litter box while you are pregnant  · Ask your healthcare provider about travel  The most comfortable time to travel is during the second trimester  Ask your healthcare provider if you can travel after 36 weeks  You may not be able to travel in an airplane after 36 weeks  He may also recommend that you avoid long road trips  What changes are happening with my baby? By 38 weeks, your baby may weigh between 6 and 9 pounds  Your baby may be about 14 inches long from the top of the head to the rump (baby's bottom)  Your baby hears well enough to know your voice  As your baby gets larger, you may feel fewer kicks and more stretching and rolling  Your baby may move into a head-down position  Your baby will also rest lower in your abdomen  What do I need to know about prenatal care?   Your healthcare provider will check your blood pressure and weight  You may also need the following:  · A urine test  may also be done to check for sugar and protein  These can be signs of gestational diabetes or infection  Protein in your urine may also be a sign of preeclampsia  Preeclampsia is a condition that can develop during week 20 or later of your pregnancy  It causes high blood pressure, and it can cause problems with your kidneys and other organs  · A blood test  may be done to check for anemia (low iron level)  · A Tdap vaccine  may be recommended by your healthcare provider  · A group B strep test  is a test that is done to check for group B strep infection  Group B strep is a type of bacteria that may be found in the vagina or rectum  It can be passed to your baby during delivery if you have it  Your healthcare provider will take swab your vagina or rectum and send the sample to the lab for tests  · Fundal height  is a measurement of your uterus to check your baby's growth  This number is usually the same as the number of weeks that you have been pregnant  Your healthcare provider may also check your baby's position  · Your baby's heart rate  will be checked  When should I seek immediate care? · You develop a severe headache that does not go away  · You have new or increased vision changes, such as blurred or spotted vision  · You have new or increased swelling in your face or hands  · You have vaginal spotting or bleeding  · Your water broke or you feel warm water gushing or trickling from your vagina  When should I contact my healthcare provider? · You have more than 5 contractions in 1 hour  · You notice any changes in your baby's movements  · You have abdominal cramps, pressure, or tightening  · You have a change in vaginal discharge  · You have chills or a fever  · You have vaginal itching, burning, or pain  · You have yellow, green, white, or foul-smelling vaginal discharge      · You have pain or burning when you urinate, less urine than usual, or pink or bloody urine  · You have questions or concerns about your condition or care  CARE AGREEMENT:   You have the right to help plan your care  Learn about your health condition and how it may be treated  Discuss treatment options with your caregivers to decide what care you want to receive  You always have the right to refuse treatment  The above information is an  only  It is not intended as medical advice for individual conditions or treatments  Talk to your doctor, nurse or pharmacist before following any medical regimen to see if it is safe and effective for you  © 2017 [address] [employer] Information is for End User's use only and may not be sold, redistributed or otherwise used for commercial purposes  All illustrations and images included in CareNotes® are the copyrighted property of A D A M [employer] Inc  or Prem Burgos

## 2020-03-05 NOTE — PROGRESS NOTES
Patient denies any cramping, LOF or bleeding  GBS neg  Delivering at [facility]  She is having a girl!!! Good fetal movement  Urine neg for glucose, trace of protein

## 2020-03-06 ENCOUNTER — TELEPHONE (OUTPATIENT)
Dept: OBGYN CLINIC | Facility: CLINIC | Age: 50
End: 2020-03-06

## 2020-03-06 ENCOUNTER — ULTRASOUND (OUTPATIENT)
Dept: PERINATAL CARE | Facility: CLINIC | Age: 50
End: 2020-03-06
Payer: COMMERCIAL

## 2020-03-06 VITALS
DIASTOLIC BLOOD PRESSURE: 67 MMHG | WEIGHT: 150.4 LBS | SYSTOLIC BLOOD PRESSURE: 117 MMHG | HEIGHT: 62 IN | HEART RATE: 92 BPM | BODY MASS INDEX: 27.68 KG/M2

## 2020-03-06 DIAGNOSIS — Z3A.37 37 WEEKS GESTATION OF PREGNANCY: Primary | ICD-10-CM

## 2020-03-06 DIAGNOSIS — Z34.83 PRENATAL CARE, SUBSEQUENT PREGNANCY, THIRD TRIMESTER: ICD-10-CM

## 2020-03-06 DIAGNOSIS — O09.519 HIGH-RISK PREGNANCY, PRIMIGRAVIDA OF ADVANCED MATERNAL AGE: ICD-10-CM

## 2020-03-06 DIAGNOSIS — O24.414 INSULIN CONTROLLED GESTATIONAL DIABETES MELLITUS (GDM) IN THIRD TRIMESTER: ICD-10-CM

## 2020-03-06 DIAGNOSIS — O09.819 PREGNANCY RESULTING FROM ASSISTED REPRODUCTIVE TECHNOLOGY, ANTEPARTUM: ICD-10-CM

## 2020-03-06 PROCEDURE — 76815 OB US LIMITED FETUS(S): CPT | Performed by: OBSTETRICS & GYNECOLOGY

## 2020-03-06 PROCEDURE — 59025 FETAL NON-STRESS TEST: CPT | Performed by: OBSTETRICS & GYNECOLOGY

## 2020-03-06 NOTE — TELEPHONE ENCOUNTER
PT IS 32 WKS, SHE STATED WHILE LAYING IN BED SHE JUST RELEASED A KIKI OF WATER AND IS UNSURE IF HER WATER JUST BROKE   SHE NOT HAVING ANY CONTRACTIONS     PAGED OUT TO ON CALL DR Kadi Gusman

## 2020-03-06 NOTE — PROGRESS NOTES
Fetal ultrasound examination for evaluation of LESLEE and NST at  37 2/7 weeks gestation  Hx of    IVF, AMA, A2GDM  She is asymptomatic  See Ob procedures in EPIC  1  LESLEE  wnl 18 3  2  NST reactive      Recommendations; 1  Twice a week NSTs, once a week LESLEE  2  Daily fetal movement counts  Thank you for referring your patient to our offices  If you have any further questions do not hesitate to contact us as [phone]      Ta Schroeder MD

## 2020-03-06 NOTE — DISCHARGE INSTRUCTIONS
Early Labor Signs   WHAT YOU SHOULD KNOW:   Early labor signs are changes in your body that allow your baby to pass through your birth canal   INSTRUCTIONS:   Signs and symptoms of early labor:   · Lightening  occurs when your baby drops inside your pelvis  You may feel increased pressure in your pelvis  This may happen a few weeks to a few hours before your labor begins  · Contractions  are cramps and tightening that occur in your uterus to help move the baby through your birth canal  Contractions occur regularly and more often each time  Each one lasts about 30 to 70 seconds, and gets stronger and more painful until you deliver your baby  Contractions do not go away with movement  They start in your lower back and move to the front in your abdomen  · Effacement  occurs when your cervix softens and thins, so it can easily open for the baby  Your primary healthcare provider [facility] or obstetrician will examine your cervix for effacement  · Dilation  is widening of your cervix, also for the baby's passage  Your PHP or obstetrician will examine your cervix for dilation  Your cervix will be fully opened and ready for delivery when it is dilated to 10 centimeters  · Increased discharge  from your vagina may occur  It may be pink, clear, or slightly bloody  This discharge may also be called bloody show  Bloody show is a mucus plug that forms and blocks your cervix during pregnancy  · Rupture of membranes  is a sudden release of clear fluid from your vagina  It is also known as when your water breaks  Your PHP or obstetrician may need to break your water if it does not break on its own  False labor: You may have false labor signs, which are also called [place] Jones contractions  False labor is common and may happen several weeks or days before your actual labor  The contractions are not regular, and do not get closer together  The pain is usually mild, does not worsen, and is felt only in front   Albert Jones contractions may happen later in the day, and stop after you change position, walk, or rest   Contact your PHP or obstetrician if:   · You have pain in your lower back or abdomen  · You have bloody mucus or show  · You have questions or concerns about your condition or care  Return to the emergency department if:   · You have regular, painful contractions that are less than 5 minutes apart, and last 30 to 70 seconds each  · You have heavy vaginal bleeding  · You have a constant trickle or sudden gush of clear fluid from your vagina  · You notice a sudden decrease in your baby's movement  © 2014 [address] is for End User's use only and may not be sold, redistributed or otherwise used for commercial purposes  All illustrations and images included in CareNotes® are the copyrighted property of [employer]  or Prem Burgos  The above information is an  only  It is not intended as medical advice for individual conditions or treatments  Talk to your doctor, nurse or pharmacist before following any medical regimen to see if it is safe and effective for you  Pregnancy at 28 to [address]:   You are considered full term at the beginning of 37 weeks  Your breathing may be easier if your baby has moved down into a head-down position  You may need to urinate more often because the baby may be pressing on your bladder  You may also feel more discomfort and get tired easily  DISCHARGE INSTRUCTIONS:   Return to the emergency department if:   · You develop a severe headache that does not go away  · You have new or increased vision changes, such as blurred or spotted vision  · You have new or increased swelling in your face or hands  · You have vaginal spotting or bleeding  · Your water broke or you feel warm water gushing or trickling from your vagina    Contact your healthcare provider if:   · You have more than 5 contractions in 1 hour  · You notice any changes in your baby's movements  · You have abdominal cramps, pressure, or tightening  · You have a change in vaginal discharge  · You have chills or a fever  · You have vaginal itching, burning, or pain  · You have yellow, green, white, or foul-smelling vaginal discharge  · You have pain or burning when you urinate, less urine than usual, or pink or bloody urine  · You have questions or concerns about your condition or care  How to care for yourself at this stage of your pregnancy:   · Eat a variety of healthy foods  Healthy foods include fruits, vegetables, whole-grain breads, low-fat dairy foods, beans, lean meats, and fish  Drink liquids as directed  Ask how much liquid to drink each day and which liquids are best for you  Limit caffeine to less than 200 milligrams each day  Limit your intake of fish to 2 servings each week  Choose fish low in mercury such as canned light tuna, shrimp, crab, salmon, cod, or tilapia  Do not  eat fish high in mercury such as swordfish, tilefish, pierre mackerel, and shark  · Take prenatal vitamins as directed  Your need for certain vitamins and minerals, such as folic acid, increases during pregnancy  Prenatal vitamins provide some of the extra vitamins and minerals you need  Prenatal vitamins may also help to decrease the risk of certain birth defects  · Rest as needed  Put your feet up if you have swelling in your ankles and feet  · Do not smoke  If you smoke, it is never too late to quit  Smoking increases your risk of a miscarriage and other health problems during your pregnancy  Smoking can cause your baby to be born too early or weigh less at birth  Ask your healthcare provider for information if you need help quitting  · Do not drink alcohol  Alcohol passes from your body to your baby through the placenta   It can affect your baby's brain development and cause fetal alcohol syndrome (FAS)  FAS is a group of conditions that causes mental, behavior, and growth problems  · Talk to your healthcare provider before you take any medicines  Many medicines may harm your baby if you take them when you are pregnant  Do not take any medicines, vitamins, herbs, or supplements without first talking to your healthcare provider  Never use illegal or street drugs (such as marijuana or cocaine) while you are pregnant  · Talk to your healthcare provider before you travel  You may not be able to travel in an airplane after 36 weeks  He may also recommend that you avoid long road trips  Safety tips:   · Avoid hot tubs and saunas  Do not use a hot tub or sauna while you are pregnant, especially during your first trimester  Hot tubs and saunas may raise your baby's temperature and increase the risk of birth defects  · Avoid toxoplasmosis  This is an infection caused by eating raw meat or being around infected cat feces  It can cause birth defects, miscarriages, and other problems  Wash your hands after you touch raw meat  Make sure any meat is well-cooked before you eat it  Avoid raw eggs and unpasteurized milk  Use gloves or ask someone else to clean your cat's litter box while you are pregnant  · Ask your healthcare provider about travel  The most comfortable time to travel is during the second trimester  Ask your healthcare provider if you can travel after 36 weeks  You may not be able to travel in an airplane after 36 weeks  He may also recommend that you avoid long road trips  Changes that are happening with your baby:  By 38 weeks, your baby may weigh between 6 and 9 pounds  Your baby may be about 14 inches long from the top of the head to the rump (baby's bottom)  Your baby hears well enough to know your voice  As your baby gets larger, you may feel fewer kicks and more stretching and rolling  Your baby may move into a head-down position  Your baby will also rest lower in your abdomen  What you need to know about prenatal care: Your healthcare provider will check your blood pressure and weight  You may also need the following:  · A urine test  may also be done to check for sugar and protein  These can be signs of gestational diabetes or infection  Protein in your urine may also be a sign of preeclampsia  Preeclampsia is a condition that can develop during week 20 or later of your pregnancy  It causes high blood pressure, and it can cause problems with your kidneys and other organs  · A blood test  may be done to check for anemia (low iron level)  · A Tdap vaccine  may be recommended by your healthcare provider  · A group B strep test  is a test that is done to check for group B strep infection  Group B strep is a type of bacteria that may be found in the vagina or rectum  It can be passed to your baby during delivery if you have it  Your healthcare provider will take swab your vagina or rectum and send the sample to the lab for tests  · Fundal height  is a measurement of your uterus to check your baby's growth  This number is usually the same as the number of weeks that you have been pregnant  Your healthcare provider may also check your baby's position  · Your baby's heart rate  will be checked  © 2017 [address]  Information is for End User's use only and may not be sold, redistributed or otherwise used for commercial purposes  All illustrations and images included in CareNotes® are the copyrighted property of [employer]  or Prem Burgos  The above information is an  only  It is not intended as medical advice for individual conditions or treatments  Talk to your doctor, nurse or pharmacist before following any medical regimen to see if it is safe and effective for you

## 2020-03-06 NOTE — PROGRESS NOTES
Triage Note - OB  Celine Fulton 47 y o  female MRN: [id number]  Unit/Bed#: LD TRIAGE 1- Encounter: [id number]    OB TRIAGE NOTE  Celine Fulton  [id number]  3/5/2020  9:26 PM  LD TRIAGE 1/LD TRIAGE 1-*    ASSESS:  47 y o   37w1d with gush of clear vaginal discharge    PLAN  #1  Rule out rupture of membranes: workup negative  · No pooling noted in cervix, nitrazine negative, no ferning  · No contractions noted on monitor, cervical exam closed/high/firm  · LVP 5 8 cm  · Discussed early signs of labor with patient and advised her to follow up at there regularly scheduled appointments      #2  Discharge instructions  · Patient instructed to call if experiencing worsening contractions, vaginal bleeding, loss of fluid or decreased fetal movement  · Will follow up with OBGYN on 3/11/2020  D/w Dr Romeo Vogel  ______________    SUBJECTIVE    JOSE: Estimated Date of Delivery: 3/25/20    HPI Chronology:  47 y o  Magdaleno Dobbins 37w1d presents with complaint of clear gush of vaginal fluid at 1800 today  Patients states that she was sitting at a table at home when she felt 2 large gushes of fluid  She states that fluid was sweet smelling, clear, nonbloody  She denies change in fetal movement, contractions, vaginal bleeding  Her pregnancy has been complicated thus far by insulin controlled A2GDM, IVF, and AMA  Patient has close follow up with her OBGYN and the  center  Contractions: no  Leakage: yes  Bleeding: no  Fetal Movement: yes  Pelvic pain: no    Vitals:   /70 (BP Location: Left arm)   Pulse 81   Temp 97 7 °F (36 5 °C) (Temporal)   Resp 16   LMP 05/10/2019 (Exact Date)   There is no height or weight on file to calculate BMI  Review of Systems   Respiratory: Negative  Cardiovascular: Negative  Gastrointestinal: Negative  Genitourinary: Positive for vaginal discharge  Negative for decreased urine volume, dysuria, urgency, vaginal bleeding and vaginal pain         Physical Exam Cardiovascular: Normal rate and regular rhythm  Pulmonary/Chest: Effort normal and breath sounds normal    Abdominal: Soft  Vitals reviewed  SSE: thick white discharge noted   Negative pooling, ferning, nitrazine testing, neg for hyphae on KOH    FHT:  Category 1  TOCO:   No contractions    Labs:   Recent Results (from the past 24 hour(s))   POCT urine dip    Collection Time: 03/05/20  2:25 PM   Result Value Ref Range    POCT URINE PROTEIN trace     GLUCOSE, UA neg        Lab, Imaging and other studies: I have personally reviewed pertinent reports        Harjeet Sweet MD  3/5/2020  9:26 PM

## 2020-03-07 LAB
C TRACH RRNA SPEC QL NAA+PROBE: NEGATIVE
N GONORRHOEA RRNA SPEC QL NAA+PROBE: NEGATIVE

## 2020-03-10 ENCOUNTER — ROUTINE PRENATAL (OUTPATIENT)
Dept: PERINATAL CARE | Facility: CLINIC | Age: 50
End: 2020-03-10
Payer: COMMERCIAL

## 2020-03-10 ENCOUNTER — TELEPHONE (OUTPATIENT)
Dept: OBGYN CLINIC | Facility: MEDICAL CENTER | Age: 50
End: 2020-03-10

## 2020-03-10 VITALS
WEIGHT: 151.4 LBS | BODY MASS INDEX: 27.86 KG/M2 | HEIGHT: 62 IN | HEART RATE: 90 BPM | SYSTOLIC BLOOD PRESSURE: 127 MMHG | DIASTOLIC BLOOD PRESSURE: 74 MMHG

## 2020-03-10 DIAGNOSIS — O24.414 INSULIN CONTROLLED GESTATIONAL DIABETES MELLITUS (GDM) IN THIRD TRIMESTER: ICD-10-CM

## 2020-03-10 DIAGNOSIS — Z3A.36 36 WEEKS GESTATION OF PREGNANCY: ICD-10-CM

## 2020-03-10 DIAGNOSIS — Z3A.37 37 WEEKS GESTATION OF PREGNANCY: Primary | ICD-10-CM

## 2020-03-10 DIAGNOSIS — O09.819 PREGNANCY RESULTING FROM ASSISTED REPRODUCTIVE TECHNOLOGY, ANTEPARTUM: ICD-10-CM

## 2020-03-10 PROCEDURE — 59025 FETAL NON-STRESS TEST: CPT | Performed by: OBSTETRICS & GYNECOLOGY

## 2020-03-11 ENCOUNTER — TELEPHONE (OUTPATIENT)
Dept: OBGYN CLINIC | Facility: CLINIC | Age: 50
End: 2020-03-11

## 2020-03-12 ENCOUNTER — ROUTINE PRENATAL (OUTPATIENT)
Dept: OBGYN CLINIC | Facility: CLINIC | Age: 50
End: 2020-03-12

## 2020-03-12 ENCOUNTER — ULTRASOUND (OUTPATIENT)
Dept: PERINATAL CARE | Facility: CLINIC | Age: 50
End: 2020-03-12
Payer: COMMERCIAL

## 2020-03-12 VITALS — WEIGHT: 152.6 LBS | DIASTOLIC BLOOD PRESSURE: 72 MMHG | SYSTOLIC BLOOD PRESSURE: 104 MMHG | BODY MASS INDEX: 27.91 KG/M2

## 2020-03-12 VITALS
SYSTOLIC BLOOD PRESSURE: 124 MMHG | HEIGHT: 62 IN | BODY MASS INDEX: 27.97 KG/M2 | WEIGHT: 152 LBS | HEART RATE: 84 BPM | DIASTOLIC BLOOD PRESSURE: 68 MMHG

## 2020-03-12 DIAGNOSIS — Z34.83 PRENATAL CARE, SUBSEQUENT PREGNANCY, THIRD TRIMESTER: Primary | ICD-10-CM

## 2020-03-12 DIAGNOSIS — O09.819 PREGNANCY RESULTING FROM ASSISTED REPRODUCTIVE TECHNOLOGY, ANTEPARTUM: ICD-10-CM

## 2020-03-12 DIAGNOSIS — O24.414 INSULIN CONTROLLED GESTATIONAL DIABETES MELLITUS (GDM) IN THIRD TRIMESTER: Primary | ICD-10-CM

## 2020-03-12 DIAGNOSIS — Z3A.38 38 WEEKS GESTATION OF PREGNANCY: ICD-10-CM

## 2020-03-12 DIAGNOSIS — O09.519 HIGH-RISK PREGNANCY, PRIMIGRAVIDA OF ADVANCED MATERNAL AGE: ICD-10-CM

## 2020-03-12 DIAGNOSIS — O24.414 INSULIN CONTROLLED GESTATIONAL DIABETES MELLITUS (GDM) IN THIRD TRIMESTER: ICD-10-CM

## 2020-03-12 LAB
SL AMB  POCT GLUCOSE, UA: NORMAL
SL AMB POCT URINE PROTEIN: NORMAL

## 2020-03-12 PROCEDURE — PNV: Performed by: NURSE PRACTITIONER

## 2020-03-12 PROCEDURE — 76815 OB US LIMITED FETUS(S): CPT | Performed by: OBSTETRICS & GYNECOLOGY

## 2020-03-12 PROCEDURE — 59025 FETAL NON-STRESS TEST: CPT | Performed by: OBSTETRICS & GYNECOLOGY

## 2020-03-12 NOTE — PROGRESS NOTES
Daily fetal kick count reviewed and emphasized  Patient verbalized understanding of all and was receptive      Mj Rosa RN

## 2020-03-12 NOTE — ASSESSMENT & PLAN NOTE
Lab Results   Component Value Date    HGBA1C 4 4 10/23/2019   MFM for NST and LESLEE 2x week  3/6 NST was reactive and LESLEE of 18   6  Had NST 3/10-results not available in Epic  Continue twice weekly NST/LESLEE  Being followed by diabetic pathways  Sugars well controlled on insulin

## 2020-03-12 NOTE — LETTER
March 12, 2020     MD David Verma [id number]  [address]    Patient: Dulce Maria Campos   YOB: 1970   Date of Visit: 3/12/2020       Dear Dr Zayda Pitt: Thank you for referring Dulce Maria Campos to me for evaluation  Below are my notes for this consultation  If you have questions, please do not hesitate to call me  I look forward to following your patient along with you  Sincerely,        Fabricio Ramos MD        CC: No Recipients  Fabricio Ramos MD  3/12/2020  7:43 PM  Sign at close encounter  NST is reactive    Fabricio BARBOUR

## 2020-03-12 NOTE — ASSESSMENT & PLAN NOTE
Elle Abel is a 47year old  who presents for routine prenatal visit  38w1d  Denies LOF, vaginal bleeding, regular uterine contractions, cramping, headaches or visual changes  Reports good fetal movement  Taking PNV daily as prescribed  Flu and TDap up to date  Reviewed labor precautions and FKC  GBS negative  Delivering @ [facility]  Having a girl  IOLscheduled for 3/18/20 with cervical ripening on 3/17  She is concerned with epidural as she has a friend who recently lost a baby due to bradycardia after epidural   Discussed her concerns  Advised to discuss with on call doctor      Plans to breastfeed   Has last few weeks of pregnancy paper & Perineal massage

## 2020-03-12 NOTE — PROGRESS NOTES
Patient states she is having a lot of discharge, but no cramping or bleeding  Urine neg for protein and glucose

## 2020-03-12 NOTE — PROGRESS NOTES
Prenatal care, subsequent pregnancy, third trimester  Jeffy Mortensen is a 47year old  who presents for routine prenatal visit  38w1d  Denies LOF, vaginal bleeding, regular uterine contractions, cramping, headaches or visual changes  Reports good fetal movement  Taking PNV daily as prescribed  Flu and TDap up to date  Reviewed labor precautions and FKC  GBS negative  Delivering @ [facility]  Having a girl  IOLscheduled for 3/18/20 with cervical ripening on 3/17  She is concerned with epidural as she has a friend who recently lost a baby due to bradycardia after epidural   Discussed her concerns  Advised to discuss with on call doctor  Plans to breastfeed   Has last few weeks of pregnancy paper & Perineal massage    Insulin controlled gestational diabetes mellitus (GDM) in third trimester    Lab Results   Component Value Date    HGBA1C 4 4 10/23/2019   MFM for NST and LESLEE 2x week  3/6 NST was reactive and LESLEE of 18   6  Had NST 3/10-results not available in Epic  Continue twice weekly NST/LESLEE  Being followed by diabetic pathways  Sugars well controlled on insulin  High-risk pregnancy, primigravida of advanced maternal age  IOL scheduled for 3/18 with cervical ripening on 3/17    Continue baby ASA

## 2020-03-16 ENCOUNTER — TELEPHONE (OUTPATIENT)
Dept: PERINATAL CARE | Facility: CLINIC | Age: 50
End: 2020-03-16

## 2020-03-17 ENCOUNTER — HOSPITAL ENCOUNTER (INPATIENT)
Facility: HOSPITAL | Age: 50
LOS: 4 days | Discharge: HOME | End: 2020-03-21
Attending: STUDENT IN AN ORGANIZED HEALTH CARE EDUCATION/TRAINING PROGRAM | Admitting: STUDENT IN AN ORGANIZED HEALTH CARE EDUCATION/TRAINING PROGRAM
Payer: COMMERCIAL

## 2020-03-17 ENCOUNTER — HOSPITAL ENCOUNTER (OUTPATIENT)
Dept: LABOR AND DELIVERY | Facility: HOSPITAL | Age: 50
Discharge: HOME | End: 2020-03-17
Payer: COMMERCIAL

## 2020-03-17 ENCOUNTER — ROUTINE PRENATAL (OUTPATIENT)
Dept: PERINATAL CARE | Facility: CLINIC | Age: 50
End: 2020-03-17
Payer: COMMERCIAL

## 2020-03-17 VITALS
SYSTOLIC BLOOD PRESSURE: 127 MMHG | BODY MASS INDEX: 28.3 KG/M2 | WEIGHT: 153.8 LBS | DIASTOLIC BLOOD PRESSURE: 75 MMHG | HEIGHT: 62 IN | HEART RATE: 89 BPM

## 2020-03-17 DIAGNOSIS — O09.519 HIGH-RISK PREGNANCY, PRIMIGRAVIDA OF ADVANCED MATERNAL AGE: ICD-10-CM

## 2020-03-17 DIAGNOSIS — Z3A.38 38 WEEKS GESTATION OF PREGNANCY: ICD-10-CM

## 2020-03-17 DIAGNOSIS — O24.414 INSULIN CONTROLLED GESTATIONAL DIABETES MELLITUS (GDM) IN THIRD TRIMESTER: ICD-10-CM

## 2020-03-17 DIAGNOSIS — Z98.891 S/P CESAREAN SECTION: Primary | ICD-10-CM

## 2020-03-17 DIAGNOSIS — Z3A.38 38 WEEKS GESTATION OF PREGNANCY: Primary | ICD-10-CM

## 2020-03-17 LAB
ABO GROUP BLD: NORMAL
BLD GP AB SCN SERPL QL: NEGATIVE
ERYTHROCYTE [DISTWIDTH] IN BLOOD BY AUTOMATED COUNT: 17.6 % (ref 11.6–15.1)
GLUCOSE SERPL-MCNC: 69 MG/DL (ref 65–140)
GLUCOSE SERPL-MCNC: 72 MG/DL (ref 65–140)
GLUCOSE SERPL-MCNC: 85 MG/DL (ref 65–140)
HCT VFR BLD AUTO: 34.4 % (ref 34.8–46.1)
HGB BLD-MCNC: 10.7 G/DL (ref 11.5–15.4)
MCH RBC QN AUTO: 22.2 PG (ref 26.8–34.3)
MCHC RBC AUTO-ENTMCNC: 31.1 G/DL (ref 31.4–37.4)
MCV RBC AUTO: 71 FL (ref 82–98)
PLATELET # BLD AUTO: 175 THOUSANDS/UL (ref 149–390)
RBC # BLD AUTO: 4.83 MILLION/UL (ref 3.81–5.12)
RH BLD: POSITIVE
SPECIMEN EXPIRATION DATE: NORMAL
WBC # BLD AUTO: 10.42 THOUSAND/UL (ref 4.31–10.16)

## 2020-03-17 PROCEDURE — 10907ZC DRAINAGE OF AMNIOTIC FLUID, THERAPEUTIC FROM PRODUCTS OF CONCEPTION, VIA NATURAL OR ARTIFICIAL OPENING: ICD-10-PCS | Performed by: OBSTETRICS & GYNECOLOGY

## 2020-03-17 PROCEDURE — 3E0P7VZ INTRODUCTION OF HORMONE INTO FEMALE REPRODUCTIVE, VIA NATURAL OR ARTIFICIAL OPENING: ICD-10-PCS | Performed by: OBSTETRICS & GYNECOLOGY

## 2020-03-17 PROCEDURE — 86850 RBC ANTIBODY SCREEN: CPT | Performed by: OBSTETRICS & GYNECOLOGY

## 2020-03-17 PROCEDURE — 86900 BLOOD TYPING SEROLOGIC ABO: CPT | Performed by: OBSTETRICS & GYNECOLOGY

## 2020-03-17 PROCEDURE — 86592 SYPHILIS TEST NON-TREP QUAL: CPT | Performed by: OBSTETRICS & GYNECOLOGY

## 2020-03-17 PROCEDURE — 86901 BLOOD TYPING SEROLOGIC RH(D): CPT | Performed by: OBSTETRICS & GYNECOLOGY

## 2020-03-17 PROCEDURE — 4A1HXCZ MONITORING OF PRODUCTS OF CONCEPTION, CARDIAC RATE, EXTERNAL APPROACH: ICD-10-PCS | Performed by: OBSTETRICS & GYNECOLOGY

## 2020-03-17 PROCEDURE — 85027 COMPLETE CBC AUTOMATED: CPT | Performed by: OBSTETRICS & GYNECOLOGY

## 2020-03-17 PROCEDURE — 82948 REAGENT STRIP/BLOOD GLUCOSE: CPT

## 2020-03-17 PROCEDURE — 59025 FETAL NON-STRESS TEST: CPT | Performed by: OBSTETRICS & GYNECOLOGY

## 2020-03-17 PROCEDURE — 99024 POSTOP FOLLOW-UP VISIT: CPT | Performed by: STUDENT IN AN ORGANIZED HEALTH CARE EDUCATION/TRAINING PROGRAM

## 2020-03-17 RX ORDER — ONDANSETRON 2 MG/ML
4 INJECTION INTRAMUSCULAR; INTRAVENOUS EVERY 6 HOURS PRN
Status: DISCONTINUED | OUTPATIENT
Start: 2020-03-17 | End: 2020-03-18

## 2020-03-17 RX ORDER — SODIUM CHLORIDE 9 MG/ML
125 INJECTION, SOLUTION INTRAVENOUS CONTINUOUS
Status: DISCONTINUED | OUTPATIENT
Start: 2020-03-17 | End: 2020-03-21

## 2020-03-17 RX ORDER — SODIUM CHLORIDE, SODIUM LACTATE, POTASSIUM CHLORIDE, CALCIUM CHLORIDE 600; 310; 30; 20 MG/100ML; MG/100ML; MG/100ML; MG/100ML
125 INJECTION, SOLUTION INTRAVENOUS CONTINUOUS
Status: DISCONTINUED | OUTPATIENT
Start: 2020-03-17 | End: 2020-03-17

## 2020-03-17 RX ADMIN — MISOPROSTOL 25 MCG: 100 TABLET ORAL at 22:16

## 2020-03-17 RX ADMIN — SODIUM CHLORIDE 125 ML/HR: 0.9 INJECTION, SOLUTION INTRAVENOUS at 21:05

## 2020-03-18 ENCOUNTER — ANESTHESIA (INPATIENT)
Dept: LABOR AND DELIVERY | Facility: HOSPITAL | Age: 50
End: 2020-03-18
Payer: COMMERCIAL

## 2020-03-18 ENCOUNTER — ANESTHESIA EVENT (INPATIENT)
Dept: LABOR AND DELIVERY | Facility: HOSPITAL | Age: 50
End: 2020-03-18
Payer: COMMERCIAL

## 2020-03-18 LAB
ABO GROUP BLD: NORMAL
BASE EXCESS BLDCOA CALC-SCNC: -10.1 MMOL/L (ref 3–11)
BASE EXCESS BLDCOV CALC-SCNC: -9.1 MMOL/L (ref 1–9)
GLUCOSE SERPL-MCNC: 69 MG/DL (ref 65–140)
GLUCOSE SERPL-MCNC: 71 MG/DL (ref 65–140)
GLUCOSE SERPL-MCNC: 75 MG/DL (ref 65–140)
GLUCOSE SERPL-MCNC: 86 MG/DL (ref 65–140)
HBV SURFACE AG SER QL: NORMAL
HCO3 BLDCOA-SCNC: 20.7 MMOL/L (ref 17.3–27.3)
HCO3 BLDCOV-SCNC: 22.3 MMOL/L (ref 12.2–28.6)
HIV 1+2 AB+HIV1 P24 AG SERPL QL IA: NORMAL
HIV1 P24 AG SER QL: NORMAL
OXYHGB MFR BLDCOA: 7.4 %
OXYHGB MFR BLDCOV: 14.7 %
PCO2 BLDCOA: 66.7 MM[HG] (ref 30–60)
PCO2 BLDCOV: 74 MM HG (ref 27–43)
PH BLDCOA: 7.11 [PH] (ref 7.23–7.43)
PH BLDCOV: 7.1 [PH] (ref 7.19–7.49)
PO2 BLDCOA: <10 MM HG (ref 5–25)
PO2 BLDCOV: 10.9 MM HG (ref 15–45)
RH BLD: POSITIVE
RPR SER QL: NORMAL
RUBV IGG SERPL IA-ACNC: >175 IU/ML
SAO2 % BLDCOV: 3.3 ML/DL

## 2020-03-18 PROCEDURE — 87340 HEPATITIS B SURFACE AG IA: CPT | Performed by: OBSTETRICS & GYNECOLOGY

## 2020-03-18 PROCEDURE — 82805 BLOOD GASES W/O2 SATURATION: CPT | Performed by: OBSTETRICS & GYNECOLOGY

## 2020-03-18 PROCEDURE — 59514 CESAREAN DELIVERY ONLY: CPT | Performed by: OBSTETRICS & GYNECOLOGY

## 2020-03-18 PROCEDURE — 99024 POSTOP FOLLOW-UP VISIT: CPT | Performed by: OBSTETRICS & GYNECOLOGY

## 2020-03-18 PROCEDURE — 88307 TISSUE EXAM BY PATHOLOGIST: CPT | Performed by: PATHOLOGY

## 2020-03-18 PROCEDURE — 87806 HIV AG W/HIV1&2 ANTB W/OPTIC: CPT | Performed by: OBSTETRICS & GYNECOLOGY

## 2020-03-18 PROCEDURE — 82948 REAGENT STRIP/BLOOD GLUCOSE: CPT

## 2020-03-18 PROCEDURE — 86762 RUBELLA ANTIBODY: CPT | Performed by: OBSTETRICS & GYNECOLOGY

## 2020-03-18 PROCEDURE — 59510 CESAREAN DELIVERY: CPT | Performed by: OBSTETRICS & GYNECOLOGY

## 2020-03-18 RX ORDER — BUTORPHANOL TARTRATE 1 MG/ML
1 INJECTION, SOLUTION INTRAMUSCULAR; INTRAVENOUS ONCE
Status: COMPLETED | OUTPATIENT
Start: 2020-03-18 | End: 2020-03-18

## 2020-03-18 RX ORDER — OXYTOCIN/RINGER'S LACTATE 30/500 ML
PLASTIC BAG, INJECTION (ML) INTRAVENOUS CONTINUOUS PRN
Status: DISCONTINUED | OUTPATIENT
Start: 2020-03-18 | End: 2020-03-18 | Stop reason: SURG

## 2020-03-18 RX ORDER — METOCLOPRAMIDE HYDROCHLORIDE 5 MG/ML
10 INJECTION INTRAMUSCULAR; INTRAVENOUS EVERY 6 HOURS PRN
Status: DISCONTINUED | OUTPATIENT
Start: 2020-03-18 | End: 2020-03-21 | Stop reason: HOSPADM

## 2020-03-18 RX ORDER — IBUPROFEN 600 MG/1
600 TABLET ORAL EVERY 6 HOURS PRN
Status: DISCONTINUED | OUTPATIENT
Start: 2020-03-18 | End: 2020-03-18

## 2020-03-18 RX ORDER — FENTANYL CITRATE 50 UG/ML
INJECTION, SOLUTION INTRAMUSCULAR; INTRAVENOUS AS NEEDED
Status: DISCONTINUED | OUTPATIENT
Start: 2020-03-18 | End: 2020-03-18 | Stop reason: SURG

## 2020-03-18 RX ORDER — BUPIVACAINE HYDROCHLORIDE 7.5 MG/ML
INJECTION, SOLUTION INTRASPINAL AS NEEDED
Status: DISCONTINUED | OUTPATIENT
Start: 2020-03-18 | End: 2020-03-18 | Stop reason: SURG

## 2020-03-18 RX ORDER — ONDANSETRON 2 MG/ML
4 INJECTION INTRAMUSCULAR; INTRAVENOUS EVERY 8 HOURS PRN
Status: DISCONTINUED | OUTPATIENT
Start: 2020-03-18 | End: 2020-03-18

## 2020-03-18 RX ORDER — ACETAMINOPHEN 325 MG/1
650 TABLET ORAL EVERY 4 HOURS PRN
Status: DISCONTINUED | OUTPATIENT
Start: 2020-03-18 | End: 2020-03-21 | Stop reason: HOSPADM

## 2020-03-18 RX ORDER — DIPHENHYDRAMINE HCL 25 MG
25 TABLET ORAL EVERY 6 HOURS PRN
Status: DISCONTINUED | OUTPATIENT
Start: 2020-03-18 | End: 2020-03-21 | Stop reason: HOSPADM

## 2020-03-18 RX ORDER — IBUPROFEN 600 MG/1
600 TABLET ORAL EVERY 6 HOURS PRN
Status: DISPENSED | OUTPATIENT
Start: 2020-03-18 | End: 2020-03-19

## 2020-03-18 RX ORDER — PROMETHAZINE HYDROCHLORIDE 25 MG/ML
12.5 INJECTION, SOLUTION INTRAMUSCULAR; INTRAVENOUS ONCE
Status: COMPLETED | OUTPATIENT
Start: 2020-03-18 | End: 2020-03-18

## 2020-03-18 RX ORDER — CEFAZOLIN SODIUM 2 G/50ML
SOLUTION INTRAVENOUS AS NEEDED
Status: DISCONTINUED | OUTPATIENT
Start: 2020-03-18 | End: 2020-03-18 | Stop reason: SURG

## 2020-03-18 RX ORDER — OXYCODONE HYDROCHLORIDE AND ACETAMINOPHEN 5; 325 MG/1; MG/1
2 TABLET ORAL EVERY 4 HOURS PRN
Status: DISCONTINUED | OUTPATIENT
Start: 2020-03-19 | End: 2020-03-20

## 2020-03-18 RX ORDER — DOCUSATE SODIUM 100 MG/1
100 CAPSULE, LIQUID FILLED ORAL 2 TIMES DAILY
Status: DISCONTINUED | OUTPATIENT
Start: 2020-03-18 | End: 2020-03-21 | Stop reason: HOSPADM

## 2020-03-18 RX ORDER — FAMOTIDINE 20 MG/1
10 TABLET, FILM COATED ORAL 2 TIMES DAILY
Status: DISCONTINUED | OUTPATIENT
Start: 2020-03-18 | End: 2020-03-21 | Stop reason: HOSPADM

## 2020-03-18 RX ORDER — MORPHINE SULFATE 10 MG/ML
10 INJECTION, SOLUTION INTRAMUSCULAR; INTRAVENOUS ONCE
Status: COMPLETED | OUTPATIENT
Start: 2020-03-18 | End: 2020-03-18

## 2020-03-18 RX ORDER — ONDANSETRON 2 MG/ML
INJECTION INTRAMUSCULAR; INTRAVENOUS AS NEEDED
Status: DISCONTINUED | OUTPATIENT
Start: 2020-03-18 | End: 2020-03-18 | Stop reason: SURG

## 2020-03-18 RX ORDER — OXYCODONE HYDROCHLORIDE AND ACETAMINOPHEN 5; 325 MG/1; MG/1
1 TABLET ORAL EVERY 4 HOURS PRN
Status: DISCONTINUED | OUTPATIENT
Start: 2020-03-19 | End: 2020-03-20

## 2020-03-18 RX ORDER — LIDOCAINE HYDROCHLORIDE 10 MG/ML
INJECTION, SOLUTION EPIDURAL; INFILTRATION; INTRACAUDAL; PERINEURAL AS NEEDED
Status: DISCONTINUED | OUTPATIENT
Start: 2020-03-18 | End: 2020-03-18 | Stop reason: SURG

## 2020-03-18 RX ORDER — CEFAZOLIN SODIUM 2 G/50ML
2000 SOLUTION INTRAVENOUS ONCE
Status: DISCONTINUED | OUTPATIENT
Start: 2020-03-18 | End: 2020-03-18

## 2020-03-18 RX ORDER — MORPHINE SULFATE 0.5 MG/ML
INJECTION, SOLUTION EPIDURAL; INTRATHECAL; INTRAVENOUS AS NEEDED
Status: DISCONTINUED | OUTPATIENT
Start: 2020-03-18 | End: 2020-03-18 | Stop reason: SURG

## 2020-03-18 RX ORDER — KETOROLAC TROMETHAMINE 30 MG/ML
INJECTION, SOLUTION INTRAMUSCULAR; INTRAVENOUS AS NEEDED
Status: DISCONTINUED | OUTPATIENT
Start: 2020-03-18 | End: 2020-03-18 | Stop reason: SURG

## 2020-03-18 RX ORDER — ONDANSETRON 2 MG/ML
4 INJECTION INTRAMUSCULAR; INTRAVENOUS EVERY 6 HOURS PRN
Status: DISCONTINUED | OUTPATIENT
Start: 2020-03-18 | End: 2020-03-21 | Stop reason: HOSPADM

## 2020-03-18 RX ORDER — IBUPROFEN 600 MG/1
600 TABLET ORAL EVERY 6 HOURS PRN
Status: DISPENSED | OUTPATIENT
Start: 2020-03-19 | End: 2020-03-20

## 2020-03-18 RX ADMIN — SODIUM CHLORIDE 125 ML/HR: 0.9 INJECTION, SOLUTION INTRAVENOUS at 22:18

## 2020-03-18 RX ADMIN — ONDANSETRON 4 MG: 2 INJECTION INTRAMUSCULAR; INTRAVENOUS at 08:57

## 2020-03-18 RX ADMIN — MISOPROSTOL 25 MCG: 100 TABLET ORAL at 02:54

## 2020-03-18 RX ADMIN — KETOROLAC TROMETHAMINE 15 MG: 30 INJECTION, SOLUTION INTRAMUSCULAR at 08:58

## 2020-03-18 RX ADMIN — LIDOCAINE HYDROCHLORIDE 3 ML: 10 INJECTION, SOLUTION EPIDURAL; INFILTRATION; INTRACAUDAL; PERINEURAL at 08:14

## 2020-03-18 RX ADMIN — BUTORPHANOL TARTRATE 1 MG: 1 INJECTION, SOLUTION INTRAMUSCULAR; INTRAVENOUS at 04:40

## 2020-03-18 RX ADMIN — SODIUM CHLORIDE 125 ML/HR: 0.9 INJECTION, SOLUTION INTRAVENOUS at 15:18

## 2020-03-18 RX ADMIN — MORPHINE SULFATE 10 MG: 10 INJECTION, SOLUTION INTRAMUSCULAR; INTRAVENOUS at 07:06

## 2020-03-18 RX ADMIN — SODIUM CHLORIDE 125 ML/HR: 0.9 INJECTION, SOLUTION INTRAVENOUS at 00:43

## 2020-03-18 RX ADMIN — DOCUSATE SODIUM 100 MG: 100 CAPSULE, LIQUID FILLED ORAL at 17:58

## 2020-03-18 RX ADMIN — FAMOTIDINE 10 MG: 20 TABLET ORAL at 19:58

## 2020-03-18 RX ADMIN — Medication 250 MILLI-UNITS/MIN: at 08:29

## 2020-03-18 RX ADMIN — MORPHINE SULFATE 0.15 MG: 0.5 INJECTION, SOLUTION EPIDURAL; INTRATHECAL; INTRAVENOUS at 08:16

## 2020-03-18 RX ADMIN — FENTANYL CITRATE 15 MCG: 50 INJECTION INTRAMUSCULAR; INTRAVENOUS at 08:16

## 2020-03-18 RX ADMIN — CEFAZOLIN SODIUM 2000 MG: 2 SOLUTION INTRAVENOUS at 08:11

## 2020-03-18 RX ADMIN — METOCLOPRAMIDE 10 MG: 5 INJECTION, SOLUTION INTRAMUSCULAR; INTRAVENOUS at 15:17

## 2020-03-18 RX ADMIN — SODIUM CHLORIDE: 0.9 INJECTION, SOLUTION INTRAVENOUS at 08:59

## 2020-03-18 RX ADMIN — PROMETHAZINE HYDROCHLORIDE 12.5 MG: 25 INJECTION INTRAMUSCULAR; INTRAVENOUS at 07:06

## 2020-03-18 RX ADMIN — IBUPROFEN 600 MG: 600 TABLET ORAL at 16:09

## 2020-03-18 RX ADMIN — IBUPROFEN 600 MG: 600 TABLET ORAL at 23:20

## 2020-03-18 RX ADMIN — ONDANSETRON 4 MG: 2 INJECTION INTRAMUSCULAR; INTRAVENOUS at 05:41

## 2020-03-18 RX ADMIN — Medication 500 MG: at 08:19

## 2020-03-18 RX ADMIN — ONDANSETRON 4 MG: 2 INJECTION INTRAMUSCULAR; INTRAVENOUS at 13:43

## 2020-03-18 RX ADMIN — PHENYLEPHRINE HYDROCHLORIDE 40 MCG/MIN: 10 INJECTION INTRAVENOUS at 08:17

## 2020-03-18 RX ADMIN — SODIUM CHLORIDE 125 ML/HR: 0.9 INJECTION, SOLUTION INTRAVENOUS at 05:55

## 2020-03-18 RX ADMIN — BUPIVACAINE HYDROCHLORIDE IN DEXTROSE 1.5 ML: 7.5 INJECTION, SOLUTION SUBARACHNOID at 08:16

## 2020-03-18 NOTE — PROGRESS NOTES
OB Pre-Op Note     I saw patient and discussed plan for   Reviewed FHR tracing and agree with plan of care  Patient aware of risks of procedure including bleeding, infection, transfusion, VTE, and trauma to surrounding organs      Giuliana Mcgee MD

## 2020-03-18 NOTE — PLAN OF CARE
Problem: BIRTH - VAGINAL/ SECTION  Goal: Fetal and maternal status remain reassuring during the birth process  Description  INTERVENTIONS:  - Monitor vital signs  - Monitor fetal heart rate  - Monitor uterine activity  - Monitor labor progression (vaginal delivery)  - DVT prophylaxis  - Antibiotic prophylaxis  Outcome: Progressing  Goal: Emotionally satisfying birthing experience for mother/fetus  Description  Interventions:  - Assess, plan, implement and evaluate the nursing care given to the patient in labor  - Advocate the philosophy that each childbirth experience is a unique experience and support the family's chosen level of involvement and control during the labor process   - Actively participate in both the patient's and family's teaching of the birth process  - Consider cultural, Ep[employer] and age-specific factors and plan care for the patient in labor  Outcome: Progressing     Problem: PAIN - ADULT  Goal: Verbalizes/displays adequate comfort level or baseline comfort level  Description  Interventions:  - Encourage patient to monitor pain and request assistance  - Assess pain using appropriate pain scale  - Administer analgesics based on type and severity of pain and evaluate response  - Implement non-pharmacological measures as appropriate and evaluate response  - Consider cultural and social influences on pain and pain management  - Notify physician/advanced practitioner if interventions unsuccessful or patient reports new pain  Outcome: Progressing     Problem: INFECTION - ADULT  Goal: Absence or prevention of progression during hospitalization  Description  INTERVENTIONS:  - Assess and monitor for signs and symptoms of infection  - Monitor lab/diagnostic results  - Monitor all insertion sites, i e  indwelling lines, tubes, and drains  - Monitor endotracheal if appropriate and nasal secretions for changes in amount and color  - [place] appropriate cooling/warming therapies per order  - Administer medications as ordered  - Instruct and encourage patient and family to use good hand hygiene technique  - Identify and instruct in appropriate isolation precautions for identified infection/condition  Outcome: Progressing     Problem: SAFETY ADULT  Goal: Patient will remain free of falls  Description  INTERVENTIONS:  - Assess patient frequently for physical needs  -  Identify cognitive and physical deficits and behaviors that affect risk of falls    -  [place] fall precautions as indicated by assessment   - Educate patient/family on patient safety including physical limitations  - Instruct patient to call for assistance with activity based on assessment  - Modify environment to reduce risk of injury  - Consider OT/PT consult to assist with strengthening/mobility  Outcome: Progressing  Goal: Maintain or return to baseline ADL function  Description  INTERVENTIONS:  -  Assess patient's ability to carry out ADLs; assess patient's baseline for ADL function and identify physical deficits which impact ability to perform ADLs (bathing, care of mouth/teeth, toileting, grooming, dressing, etc )  - Assess/evaluate cause of self-care deficits   - Assess range of motion  - Assess patient's mobility; develop plan if impaired  - Assess patient's need for assistive devices and provide as appropriate  - Encourage maximum independence but intervene and supervise when necessary  - Involve family in performance of ADLs  - Assess for home care needs following discharge   - Consider OT consult to assist with ADL evaluation and planning for discharge  - Provide patient education as appropriate  Outcome: Progressing  Goal: Maintain or return mobility status to optimal level  Description  INTERVENTIONS:  - Assess patient's baseline mobility status (ambulation, transfers, stairs, etc )    - Identify cognitive and physical deficits and behaviors that affect mobility  - Identify mobility aids required to assist with transfers and/or ambulation (gait belt, sit-to-stand, lift, walker, cane, etc )  - [place] fall precautions as indicated by assessment  - Record patient progress and toleration of activity level on Mobility SBAR; progress patient to next Phase/Stage  - Instruct patient to call for assistance with activity based on assessment  - Consider rehabilitation consult to assist with strengthening/weightbearing, etc   Outcome: Progressing     Problem: Knowledge Deficit  Goal: Patient/family/caregiver demonstrates understanding of disease process, treatment plan, medications, and discharge instructions  Description  Complete learning assessment and assess knowledge base  Interventions:  - Provide teaching at level of understanding  - Provide teaching via preferred learning methods  Outcome: Progressing  Goal: Verbalizes understanding of labor plan  Description  Assess patient/family/caregiver's baseline knowledge level and ability to understand information  Provide education via patient/family/caregiver's preferred learning method at appropriate level of understanding  1  Provide teaching at level of understanding  2  Provide teaching via preferred learning method(s)    Outcome: Progressing     Problem: DISCHARGE PLANNING  Goal: Discharge to home or other facility with appropriate resources  Description  INTERVENTIONS:  - Identify barriers to discharge w/patient and caregiver  - Arrange for needed discharge resources and transportation as appropriate  - Identify discharge learning needs (meds, wound care, etc )  - Arrange for interpretive services to assist at discharge as needed  - Refer to Case Management Department for coordinating discharge planning if the patient needs post-hospital services based on physician/advanced practitioner order or complex needs related to functional status, cognitive ability, or social support system  Outcome: Progressing     Problem: Labor & Delivery  Goal: Manages discomfort  Description  Assess and monitor for signs and symptoms of discomfort  Assess patient's pain level regularly and per hospital policy  Administer medications as ordered  Support use of nonpharmacological methods to help control pain such as distraction, imagery, relaxation, and application of heat and cold  Collaborate with interdisciplinary team and patient to determine appropriate pain management plan  1  Include patient in decisions related to comfort  2  Offer non-pharmacological pain management interventions  3  Report ineffective pain management to physician  Outcome: Progressing  Goal: Patient vital signs are stable  Description  1  Assess vital signs - vaginal delivery    Outcome: Progressing

## 2020-03-18 NOTE — OB LABOR/OXYTOCIN SAFETY PROGRESS
Labor Progress Note - Isadore Kayser 47 y o  female MRN: [id number]    Unit/Bed#: -01 Encounter: [id number]       Contraction Frequency (minutes): (irritability, unable to determine)  Contraction Quality: Mild  Tachysystole: No   Dilation: Closed        Effacement (%): 0  Station: -2  Baseline Rate: 140 bpm  Fetal Heart Rate: 140 BPM  FHR Category: Category II             Notes/comments:      FHT is currently category II with recurrent late decelerations and moderate to minimal variability  Dr Riana Milner discussed options with the patient and it's decided that she will proceed to the OR for a primary  for a category II FHT remote from delivery  Patient is in agreement, all questions answered and consent signed  Will order Ancef 2g IV and Azithromycin 500 mg IV for surgical prophylaxis         Nisa Emanuel MD 3/18/2020 7:16 AM

## 2020-03-18 NOTE — H&P
H&P Exam - Obstetrics   Maynard Gaucher 47 y o  female MRN: [id number]  Unit/Bed#: -01 Encounter: [id number]      History of Present Illness     Chief Complaint: Induction of labor    HPI:  Maynard Gaucher is a 47 y o   female with an JOSE of 3/25/2020, by Ultrasound at 38w6d weeks gestation who is being admitted for induction of labor  She denies contractions, leakage of fluid, vaginal bleeding, and has positive fetal movement  This pregnancy is complicated by A8SSP, which has been well controlled with insulin  She reports taking Levemir 20 units nightly and Humalog 5, 10, 14 before meals  The patient is AMA and this is an IVF pregnancy with an egg donor  She is [facility] patient  PREGNANCY COMPLICATIONS:   A3BPJ  AMA  IVF pregnancy     OB History    Para Term  AB Living   2       1 0   SAB TAB Ectopic Multiple Live Births                  # Outcome Date GA Lbr Stoney/2nd Weight Sex Delivery Anes PTL Lv   2 Current            1 AB      SAB          Baby complications/comments: None    Review of Systems   Constitutional: Negative for chills and fever  Eyes: Negative for visual disturbance  Respiratory: Negative for cough and shortness of breath  Cardiovascular: Negative for chest pain, palpitations and leg swelling  Gastrointestinal: Negative for abdominal pain, diarrhea, nausea and vomiting  Genitourinary: Negative for dysuria, hematuria, pelvic pain, vaginal bleeding, vaginal discharge and vaginal pain  Neurological: Negative for dizziness, syncope, weakness, light-headedness and headaches           Historical Information   Past Medical History:   Diagnosis Date    Abnormal Pap smear of cervix     6 yrs ago    Female infertility     Miscarriage     , 2016    Recurrent pregnancy loss, antepartum condition or complication     x 2 5072,-N & C; 2016-chemical pregnancy    Thalassemia     carrier    Urinary tract infection     last episode     Varicella childhood chickenpox     Past Surgical History:   Procedure Laterality Date    DILATION AND CURETTAGE OF UTERUS  2015    WISDOM TOOTH EXTRACTION       Social History   Social History     Substance and Sexual Activity   Alcohol Use Not Currently    Alcohol/week: 2 0 standard drinks    Types: 2 Glasses of wine per week     Social History     Substance and Sexual Activity   Drug Use No     Social History     Tobacco Use   Smoking Status Never Smoker   Smokeless Tobacco Never Used     Family History: non-contributory    Meds/Allergies      Medications Prior to Admission   Medication    aspirin (ECOTRIN LOW STRENGTH) 81 mg EC tablet    famotidine (PEPCID) 10 mg tablet    HUMALOG KWIKPEN 100 units/mL injection pen    LEVEMIR FLEXTOUCH 100 units/mL injection pen    Prenatal Vit-Fe Fumarate-FA (PRENATAL VITAMIN) 28-0 8 mg    Insulin Pen Needle 31G X 5 MM MISC    ONETOUCH DELICA LANCETS 50D MISC    ONETOUCH VERIO test strip      No Known Allergies    OBJECTIVE:    Vitals: Blood pressure 110/68, pulse 74, temperature 98 6 °F (37 °C), temperature source Temporal, resp  rate 18, height 5' 2" (1 575 m), weight 68 9 kg (152 lb), last menstrual period 05/10/2019, unknown if currently breastfeeding  Body mass index is 27 8 kg/m²  Physical Exam   Constitutional: She is oriented to person, place, and time  She appears well-developed and well-nourished  No distress  HENT:   Head: Normocephalic and atraumatic  Cardiovascular: Normal rate, regular rhythm and normal heart sounds  Pulmonary/Chest: Effort normal and breath sounds normal  No respiratory distress  Abdominal: There is no tenderness  There is no guarding  Gravid uterus   Genitourinary: Vagina normal  No vaginal discharge found  Musculoskeletal: She exhibits no edema or tenderness  Neurological: She is alert and oriented to person, place, and time  Skin: Skin is warm and dry  Psychiatric: She has a normal mood and affect   Her behavior is normal  Thought content normal      Confirmed vertex on ultrasound    Ferning: N/A  Nitrazine: N/A    Cervix:  Dilation: Closed  Effacement (%): 0  Station: -3    Fetal heart rate:   Baseline Rate: 120 bpm  Variability: Moderate 6-25 bpm  Accelerations: 15 x 15 or greater, At variable times  Decelerations: None  FHR Category: Category I    [place]:   Contraction Frequency (minutes): 1-5(with irritability)  Contraction Duration (seconds): 40-60  Contraction Quality: Mild    EFW: 7 5 lbs    GBS: Negative    Prenatal Labs:   Blood Type:   Lab Results   Component Value Date/Time    ABO Grouping O 03/17/2020 08:56 PM    ABO Grouping O 06/29/2014 09:38 AM     , D (Rh type):   Lab Results   Component Value Date/Time    Rh Factor Positive 03/17/2020 08:56 PM    Rh Factor Positive 06/29/2014 09:38 AM      HCT/HGB:   Lab Results   Component Value Date/Time    Hematocrit 34 4 (L) 03/17/2020 08:56 PM    Hematocrit 32 2 (L) 06/29/2014 09:38 AM    Hemoglobin 10 7 (L) 03/17/2020 08:56 PM    Hemoglobin 10 3 (L) 06/29/2014 09:38 AM      , MCV:   Lab Results   Component Value Date/Time    MCV 71 (L) 03/17/2020 08:56 PM    MCV 65 (L) 06/29/2014 09:38 AM      , Platelets:   Lab Results   Component Value Date/Time    Platelets 187 77/57/7710 08:56 PM    Platelets 105 90/80/3366 09:38 AM      , 1 hour Glucola:   Lab Results   Component Value Date/Time    Glucose 139 (H) 09/09/2019 11:01 AM   , 3 hour GTT:   Lab Results   Component Value Date/Time    Glucose, GTT - 3 Hour 111 10/07/2019 11:13 AM   ,Rubella:  Will order  Lab Results   Component Value Date/Time    RUBELLA IGG QUANTITATION >175 0 06/29/2014 09:38 AM        , VDRL/RPR:   Lab Results   Component Value Date/Time    RPR SCREEN Nonreactive 06/29/2014 09:38 AM    RPR Non-Reactive 01/30/2020 01:28 PM      , Urine Culture/Screen:   Lab Results   Component Value Date/Time    Urine Culture No Growth <1000 cfu/mL 09/09/2019 10:05 AM        Hep B: Will order  Lab Results   Component Value Date/Time HEPATITIS B SURFACE ANTIGEN Non-Reactive (q 2014 09:38 AM      HIV: Will order   Lab Results   Component Value Date/Time    HIV-1/2 AB-AG Non-Reactive (q 2014 09:38 AM    Chlamydia: Negative   Gonorrhea: Negative   Group B Strep:    Lab Results   Component Value Date/Time    Strep Grp B JOEY Negative 2020 02:48 PM          Invasive Devices     Peripheral Intravenous Line            Peripheral IV 20 Distal;Left;Dorsal (posterior) Forearm less than 1 day    Peripheral IV 20 Right;Dorsal (posterior) Hand less than 1 day                  Assessment/Plan     ASSESSMENT:  51yo  at 38w6d weeks gestation who is being admitted for and IOL with A2GDM  PLAN:    - Admit  - CBC, RPR, Blood Type  - Will order HIV, Hepatitis B, Rubella   - Start with vaginal Cytotec   - GBS negative status  - Analgesia and/or epidural at patient request  - Anticipate     Discussed with Dr Nelli Dolan      This patient will be an INPATIENT  and I certify the anticipated length of stay is >2 Midnights

## 2020-03-18 NOTE — OB LABOR/OXYTOCIN SAFETY PROGRESS
Labor Progress Note - Paul Mclean 47 y o  female MRN: [id number]    Unit/Bed#: -01 Encounter: [id number]       Contraction Frequency (minutes): 1-4(with irritability noted)  Contraction Quality: Mild, Moderate  Tachysystole: No   Dilation: Closed        Effacement (%): 0  Station: -2  Baseline Rate: 140 bpm  Fetal Heart Rate: 152 BPM  FHR Category: Category I             Notes/comments:     Cervix is still closed and fleshed  Complaining of a significant amount of back pain with the contractions  She's had one dose of Stadol but it did not really help  FHT currently with minimal-moderate variability  Will offer her Morphine 10 mg IM and phenergan 12 5 mg IM      Kaye Bailey MD 3/18/2020 6:18 AM

## 2020-03-18 NOTE — OB LABOR/OXYTOCIN SAFETY PROGRESS
Labor Progress Note - Paul Mclean 47 y o  female MRN: [id number]    Unit/Bed#: -01 Encounter: [id number]       Contraction Frequency (minutes): 2-6(irritability)  Contraction Quality: Mild  Tachysystole: No   Dilation: Closed        Effacement (%): 0  Station: -3  Baseline Rate: 120 bpm  Fetal Heart Rate: 140 BPM  FHR Category: Category I             Notes/comments:   First dose of vaginal Cytotec placed at 2216  FHT with an occasional deceleration but reactive with moderate variability  Will monitor closely and recheck in approximately 3 hours       Kaye Bailey MD 3/17/2020 11:02 PM

## 2020-03-18 NOTE — PLAN OF CARE
Problem: PAIN - ADULT  Goal: Verbalizes/displays adequate comfort level or baseline comfort level  Description  Interventions:  - Encourage patient to monitor pain and request assistance  - Assess pain using appropriate pain scale  - Administer analgesics based on type and severity of pain and evaluate response  - Implement non-pharmacological measures as appropriate and evaluate response  - Consider cultural and social influences on pain and pain management  - Notify physician/advanced practitioner if interventions unsuccessful or patient reports new pain  Outcome: Progressing     Problem: INFECTION - ADULT  Goal: Absence or prevention of progression during hospitalization  Description  INTERVENTIONS:  - Assess and monitor for signs and symptoms of infection  - Monitor lab/diagnostic results  - Monitor all insertion sites, i e  indwelling lines, tubes, and drains  - Monitor endotracheal if appropriate and nasal secretions for changes in amount and color  - [place] appropriate cooling/warming therapies per order  - Administer medications as ordered  - Instruct and encourage patient and family to use good hand hygiene technique  - Identify and instruct in appropriate isolation precautions for identified infection/condition  Outcome: Progressing     Problem: SAFETY ADULT  Goal: Patient will remain free of falls  Description  INTERVENTIONS:  - Assess patient frequently for physical needs  -  Identify cognitive and physical deficits and behaviors that affect risk of falls    -  [place] fall precautions as indicated by assessment   - Educate patient/family on patient safety including physical limitations  - Instruct patient to call for assistance with activity based on assessment  - Modify environment to reduce risk of injury  - Consider OT/PT consult to assist with strengthening/mobility  Outcome: Progressing  Goal: Maintain or return to baseline ADL function  Description  INTERVENTIONS:  -  Assess patient's ability to carry out ADLs; assess patient's baseline for ADL function and identify physical deficits which impact ability to perform ADLs (bathing, care of mouth/teeth, toileting, grooming, dressing, etc )  - Assess/evaluate cause of self-care deficits   - Assess range of motion  - Assess patient's mobility; develop plan if impaired  - Assess patient's need for assistive devices and provide as appropriate  - Encourage maximum independence but intervene and supervise when necessary  - Involve family in performance of ADLs  - Assess for home care needs following discharge   - Consider OT consult to assist with ADL evaluation and planning for discharge  - Provide patient education as appropriate  Outcome: Progressing  Goal: Maintain or return mobility status to optimal level  Description  INTERVENTIONS:  - Assess patient's baseline mobility status (ambulation, transfers, stairs, etc )    - Identify cognitive and physical deficits and behaviors that affect mobility  - Identify mobility aids required to assist with transfers and/or ambulation (gait belt, sit-to-stand, lift, walker, cane, etc )  - [place] fall precautions as indicated by assessment  - Record patient progress and toleration of activity level on Mobility SBAR; progress patient to next Phase/Stage  - Instruct patient to call for assistance with activity based on assessment  - Consider rehabilitation consult to assist with strengthening/weightbearing, etc   Outcome: Progressing     Problem: Knowledge Deficit  Goal: Patient/family/caregiver demonstrates understanding of disease process, treatment plan, medications, and discharge instructions  Description  Complete learning assessment and assess knowledge base    Interventions:  - Provide teaching at level of understanding  - Provide teaching via preferred learning methods  Outcome: Progressing     Problem: DISCHARGE PLANNING  Goal: Discharge to home or other facility with appropriate resources  Description  INTERVENTIONS:  - Identify barriers to discharge w/patient and caregiver  - Arrange for needed discharge resources and transportation as appropriate  - Identify discharge learning needs (meds, wound care, etc )  - Arrange for interpretive services to assist at discharge as needed  - Refer to Case Management Department for coordinating discharge planning if the patient needs post-hospital services based on physician/advanced practitioner order or complex needs related to functional status, cognitive ability, or social support system  Outcome: Progressing     Problem: POSTPARTUM  Goal: Experiences normal postpartum course  Description  INTERVENTIONS:  - Monitor maternal vital signs  - Assess uterine involution and lochia  Outcome: Progressing  Goal: Appropriate maternal -  bonding  Description  INTERVENTIONS:  - Identify family support  - Assess for appropriate maternal/infant bonding   -Encourage maternal/infant bonding opportunities  - Referral to  or  as needed  Outcome: Progressing  Goal: Establishment of infant feeding pattern  Description  INTERVENTIONS:  - Assess breast/bottle feeding  - Refer to lactation as needed  Outcome: Progressing  Goal: Incision(s), wounds(s) or drain site(s) healing without S/S of infection  Description  INTERVENTIONS  - Assess and document risk factors for skin impairment   - Assess and document dressing, incision, wound bed, drain sites and surrounding tissue  - Consider nutrition services referral as needed  - Oral mucous membranes remain intact  - Provide patient/ family education  Outcome: Progressing     Problem: BIRTH - VAGINAL/ SECTION  Goal: Fetal and maternal status remain reassuring during the birth process  Description  INTERVENTIONS:  - Monitor vital signs  - Monitor fetal heart rate  - Monitor uterine activity  - Monitor labor progression (vaginal delivery)  - DVT prophylaxis  - Antibiotic prophylaxis  Outcome: Completed  Goal: Emotionally satisfying birthing experience for mother/fetus  Description  Interventions:  - Assess, plan, implement and evaluate the nursing care given to the patient in labor  - Advocate the philosophy that each childbirth experience is a unique experience and support the family's chosen level of involvement and control during the labor process   - Actively participate in both the patient's and family's teaching of the birth process  - Consider cultural, Islam and age-specific factors and plan care for the patient in labor  Outcome: Completed     Problem: Knowledge Deficit  Goal: Verbalizes understanding of labor plan  Description  Assess patient/family/caregiver's baseline knowledge level and ability to understand information  Provide education via patient/family/caregiver's preferred learning method at appropriate level of understanding  1  Provide teaching at level of understanding  2  Provide teaching via preferred learning method(s)  Outcome: Completed     Problem: Labor & Delivery  Goal: Manages discomfort  Description  Assess and monitor for signs and symptoms of discomfort  Assess patient's pain level regularly and per hospital policy  Administer medications as ordered  Support use of nonpharmacological methods to help control pain such as distraction, imagery, relaxation, and application of heat and cold  Collaborate with interdisciplinary team and patient to determine appropriate pain management plan  1  Include patient in decisions related to comfort  2  Offer non-pharmacological pain management interventions  3  Report ineffective pain management to physician  Outcome: Completed  Goal: Patient vital signs are stable  Description  1  Assess vital signs - vaginal delivery    Outcome: Completed

## 2020-03-18 NOTE — OP NOTE
OPERATIVE REPORT  PATIENT NAME: Anselmo Em    :  1970  MRN: [id number]  Pt Location: [facility] L[facility] OR ROOM 01    SURGERY DATE: 3/18/2020    Surgeon(s) and Role:     * Michelle Elizabeth MD - Primary    Assistant:  Deloris Amin MD    Preop Diagnosis:  Non-reassuring fetal heart tones ( category II tracing)  Insulin-dependent gestational diabetes  AMA  IVF Pregnancy    Post-Op Diagnosis Codes:  Same, delivered    Procedure(s) (LRB):  PRIMARY LOW TRANSVERSE  SECTION ()     Specimen(s):  ID Type Source Tests Collected by Time Destination   1 :  Tissue (Placenta on Hold) OB Only Placenta TISSUE EXAM OB (PLACENTA) ONLY Michelle Elizabeth MD 3/18/2020 0830    A :  Cord Blood Cord BLOOD GAS, VENOUS, CORD, BLOOD GAS, ARTERIAL, CORD Michelle Elizabeth MD 3/18/2020 1360      Quantified Blood Loss:   209mL    Drains:  [place]    Anesthesia Type:   Spinal    Operative Indications:  Non-reassuring fetal heart tones (persistent category II tracing)    Operative Findings:  Viable female infant vertex presentation Apgars 1,8  Weight 6lbs 14oz  Clear amniotic fluid  Placenta inatct with 3VC  No nuchal cord  Normal uterus and adnexa bilaterally  Complications:   None    Procedure and Technique:  After consent was obtained the patient was taken to the OR where spinal anesthesia was administered  She was prepped abdominal and vaginally  A time-out was performed  FHT's were in the 80-90's prior to discontinuation of monitor  The knife was used to make the skin incision in the Pfannensteil manner  The incision was carried to the underlying fascia using the knife as well  The fascia was nicked in the midline and stretched manually  The rectus muscles were split and and the peritoneum identified high in the midline  This was entered bluntly and the bladder blade placed  The peritoneum was incised using the Metzenbaum scissors and a bladder flap created    The bladder blade was replaced and the hysterotomy created using the knife  Clear fluid was noted upon entry  The infant's head was easily delivered out of the hysterotomy followed by the remainder the body  Cord was clamped x 2 and cut  Cord gas and blood type were collected  Pitocin infusion was given  Placenta was removed intact  The uterus was exteriorized and cleaned of all clot and debris using moist laparotomy sponges  The uterus was closed in two layers using 2-0 vicryl  The uterus was returned to the abdomen and the pelvis irrigated  The hysterotomy was inspected in situ and found to be hemostatic  The rectus muscles were reapproximated using 2-0 vicryl  The fascia was closed from lateral to midline using 2 separate running sutures of 0 vicryl  The subcutaneous space was irrigated and then closed with a running suture of 3-0 plain  The skin was then closed with a running suture of 4-0 vicryl  A sterile dressing was then applied  All instrument, sponge, and needle counts were correct at the end of the procedure  Wanding at the end of the procedure confirmed the same       I was present for the entire procedure, A qualified resident physician was not available and A co-surgeon was required because of skills and techniques relevant to speciality    Patient Disposition:  PACU  and hemodynamically stable    SIGNATURE: Gaylin Lesches, MD  DATE: March 18, 2020  TIME: 9:38 AM

## 2020-03-18 NOTE — OB LABOR/OXYTOCIN SAFETY PROGRESS
Labor Progress Note - Maynard Gaucher 47 y o  female MRN: [id number]    Unit/Bed#: -01 Encounter: [id number]       Contraction Frequency (minutes): 1-5  Contraction Quality: Mild  Tachysystole: No   Dilation: Closed        Effacement (%): 100  Station: -2  Baseline Rate: 130 bpm  Fetal Heart Rate: 135 BPM  FHR Category: Category I             Notes/comments:   Second dose of vaginal Cytotec placed at 0254  FHT currently category I  Will recheck in approximately 3 hours       Carole Pandey MD 3/18/2020 2:55 AM

## 2020-03-18 NOTE — ANESTHESIA POSTPROCEDURE EVALUATION
Post-Op Assessment Note    CV Status:  Stable  Pain Score: 0    Pain management: adequate     Mental Status:  Alert and awake   Hydration Status:  Stable   PONV Controlled:  None   Airway Patency:  Patent   Post Op Vitals Reviewed: Yes      Staff: CRNA           BP   94/53   Temp   97 2   Pulse  84   Resp   15   SpO2   98% on RA   Postop VS in PACU noted above, SV non-obstructed  Unable to wiggle feet yet  Denies pain

## 2020-03-18 NOTE — ANESTHESIA PROCEDURE NOTES
Spinal Block    Patient location during procedure: OB  Start time: 3/18/2020 8:16 AM  Reason for block: primary anesthetic  Staffing  Resident/CRNA: Severo Barrette, CRNA  Performed: resident/CRNA   Preanesthetic Checklist  Completed: patient identified, site marked, surgical consent, pre-op evaluation, timeout performed, IV checked, risks and benefits discussed and monitors and equipment checked  Spinal Block  Patient position: sitting  Prep: Betadine  Patient monitoring: continuous pulse ox, frequent blood pressure checks and heart rate  Approach: midline  Location: L3-4  Injection technique: single-shot  Needle  Needle type: pencil-tip   Needle gauge: 27 G  Assessment  Sensory level: T4  Events: cerebrospinal fluid  Injection Assessment:  negative aspiration for heme, no paresthesia on injection and positive aspiration for clear CSF    Post-procedure:  site cleaned  Additional Notes  1 attempt, 20g introducer

## 2020-03-18 NOTE — ANESTHESIA PREPROCEDURE EVALUATION
Review of Systems/Medical History  Patient summary reviewed  Chart reviewed      Cardiovascular  Negative cardio ROS    Pulmonary  Negative pulmonary ROS        GI/Hepatic  Negative GI/hepatic ROS          Negative  ROS        Endo/Other  Diabetes well controlled gestational Insulin,      GYN  Negative gynecology ROS          Hematology  Anemia (Thalassemia ) ,     Musculoskeletal  Negative musculoskeletal ROS        Neurology  Negative neurology ROS      Psychology   Negative psychology ROS              Physical Exam    Airway    Mallampati score: III  TM Distance: >3 FB  Neck ROM: full     Dental   No notable dental hx     Cardiovascular  Comment: Negative ROS,     Pulmonary      Other Findings        Anesthesia Plan  ASA Score- 3     Anesthesia Type- spinal with ASA Monitors  Additional Monitors:   Airway Plan:         Plan Factors-    Induction- intravenous  Postoperative Plan-     Informed Consent- Anesthetic plan and risks discussed with patient  I personally reviewed this patient with the CRNA  Discussed and agreed on the Anesthesia Plan with the CRNA  Jaime Munoz Results for Ivon [place] (MRN [id number]) as of 3/18/2020 07:32   Ref   Range 3/17/2020 20:56   WBC Latest Ref Range: 4 31 - 10 16 Thousand/uL 10 42 (H)   Red Blood Cell Count Latest Ref Range: 3 81 - 5 12 Million/uL 4 83   Hemoglobin Latest Ref Range: 11 5 - 15 4 g/dL 10 7 (L)   HCT Latest Ref Range: 34 8 - 46 1 % 34 4 (L)   MCV Latest Ref Range: 82 - 98 fL 71 (L)   MCH Latest Ref Range: 26 8 - 34 3 pg 22 2 (L)   MCHC Latest Ref Range: 31 4 - 37 4 g/dL 31 1 (L)   RDW Latest Ref Range: 11 6 - 15 1 % 17 6 (H)   Platelet Count Latest Ref Range: 149 - 390 Thousands/uL 175

## 2020-03-18 NOTE — LACTATION NOTE
This note was copied from a baby's chart  CONSULT - LACTATION  Baby Girl Roopa Redd Erp 0 days female MRN: [id number]    [facility] Bed: (N)/(N) Encounter: [id number]    Maternal Information     MOTHER:  Floridalma Davison  Maternal Age: 47 y o    OB History: #: 1, Date: None, Sex: None, Weight: None, GA: None, Delivery: Spontaneous , Apgar1: None, Apgar5: None, Living: None, Birth Comments: None    #: 2, Date: 20, Sex: Female, Weight: 3118 g (6 lb 14 oz), GA: 39w0d, Delivery: , Low Transverse, Apgar1: 1, Apgar5: 8, Living: Living, Birth Comments: None   Previouse breast reduction surgery? No    Lactation history:   Has patient previously breast fed: No   How long had patient previously breast fed:     Previous breast feeding complications:       Past Surgical History:   Procedure Laterality Date    DILATION AND CURETTAGE OF UTERUS      WISDOM TOOTH EXTRACTION         Birth information:  YOB: 2021   Time of birth: 8:28 AM   Sex: female   Delivery type: , Low Transverse   Birth Weight: 3118 g (6 lb 14 oz)   Percent of Weight Change: 0%     Gestational Age: 39w0d   [unfilled]    Assessment     Breast and nipple assessment: normal assessment     Assessment: normal assessment    Feeding assessment: feeding well  LATCH:  Latch: Grasps breast, tongue down, lips flanged, rhythmic sucking   Audible Swallowing: Spontaneous and intermittent (24 hours old)   Type of Nipple: Everted (After stimulation)   Comfort (Breast/Nipple): Soft/non-tender   Hold (Positioning): Partial assist, teach one side, mother does other, staff holds   LATCH Score: 9          Feeding recommendations:  breast feed on demand, discussed IDM protocol to feed every 3 hours at this time     Met with mother  Provided mother with Ready, Set, Baby booklet  Discussed Skin to Skin contact an benefits to mom and baby    Talked about the delay of the first bath until baby has adjusted  Spoke about the benefits of rooming in  Feeding on cue and what that means for recognizing infant's hunger  Avoidance of pacifiers for the first month discussed  Talked about exclusive breastfeeding for the first 6 months  Positioning and latch reviewed as well as showing images of other feeding positions  Discussed the properties of a good latch in any position  Reviewed hand/manual expression  Discussed s/s that baby is getting enough milk and some s/s that breastfeeding dyad may need further help  Gave information on common concerns, what to expect the first few weeks after delivery, preparing for other caregivers, and how partners can help  Resources for support also provided  Information on hand expression given  Discussed benefits of knowing how to manually express breast including stimulating milk supply, softening nipple for latch and evacuating breast in the event of engorgement  Mom effectively demonstrates hand expression  Discussed 2nd night syndrome and ways to calm infant  Hand out given  Worked on positioning infant up at chest level and starting to feed infant with nose arriving at the nipple  Then, using areolar compression to achieve a deep latch that is comfortable and exchanges optimum amounts of milk  Baby latching well in football hold  Mom supports and latches infant independently to the L after review  Demonstrated paced-bottle feeding technique  Enc Mom to call for lactation support as needed throughout her stay  Ext provided     Martina Mendez, RN 3/18/2020 2:18 PM

## 2020-03-19 LAB
BASOPHILS # BLD AUTO: 0.05 THOUSANDS/ΜL (ref 0–0.1)
BASOPHILS NFR BLD AUTO: 1 % (ref 0–1)
EOSINOPHIL # BLD AUTO: 0.23 THOUSAND/ΜL (ref 0–0.61)
EOSINOPHIL NFR BLD AUTO: 2 % (ref 0–6)
ERYTHROCYTE [DISTWIDTH] IN BLOOD BY AUTOMATED COUNT: 18 % (ref 11.6–15.1)
HCT VFR BLD AUTO: 32.2 % (ref 34.8–46.1)
HGB BLD-MCNC: 10 G/DL (ref 11.5–15.4)
IMM GRANULOCYTES # BLD AUTO: 0.07 THOUSAND/UL (ref 0–0.2)
IMM GRANULOCYTES NFR BLD AUTO: 1 % (ref 0–2)
LYMPHOCYTES # BLD AUTO: 1.27 THOUSANDS/ΜL (ref 0.6–4.47)
LYMPHOCYTES NFR BLD AUTO: 13 % (ref 14–44)
MCH RBC QN AUTO: 22.7 PG (ref 26.8–34.3)
MCHC RBC AUTO-ENTMCNC: 31.1 G/DL (ref 31.4–37.4)
MCV RBC AUTO: 73 FL (ref 82–98)
MONOCYTES # BLD AUTO: 0.66 THOUSAND/ΜL (ref 0.17–1.22)
MONOCYTES NFR BLD AUTO: 7 % (ref 4–12)
NEUTROPHILS # BLD AUTO: 7.8 THOUSANDS/ΜL (ref 1.85–7.62)
NEUTS SEG NFR BLD AUTO: 76 % (ref 43–75)
NRBC BLD AUTO-RTO: 0 /100 WBCS
PLATELET # BLD AUTO: 167 THOUSANDS/UL (ref 149–390)
PMV BLD AUTO: 12 FL (ref 8.9–12.7)
RBC # BLD AUTO: 4.41 MILLION/UL (ref 3.81–5.12)
WBC # BLD AUTO: 10.08 THOUSAND/UL (ref 4.31–10.16)

## 2020-03-19 PROCEDURE — 99024 POSTOP FOLLOW-UP VISIT: CPT | Performed by: STUDENT IN AN ORGANIZED HEALTH CARE EDUCATION/TRAINING PROGRAM

## 2020-03-19 PROCEDURE — 99024 POSTOP FOLLOW-UP VISIT: CPT | Performed by: OBSTETRICS & GYNECOLOGY

## 2020-03-19 PROCEDURE — 85025 COMPLETE CBC W/AUTO DIFF WBC: CPT | Performed by: OBSTETRICS & GYNECOLOGY

## 2020-03-19 RX ADMIN — IBUPROFEN 600 MG: 600 TABLET ORAL at 06:11

## 2020-03-19 RX ADMIN — DOCUSATE SODIUM 100 MG: 100 CAPSULE, LIQUID FILLED ORAL at 08:21

## 2020-03-19 RX ADMIN — Medication 1 TABLET: at 08:23

## 2020-03-19 RX ADMIN — IBUPROFEN 600 MG: 600 TABLET ORAL at 12:15

## 2020-03-19 RX ADMIN — ACETAMINOPHEN 650 MG: 325 TABLET, FILM COATED ORAL at 22:58

## 2020-03-19 RX ADMIN — OXYCODONE HYDROCHLORIDE AND ACETAMINOPHEN 1 TABLET: 5; 325 TABLET ORAL at 08:23

## 2020-03-19 RX ADMIN — ENOXAPARIN SODIUM 40 MG: 40 INJECTION SUBCUTANEOUS at 08:22

## 2020-03-19 RX ADMIN — FAMOTIDINE 10 MG: 20 TABLET ORAL at 08:22

## 2020-03-19 RX ADMIN — FAMOTIDINE 10 MG: 20 TABLET ORAL at 17:33

## 2020-03-19 RX ADMIN — IBUPROFEN 600 MG: 600 TABLET ORAL at 17:33

## 2020-03-19 RX ADMIN — DOCUSATE SODIUM 100 MG: 100 CAPSULE, LIQUID FILLED ORAL at 17:32

## 2020-03-19 NOTE — PLAN OF CARE
Problem: PAIN - ADULT  Goal: Verbalizes/displays adequate comfort level or baseline comfort level  Description  Interventions:  - Encourage patient to monitor pain and request assistance  - Assess pain using appropriate pain scale  - Administer analgesics based on type and severity of pain and evaluate response  - Implement non-pharmacological measures as appropriate and evaluate response  - Consider cultural and social influences on pain and pain management  - Notify physician/advanced practitioner if interventions unsuccessful or patient reports new pain  Outcome: Progressing     Problem: INFECTION - ADULT  Goal: Absence or prevention of progression during hospitalization  Description  INTERVENTIONS:  - Assess and monitor for signs and symptoms of infection  - Monitor lab/diagnostic results  - Monitor all insertion sites, i e  indwelling lines, tubes, and drains  - Monitor endotracheal if appropriate and nasal secretions for changes in amount and color  - [place] appropriate cooling/warming therapies per order  - Administer medications as ordered  - Instruct and encourage patient and family to use good hand hygiene technique  - Identify and instruct in appropriate isolation precautions for identified infection/condition  Outcome: Progressing     Problem: SAFETY ADULT  Goal: Patient will remain free of falls  Description  INTERVENTIONS:  - Assess patient frequently for physical needs  -  Identify cognitive and physical deficits and behaviors that affect risk of falls    -  [place] fall precautions as indicated by assessment   - Educate patient/family on patient safety including physical limitations  - Instruct patient to call for assistance with activity based on assessment  - Modify environment to reduce risk of injury  - Consider OT/PT consult to assist with strengthening/mobility  Outcome: Progressing  Goal: Maintain or return to baseline ADL function  Description  INTERVENTIONS:  -  Assess patient's ability to carry out ADLs; assess patient's baseline for ADL function and identify physical deficits which impact ability to perform ADLs (bathing, care of mouth/teeth, toileting, grooming, dressing, etc )  - Assess/evaluate cause of self-care deficits   - Assess range of motion  - Assess patient's mobility; develop plan if impaired  - Assess patient's need for assistive devices and provide as appropriate  - Encourage maximum independence but intervene and supervise when necessary  - Involve family in performance of ADLs  - Assess for home care needs following discharge   - Consider OT consult to assist with ADL evaluation and planning for discharge  - Provide patient education as appropriate  Outcome: Progressing  Goal: Maintain or return mobility status to optimal level  Description  INTERVENTIONS:  - Assess patient's baseline mobility status (ambulation, transfers, stairs, etc )    - Identify cognitive and physical deficits and behaviors that affect mobility  - Identify mobility aids required to assist with transfers and/or ambulation (gait belt, sit-to-stand, lift, walker, cane, etc )  - [place] fall precautions as indicated by assessment  - Record patient progress and toleration of activity level on Mobility SBAR; progress patient to next Phase/Stage  - Instruct patient to call for assistance with activity based on assessment  - Consider rehabilitation consult to assist with strengthening/weightbearing, etc   Outcome: Progressing     Problem: Knowledge Deficit  Goal: Patient/family/caregiver demonstrates understanding of disease process, treatment plan, medications, and discharge instructions  Description  Complete learning assessment and assess knowledge base    Interventions:  - Provide teaching at level of understanding  - Provide teaching via preferred learning methods  Outcome: Progressing     Problem: DISCHARGE PLANNING  Goal: Discharge to home or other facility with appropriate resources  Description  INTERVENTIONS:  - Identify barriers to discharge w/patient and caregiver  - Arrange for needed discharge resources and transportation as appropriate  - Identify discharge learning needs (meds, wound care, etc )  - Arrange for interpretive services to assist at discharge as needed  - Refer to Case Management Department for coordinating discharge planning if the patient needs post-hospital services based on physician/advanced practitioner order or complex needs related to functional status, cognitive ability, or social support system  Outcome: Progressing     Problem: POSTPARTUM  Goal: Experiences normal postpartum course  Description  INTERVENTIONS:  - Monitor maternal vital signs  - Assess uterine involution and lochia  Outcome: Progressing  Goal: Appropriate maternal -  bonding  Description  INTERVENTIONS:  - Identify family support  - Assess for appropriate maternal/infant bonding   -Encourage maternal/infant bonding opportunities  - Referral to  or  as needed  Outcome: Progressing  Goal: Establishment of infant feeding pattern  Description  INTERVENTIONS:  - Assess breast/bottle feeding  - Refer to lactation as needed  Outcome: Progressing  Goal: Incision(s), wounds(s) or drain site(s) healing without S/S of infection  Description  INTERVENTIONS  - Assess and document risk factors for skin impairment   - Assess and document dressing, incision, wound bed, drain sites and surrounding tissue  - Consider nutrition services referral as needed  - Oral mucous membranes remain intact  - Provide patient/ family education  Outcome: Progressing

## 2020-03-19 NOTE — PATIENT INSTRUCTIONS

## 2020-03-19 NOTE — PROGRESS NOTES
Progress Note - OB/GYN   Isadore Kayser 47 y o  female MRN: [id number]  Unit/Bed#: -01 Encounter: [id number]    Assessment:  Post partum Day #1 s/p 1LTCS, stable, baby in room  Blood pressures: 100s/50s-60s, 1 /66      Plan:  1) Post op care   - QBL 209ml, Hgb 10 7 -> 10    2) DVT ppx   - Lovenox 40mg qd    3) Continue routine post partum care   - Encourage ambulation   - Encourage breastfeeding   - Anticipate discharge home on POD#3     Subjective/Objective   Chief Complaint:     Post delivery  Patient is doing well  Lochia WNL  Pain well controlled  Subjective:     Pain: yes, cramping, improved with meds  Tolerating PO: yes  Voiding: yes  Flatus: no  BM: no  Ambulating: yes  Breastfeeding:  yes  Chest pain: no  Shortness of breath: no  Leg pain: no  Lochia: WNL    Objective:     Vitals: /64 (BP Location: Right arm)   Pulse 80   Temp 98 °F (36 7 °C) (Oral)   Resp 18   Ht 5' 2" (1 575 m)   Wt 68 9 kg (152 lb)   LMP 05/10/2019 (Exact Date)   SpO2 96%   Breastfeeding Yes   BMI 27 80 kg/m²       Intake/Output Summary (Last 24 hours) at 3/19/2020 0737  Last data filed at 3/19/2020 0600  Gross per 24 hour   Intake 2708 33 ml   Output 2209 ml   Net 499 33 ml       Lab Results   Component Value Date    WBC 10 08 03/19/2020    HGB 10 0 (L) 03/19/2020    HCT 32 2 (L) 03/19/2020    MCV 73 (L) 03/19/2020     03/19/2020       Physical Exam:     Gen: AAOx3, NAD  CV: RRR  Lungs: CTA b/l  Abd: Soft, non-tender, non-distended, no rebound or guarding  Uterine fundus firm and non-tender, 2 cm below the umbilicus     Ext: Non tender    Incision: clean/dry/intact      Rancho oRse MD  OB/GYN PGY-2  3/19/2020  7:37 AM

## 2020-03-19 NOTE — PROGRESS NOTES
[address] [address]: Ms Fuentes Abdi was seen for NST which was reactive  Please don't hesitate to contact our office with any concerns or questions    Naomi Graf MD

## 2020-03-19 NOTE — DISCHARGE INSTRUCTIONS
Section   WHAT YOU SHOULD KNOW:   A  delivery, or , is abdominal surgery to deliver your baby  There are many reasons you may need a   · A  may be scheduled before labor if you had a  with your last baby  It may be scheduled if your baby is not positioned normally, or you are pregnant with more than 1 baby  · Your caregiver may perform an emergency  during labor to prevent life-threatening complications for you or your baby  A  may be done if your cervix does not dilate after several hours of active labor  · Other reasons for a  include maternal infections and problems with the placenta  AFTER YOU LEAVE:   Medicines:   · Prescription pain medicine  may be given  Ask how to take this medicine safely  · Acetaminophen  decreases pain and fever  It is available without a doctor's order  Ask how much to take and how often to take it  Follow directions  Acetaminophen can cause liver damage if not taken correctly  · NSAIDs  help decrease swelling and pain or fever  This medicine is available with or without a doctor's order  NSAIDs can cause stomach bleeding or kidney problems in certain people  If you take blood thinner medicine, always ask your obstetrician if NSAIDs are safe for you  Always read the medicine label and follow directions  · Take your medicine as directed  Contact your obstetrician (OB) if you think your medicine is not helping or if you have side effects  Tell him if you are allergic to any medicine  Keep a list of the medicines, vitamins, and herbs you take  Include the amounts, and when and why you take them  Bring the list or the pill bottles to follow-up visits  Carry your medicine list with you in case of an emergency  Follow up with your OB as directed: You may need to return to have your stitches or staples removed  Write down your questions so you remember to ask them during your visits    Wound care:  Carefully wash your wound with soap and water every day  Keep your wound clean and dry  Wear loose, comfortable clothes that do not rub against your wound  Ask your OB about bathing and showering  Drink plenty of liquids: You can lower your risk for a blood clot if you drink plenty of liquids  Ask how much liquid to drink each day and which liquids are best for you  Limit activity until you have fully recovered from surgery:   · Ask when it is safe for you to drive, walk up stairs, lift heavy objects, and have sex  · Ask when it is okay to exercise, and what types of exercise to do  Start slowly and do more as you get stronger  Contact your OB if:   · You have heavy vaginal bleeding that fills 1 or more sanitary pads in 1 hour  · You have a fever  · Your incision is swollen, red, or draining pus  · You have questions or concerns about yourself or your baby  Seek care immediately or call 911 if:   · Blood soaks through your bandage  · Your stitches come apart  · You feel lightheaded, short of breath, and have chest pain  · You cough up blood  · Your arm or leg feels warm, tender, and painful  It may look swollen and red  © 2014 [address] is for End User's use only and may not be sold, redistributed or otherwise used for commercial purposes  All illustrations and images included in CareNotes® are the copyrighted property of A D A M [employer] Inc  or Prem Burgos  The above information is an  only  It is not intended as medical advice for individual conditions or treatments  Talk to your doctor, nurse or pharmacist before following any medical regimen to see if it is safe and effective for you  Self Care After Delivery   AMBULATORY CARE:   The postpartum period  is the period of time from delivery to about 6 weeks  During this time you may experience many physical and emotional changes   It is important to understand what is normal and when you need to call your healthcare provider  It is also important to know how to care for yourself during this time  Call 911 for any of the following:   · You soak through 1 pad in 15 minutes, have blurry vision, clammy or pale skin, and feel faint  · You faint or lose consciousness  · Your heart is beating faster than normal      · You have trouble breathing  · You cough up blood  Seek care immediately if:   · You soak through 1 or more pads in an hour, or pass blood clots larger than a quarter from your vagina  · Your leg is painful, red, and larger than normal      · You have severe abdominal pain  · You have a bad headache or changes in your vision  · Your episiotomy or C section incision is red, swollen, bleeding, or draining pus  · Your incision comes apart  · You see or hear things that are not there, or have thoughts of harming yourself or your baby  Contact your obstetrician or midwife if:   · You have a fever  · You have new or worsening pain in your abdomen or vagina  · You continue to have the baby blues 10 days after you deliver  · You have trouble sleeping  · You have foul-smelling discharge from your vagina  · You have pain or burning when you urinate  · You do not have a bowel movement for 3 days or more  · You have nausea or are vomiting  · You have hard lumps or red streaks over your breasts  · You have cracked nipples or bleed from your nipples  · You have questions or concerns about your condition or care  Physical changes: The following are normal changes after you give birth:  · Pain in the area between your anus and vagina    · Breast pain    · Constipation or hemorrhoids    · Hot or cold flashes    · Vaginal bleeding or discharge    · Mild to moderate abdominal cramping    · Difficulty controlling bowel movements or urine  Emotional changes:   The following are symptoms of the baby blues, or normal emotions after you give birth  The changes in your emotions may be caused by a drop in hormone levels after you deliver  If these symptoms last longer than 1 to 2 weeks after you give birth, you may have postpartum depression  · Feeling irritable    · Feeling sad    · Crying for no reason    · Feeling anxious  Breast care for nursing mothers: You may have sore breasts for 3 to 6 days after you give birth  This happens as your milk begins to fill your breasts  You may also have sore breasts if you do not breastfeed frequently  Do the following to care for your breasts:  · Apply a moist, warm, compress to your breast as directed  This may help soothe your breasts  Make sure the washcloth is not too hot before you apply it to your breast      · Nurse your baby or pump your milk frequently  This may prevent clogged milk ducts  Ask your healthcare provider how often to nurse or pump  · Massage your breasts as directed  This may help increase your milk flow  Gently rub your breasts in a circular motion before you breastfeed  You may need to gently squeeze your breast or nipple to help release milk  You can also use a breast pump to help release milk from your breast      · Wash your breasts with warm water only  Do not put soap on your nipples  Soap may cause your nipples to become dry  · Apply lanolin cream to your nipples as directed  Lanolin cream may add moisture to your skin and prevent nipple dryness  Always  wash off lanolin cream with warm water before you breastfeed  · Place pads in your bra  Your nipples may leak milk when you are not breastfeeding  You can place pads inside of your bra to help prevent leaking onto your clothing  Ask your healthcare provider where to purchase bra pads  · Get breastfeeding support if needed  There are healthcare providers who can answer questions about breastfeeding and provide you with support   Ask your healthcare provider who you can contact if you need breastfeeding support  Breast care for non-nursing mothers:  Milk will fill your breasts even if you bottle feed your baby  Do the following to help stop your milk from filling your breasts and causing pain:  · Wear a bra with support at all times  A sports bra or a tight-fitting bra will help stop your milk from coming in  · Apply ice on each breast for 15 to 20 minutes every hour or as directed  Use an ice pack, or put crushed ice in a plastic bag  Cover it with a towel  Ice helps your milk ducts shrink  · Keep your breasts away from warm water  Warm water will make it easier for milk to fill your breasts  Stand with your breasts away from warm water in the shower  · Limit how much you touch your breasts  This will prevent them from filling with milk  Perineum care: Your perineum is the area between your rectum and vagina  It is normal to have swelling and pain in this area after you give birth  If you had an episiotomy, your healthcare provider may give you special instructions  · Clean your perineum after you use the bathroom  This may prevent infection and help with healing  Use a spray bottle with warm water to clean your perineum  You may also gently spray warm water against your perineum when you urinate  Always wipe front to back  · Take a sitz bath as directed  A sitz bath may help relieve swelling and pain  Fill your bath tub or bucket with water up to your hips and sit in the water  Use cold water for 2 days after you deliver  Then use warm water  Ask your healthcare provider for more information about a sitz bath  · Apply ice packs for the first 24 hours or as directed  Use a plastic glove filled with ice or buy an ice pack  Wrap the ice pack or plastic glove in a small towel or wash cloth  Place the ice pack on your perineum for 20 minutes at a time  · Sit on a donut-shaped pillow  This may relieve pressure on your perineum when you sit       · Use wipes with medicine or take pills as directed  Your healthcare provider may tell you to use witch hazel pads  You can place witch hazel pads in the refrigerator before you apply them to your perineum  He may also tell you to take NSAIDs  Ask your healthcare provider how often to take pills or use wipes with medicine  · Do not go swimming or take tub baths for 4 to 6 weeks or as directed  This will help prevent an infection in your vagina or uterus  Bowel and bladder care: It may take 3 to 5 days to have a bowel movement after you deliver your baby  You can do the following to prevent or manage constipation, and get control of your bowel or bladder:  · Take stool softeners as directed  A stool softener is medicine that will make your bowel movements softer  This may prevent or relieve constipation  A stool softener may also make bowel movements less painful  · Drink plenty of liquids  Ask how much liquid to drink each day and which liquids are best for you  Liquids may help prevent constipation  · Eat foods high in fiber  Examples include fruits, vegetables, grains, beans, and lentils  Ask your healthcare provider how much fiber you need each day  Fiber may prevent constipation  · Do Kegel exercises as directed  Kegel exercises will help strengthen the muscles that control bowel movements and urination  Ask your healthcare provider for more information on Kegel exercises  · Apply cold compresses or medicine to hemorrhoids as directed  This may relieve swelling and pain  Your healthcare provider may tell you to apply ice or wipes with medicine to your hemorrhoids  He may also tell you to use a sitz bath  Ask your healthcare for more information on how to manage hemorrhoids  Nutrition:  Good nutrition is important in the postpartum period  It will help you return to a healthy weight, increase your energy levels, and prevent constipation   It will also help you get enough nutrients and calories if you are going to breastfeed your baby  · Eat a variety of healthy foods  Healthy foods include fruits, vegetables, whole-grain breads, low-fat dairy products, beans, lean meats, and fish  You may need 500 to 700 additional calories each day if you breastfeed your baby  You may also need extra protein  · Limit foods with added sugar and high amounts of fat  These foods are high in calories and low in healthy nutrients  Read food labels so you know how much sugar and fat is in the food you want to eat  · Drink 8 to 10 glasses of water per day  Water will help you make plenty of milk for your baby  It will also help prevent constipation  Drink a glass of water every time you breastfeed your baby  · Take vitamins as directed  Ask your healthcare provider what vitamins you need  · Limit caffeine and alcohol if you are breastfeeding  Caffeine and alcohol can get into your breast milk  Caffeine and alcohol can make your baby fussy  They can also interfere with your baby's sleep  Ask your healthcare provider if you can drink alcohol or caffeine  Rest and sleep: You may feel very tired in the postpartum period  Enough sleep will help you heal and give you energy to care for your baby  The following may help you get sleep and rest:  · Nap when your baby naps  Your baby may nap several times during the day  Get rest during this time  · Limit visitors  Many people may want to see you and your baby  Ask friends or family to visit on different days  This will give you time to rest      · Do not plan too much for one day  Put off household chores so that you have time to rest  Gradually do more each day  · Ask for help from family, friends, or neighbors  Ask them to help you with laundry, cleaning, or errands  Also ask someone to watch the baby while you take a nap or relax  Ask your partner to help with the care of your baby   Pump some of your breast milk so your partner can feed your baby during the night   Exercise after delivery:  Wait until your healthcare provider says it is okay to exercise  Exercise can help you lose weight, increase your energy levels, and manage your mood  It can also prevent constipation and blood clots  Start with gentle exercises such as walking  Do more as you have more energy  You may need to avoid abdominal exercises for 1 to 2 weeks after you deliver  Talk to your healthcare provider about an exercise plan that is right for you  Sexual activity after delivery:   · Do not have sex until your healthcare provider says it is okay  You may need to wait 4 to 6 weeks before you have sex  This may prevent infection and allow time to heal      · Your menstrual cycle may begin as soon as 3 weeks after you deliver  Your period may be delayed if you breastfeed your baby  You can become pregnant before you get your first postpartum period  Talk to your healthcare provider about birth control that is right for you  Some types of birth control are not safe during breastfeeding  For support and more information:  Join a support group for new mothers  Ask for help from family and friends with chores, errands, and care of your baby  · Office of Women's Health,  Department of Health and Human Services  [address], [address] [place] [address]  [address], [id number] [place] [address]  Phone: 5- 342 - [phone]  Web Address: www womenshealth gov  · [facility] Postpartum [address] [facility] , [address] [place] [address]  [address] [facility] , [address] [place] [address]  Web Address: [employer] be  [email or web address]/pregnancy/postpartum-care  aspx  Follow up with your obstetrician or midwife as directed: You will need to follow up with your healthcare provider in 2 to 6 weeks after delivery  Write down your questions so you remember to ask them at your visits     © 2017 [address] Information is for End User's use only and may not be sold, redistributed or otherwise used for commercial purposes  All illustrations and images included in CareNotes® are the copyrighted property of [employer]  or Prem Burgos  The above information is an  only  It is not intended as medical advice for individual conditions or treatments  Talk to your doctor, nurse or pharmacist before following any medical regimen to see if it is safe and effective for you

## 2020-03-19 NOTE — DISCHARGE SUMMARY
CS Discharge Summary - Hamlet Young 47 y o  female MRN: [id number]    Unit/Bed#: -01 Encounter: [id number]    Admission Date: 3/17/2020     Discharge Date: 3/21/2020    Admitting Diagnosis:   Patient Active Problem List   Diagnosis    High-risk pregnancy, primigravida of advanced maternal age   Jeremy Hinton Pregnancy resulting from assisted reproductive technology, antepartum    Insulin controlled gestational diabetes mellitus (GDM) in third trimester    Endocervical polyp    38 weeks gestation of pregnancy    Prenatal care, subsequent pregnancy, third trimester     Discharge Diagnosis:   Same, delivered    Procedures:   primary  section, low transverse incision    Admitting Attending: Dr Ash Pak  Delivery Attending: Dr Donato Marie  Discharge Attending: Dr Ian Torres service: none  Consult attending: none    Hospital Course:     Hamlet Young is a 47 y o  Yamila Balling who was admitted for induction of labor for A2GDM  She received three doses of vaginal cytotec, and made minimal change  She developed a persistent category 2 tracing while remote from delivery  Plan was made to proceed with urgent  section  She then underwent an uncomplicated  section delivery and delivered a viable female  at 1 on 3/18/20  APGARS were 1, 8 at 1 and 5 minutes, respectively   weighed 6lb 14 2oz   was then transferred to  nursery  Patient tolerated the procedure well and was transferred to recovery in stable condition  The patient's post partum course was unremarkable    Preoperative hemoglobin was 10 7, postoperative was 10  Her postoperative pain was well controlled with oral analgesics  On day of discharge, she was ambulating and able to reasonably perform all ADLs  She was voiding and had appropriate bowel function  Pain was well controlled  She was discharged home on post-operative day #3 without complications   Patient was instructed to follow up with her OB as an outpatient and was given appropriate warnings to call provider if she develops signs of infection or uncontrolled pain  Complications:   None    Condition at discharge:   good     Provisions for Follow-Up Care:  See after visit summary for information related to follow-up care and any pertinent home health orders  Disposition:   Home    Planned Readmission:   No    Discharge Medications:   Please see after visit summary for full list of discharge medications    Discharge instructions :   -Do not place anything (no partner, tampons or douche) in your vagina for 6 weeks  -You may walk for exercise for the first 6 weeks then gradually return to your usual activities    -Please do not drive for 1 week if you have no stitches and for 2 weeks if you have stitches or underwent a  delivery     -You may take baths or shower per your preference    -Please look at your bust (breasts) in the mirror daily and call provider for redness or tenderness or increased warmth  - If you have had a  please look at your incision daily as well and call provider for increasing redness or steady drainage from the incision    -Please call your provider if temperature > 100 4*F or 38* C, worsening pain or a foul discharge

## 2020-03-20 PROCEDURE — 99024 POSTOP FOLLOW-UP VISIT: CPT | Performed by: STUDENT IN AN ORGANIZED HEALTH CARE EDUCATION/TRAINING PROGRAM

## 2020-03-20 RX ORDER — OXYCODONE HYDROCHLORIDE 10 MG/1
10 TABLET ORAL EVERY 4 HOURS PRN
Status: DISCONTINUED | OUTPATIENT
Start: 2020-03-20 | End: 2020-03-21 | Stop reason: HOSPADM

## 2020-03-20 RX ORDER — OXYCODONE HYDROCHLORIDE 5 MG/1
5 TABLET ORAL EVERY 4 HOURS PRN
Status: DISCONTINUED | OUTPATIENT
Start: 2020-03-20 | End: 2020-03-21 | Stop reason: HOSPADM

## 2020-03-20 RX ADMIN — DOCUSATE SODIUM 100 MG: 100 CAPSULE, LIQUID FILLED ORAL at 17:17

## 2020-03-20 RX ADMIN — FAMOTIDINE 10 MG: 20 TABLET ORAL at 17:17

## 2020-03-20 RX ADMIN — FAMOTIDINE 10 MG: 20 TABLET ORAL at 09:40

## 2020-03-20 RX ADMIN — ACETAMINOPHEN 650 MG: 325 TABLET, FILM COATED ORAL at 18:45

## 2020-03-20 RX ADMIN — Medication 1 TABLET: at 09:38

## 2020-03-20 RX ADMIN — OXYCODONE HYDROCHLORIDE AND ACETAMINOPHEN 1 TABLET: 5; 325 TABLET ORAL at 06:02

## 2020-03-20 RX ADMIN — ENOXAPARIN SODIUM 40 MG: 40 INJECTION SUBCUTANEOUS at 09:38

## 2020-03-20 RX ADMIN — DOCUSATE SODIUM 100 MG: 100 CAPSULE, LIQUID FILLED ORAL at 09:38

## 2020-03-20 RX ADMIN — OXYCODONE HYDROCHLORIDE 5 MG: 5 TABLET ORAL at 21:25

## 2020-03-20 RX ADMIN — ACETAMINOPHEN 650 MG: 325 TABLET, FILM COATED ORAL at 13:52

## 2020-03-20 NOTE — PROGRESS NOTES
Progress Note - OB/GYN   Margaret Whatley 47 y o  female MRN: [id number]  Unit/Bed#:  329-01 Encounter: [id number]    Assessment:  Post partum Day #2 s/p 1LTCS, stable, baby in room  Blood pressures: 120s-130s/50s-70s      Plan:  1) Post op care   - QBL 209ml, Hgb 10 7 -> 10    2) DVT ppx   - Lovenox 40mg qd    3) Continue routine post partum care   - Encourage ambulation   - Encourage breastfeeding   - Anticipate discharge home on POD#3     Subjective/Objective   Chief Complaint:     Post delivery  Patient is doing well  Lochia WNL  Pain well controlled  Subjective:     Pain: yes, cramping, improved with meds  Tolerating PO: yes  Voiding: yes  Flatus: no  BM: no  Ambulating: yes  Breastfeeding:  yes  Chest pain: no  Shortness of breath: no  Leg pain: no  Lochia: WNL    Objective:     Vitals: /76 (BP Location: Left arm)   Pulse 71   Temp 98 °F (36 7 °C) (Temporal)   Resp 18   Ht 5' 2" (1 575 m)   Wt 68 9 kg (152 lb)   LMP 05/10/2019 (Exact Date)   SpO2 97%   Breastfeeding Yes   BMI 27 80 kg/m²       Intake/Output Summary (Last 24 hours) at 3/20/2020 0748  Last data filed at 3/19/2020 1430  Gross per 24 hour   Intake --   Output 1000 ml   Net -1000 ml       Lab Results   Component Value Date    WBC 10 08 03/19/2020    HGB 10 0 (L) 03/19/2020    HCT 32 2 (L) 03/19/2020    MCV 73 (L) 03/19/2020     03/19/2020       Physical Exam:     Gen: AAOx3, NAD  CV: RRR  Lungs: CTA b/l  Abd: Soft, non-tender, non-distended, no rebound or guarding  Uterine fundus firm and non-tender, 2 cm below the umbilicus     Ext: Non tender    Incision: clean/dry/intact      Raphael Rodriguez MD  OB/GYN PGY-2  3/20/2020  7:48 AM

## 2020-03-20 NOTE — PLAN OF CARE
Problem: PAIN - ADULT  Goal: Verbalizes/displays adequate comfort level or baseline comfort level  Description  Interventions:  - Encourage patient to monitor pain and request assistance  - Assess pain using appropriate pain scale  - Administer analgesics based on type and severity of pain and evaluate response  - Implement non-pharmacological measures as appropriate and evaluate response  - Consider cultural and social influences on pain and pain management  - Notify physician/advanced practitioner if interventions unsuccessful or patient reports new pain  Outcome: Progressing     Problem: INFECTION - ADULT  Goal: Absence or prevention of progression during hospitalization  Description  INTERVENTIONS:  - Assess and monitor for signs and symptoms of infection  - Monitor lab/diagnostic results  - Monitor all insertion sites, i e  indwelling lines, tubes, and drains  - Monitor endotracheal if appropriate and nasal secretions for changes in amount and color  - [place] appropriate cooling/warming therapies per order  - Administer medications as ordered  - Instruct and encourage patient and family to use good hand hygiene technique  - Identify and instruct in appropriate isolation precautions for identified infection/condition  Outcome: Progressing     Problem: SAFETY ADULT  Goal: Patient will remain free of falls  Description  INTERVENTIONS:  - Assess patient frequently for physical needs  -  Identify cognitive and physical deficits and behaviors that affect risk of falls    -  [place] fall precautions as indicated by assessment   - Educate patient/family on patient safety including physical limitations  - Instruct patient to call for assistance with activity based on assessment  - Modify environment to reduce risk of injury  - Consider OT/PT consult to assist with strengthening/mobility  Outcome: Progressing  Goal: Maintain or return to baseline ADL function  Description  INTERVENTIONS:  -  Assess patient's ability to carry out ADLs; assess patient's baseline for ADL function and identify physical deficits which impact ability to perform ADLs (bathing, care of mouth/teeth, toileting, grooming, dressing, etc )  - Assess/evaluate cause of self-care deficits   - Assess range of motion  - Assess patient's mobility; develop plan if impaired  - Assess patient's need for assistive devices and provide as appropriate  - Encourage maximum independence but intervene and supervise when necessary  - Involve family in performance of ADLs  - Assess for home care needs following discharge   - Consider OT consult to assist with ADL evaluation and planning for discharge  - Provide patient education as appropriate  Outcome: Progressing  Goal: Maintain or return mobility status to optimal level  Description  INTERVENTIONS:  - Assess patient's baseline mobility status (ambulation, transfers, stairs, etc )    - Identify cognitive and physical deficits and behaviors that affect mobility  - Identify mobility aids required to assist with transfers and/or ambulation (gait belt, sit-to-stand, lift, walker, cane, etc )  - [place] fall precautions as indicated by assessment  - Record patient progress and toleration of activity level on Mobility SBAR; progress patient to next Phase/Stage  - Instruct patient to call for assistance with activity based on assessment  - Consider rehabilitation consult to assist with strengthening/weightbearing, etc   Outcome: Progressing     Problem: DISCHARGE PLANNING  Goal: Discharge to home or other facility with appropriate resources  Description  INTERVENTIONS:  - Identify barriers to discharge w/patient and caregiver  - Arrange for needed discharge resources and transportation as appropriate  - Identify discharge learning needs (meds, wound care, etc )  - Arrange for interpretive services to assist at discharge as needed  - Refer to Case Management Department for coordinating discharge planning if the patient needs post-hospital services based on physician/advanced practitioner order or complex needs related to functional status, cognitive ability, or social support system  Outcome: Progressing     Problem: POSTPARTUM  Goal: Experiences normal postpartum course  Description  INTERVENTIONS:  - Monitor maternal vital signs  - Assess uterine involution and lochia  Outcome: Progressing  Goal: Appropriate maternal -  bonding  Description  INTERVENTIONS:  - Identify family support  - Assess for appropriate maternal/infant bonding   -Encourage maternal/infant bonding opportunities  - Referral to  or  as needed  Outcome: Progressing  Goal: Establishment of infant feeding pattern  Description  INTERVENTIONS:  - Assess breast/bottle feeding  - Refer to lactation as needed  Outcome: Progressing  Goal: Incision(s), wounds(s) or drain site(s) healing without S/S of infection  Description  INTERVENTIONS  - Assess and document risk factors for skin impairment   - Assess and document dressing, incision, wound bed, drain sites and surrounding tissue  - Consider nutrition services referral as needed  - Oral mucous membranes remain intact  - Provide patient/ family education  Outcome: Progressing

## 2020-03-21 VITALS
SYSTOLIC BLOOD PRESSURE: 138 MMHG | HEIGHT: 62 IN | OXYGEN SATURATION: 96 % | DIASTOLIC BLOOD PRESSURE: 74 MMHG | TEMPERATURE: 99 F | RESPIRATION RATE: 18 BRPM | WEIGHT: 152 LBS | BODY MASS INDEX: 27.97 KG/M2 | HEART RATE: 72 BPM

## 2020-03-21 PROBLEM — Z98.891 S/P CESAREAN SECTION: Status: ACTIVE | Noted: 2020-03-21

## 2020-03-21 PROCEDURE — 99024 POSTOP FOLLOW-UP VISIT: CPT | Performed by: STUDENT IN AN ORGANIZED HEALTH CARE EDUCATION/TRAINING PROGRAM

## 2020-03-21 RX ORDER — DOCUSATE SODIUM 100 MG/1
100 CAPSULE, LIQUID FILLED ORAL 2 TIMES DAILY
Qty: 180 EACH | Refills: 0 | Status: SHIPPED | OUTPATIENT
Start: 2020-03-21 | End: 2021-09-15 | Stop reason: ALTCHOICE

## 2020-03-21 RX ORDER — IBUPROFEN 800 MG/1
800 TABLET ORAL EVERY 8 HOURS PRN
Qty: 30 TABLET | Refills: 0 | Status: SHIPPED | OUTPATIENT
Start: 2020-03-21 | End: 2021-06-10 | Stop reason: ALTCHOICE

## 2020-03-21 RX ORDER — ACETAMINOPHEN 325 MG/1
650 TABLET ORAL EVERY 4 HOURS PRN
Qty: 30 TABLET | Refills: 0 | Status: SHIPPED | OUTPATIENT
Start: 2020-03-21 | End: 2021-06-10 | Stop reason: ALTCHOICE

## 2020-03-21 RX ADMIN — ACETAMINOPHEN 650 MG: 325 TABLET, FILM COATED ORAL at 08:25

## 2020-03-21 RX ADMIN — Medication 1 TABLET: at 08:26

## 2020-03-21 RX ADMIN — ENOXAPARIN SODIUM 40 MG: 40 INJECTION SUBCUTANEOUS at 08:26

## 2020-03-21 RX ADMIN — DOCUSATE SODIUM 100 MG: 100 CAPSULE, LIQUID FILLED ORAL at 08:27

## 2020-03-21 RX ADMIN — ACETAMINOPHEN 650 MG: 325 TABLET, FILM COATED ORAL at 13:05

## 2020-03-21 NOTE — PROGRESS NOTES
Progress Note - OB/GYN   Johan Hill 47 y o  female MRN: [id number]  Unit/Bed#: -01 Encounter: [id number]    Assessment:  Post partum Day #3 s/p 1LTCS, stable, baby in room      Plan:  1) Post op care   - QBL 209ml, Hgb 10 7 -> 10    2) DVT ppx   - Lovenox 40mg qd    3) Continue routine post partum care   - Encourage ambulation   - Encourage breastfeeding   - Anticipate discharge home today    Subjective/Objective   Chief Complaint:     Post delivery  Patient is doing well  Lochia WNL  Pain well controlled  Subjective:     Pain: yes, cramping, improved with meds  Tolerating PO: yes  Voiding: yes  Flatus: no  BM: no  Ambulating: yes  Breastfeeding:  yes  Chest pain: no  Shortness of breath: no  Leg pain: no  Lochia: WNL    Objective:     Vitals: /68 (BP Location: Right arm)   Pulse 71   Temp 98 9 °F (37 2 °C) (Temporal)   Resp 18   Ht 5' 2" (1 575 m)   Wt 68 9 kg (152 lb)   LMP 05/10/2019 (Exact Date)   SpO2 97%   Breastfeeding Yes   BMI 27 80 kg/m²     No intake or output data in the 24 hours ending 03/21/20 0357    Lab Results   Component Value Date    WBC 10 08 03/19/2020    HGB 10 0 (L) 03/19/2020    HCT 32 2 (L) 03/19/2020    MCV 73 (L) 03/19/2020     03/19/2020       Physical Exam:     Gen: AAOx3, NAD  CV: RRR  Lungs: CTA b/l  Abd: Soft, non-tender, non-distended, no rebound or guarding  Uterine fundus firm and non-tender, 2 cm below the umbilicus     Ext: Non tender    Incision: clean/dry/intact      Mayelin Cheng MD  OB/GYN PGY-2  3/21/2020  3:57 AM

## 2020-03-21 NOTE — PLAN OF CARE
Problem: PAIN - ADULT  Goal: Verbalizes/displays adequate comfort level or baseline comfort level  Description  Interventions:  - Encourage patient to monitor pain and request assistance  - Assess pain using appropriate pain scale  - Administer analgesics based on type and severity of pain and evaluate response  - Implement non-pharmacological measures as appropriate and evaluate response  - Consider cultural and social influences on pain and pain management  - Notify physician/advanced practitioner if interventions unsuccessful or patient reports new pain  Outcome: Progressing     Problem: INFECTION - ADULT  Goal: Absence or prevention of progression during hospitalization  Description  INTERVENTIONS:  - Assess and monitor for signs and symptoms of infection  - Monitor lab/diagnostic results  - Monitor all insertion sites, i e  indwelling lines, tubes, and drains  - Monitor endotracheal if appropriate and nasal secretions for changes in amount and color  - [place] appropriate cooling/warming therapies per order  - Administer medications as ordered  - Instruct and encourage patient and family to use good hand hygiene technique  - Identify and instruct in appropriate isolation precautions for identified infection/condition  Outcome: Progressing     Problem: SAFETY ADULT  Goal: Patient will remain free of falls  Description  INTERVENTIONS:  - Assess patient frequently for physical needs  -  Identify cognitive and physical deficits and behaviors that affect risk of falls    -  [place] fall precautions as indicated by assessment   - Educate patient/family on patient safety including physical limitations  - Instruct patient to call for assistance with activity based on assessment  - Modify environment to reduce risk of injury  - Consider OT/PT consult to assist with strengthening/mobility  Outcome: Progressing  Goal: Maintain or return to baseline ADL function  Description  INTERVENTIONS:  -  Assess patient's ability to carry out ADLs; assess patient's baseline for ADL function and identify physical deficits which impact ability to perform ADLs (bathing, care of mouth/teeth, toileting, grooming, dressing, etc )  - Assess/evaluate cause of self-care deficits   - Assess range of motion  - Assess patient's mobility; develop plan if impaired  - Assess patient's need for assistive devices and provide as appropriate  - Encourage maximum independence but intervene and supervise when necessary  - Involve family in performance of ADLs  - Assess for home care needs following discharge   - Consider OT consult to assist with ADL evaluation and planning for discharge  - Provide patient education as appropriate  Outcome: Progressing  Goal: Maintain or return mobility status to optimal level  Description  INTERVENTIONS:  - Assess patient's baseline mobility status (ambulation, transfers, stairs, etc )    - Identify cognitive and physical deficits and behaviors that affect mobility  - Identify mobility aids required to assist with transfers and/or ambulation (gait belt, sit-to-stand, lift, walker, cane, etc )  - [place] fall precautions as indicated by assessment  - Record patient progress and toleration of activity level on Mobility SBAR; progress patient to next Phase/Stage  - Instruct patient to call for assistance with activity based on assessment  - Consider rehabilitation consult to assist with strengthening/weightbearing, etc   Outcome: Progressing     Problem: DISCHARGE PLANNING  Goal: Discharge to home or other facility with appropriate resources  Description  INTERVENTIONS:  - Identify barriers to discharge w/patient and caregiver  - Arrange for needed discharge resources and transportation as appropriate  - Identify discharge learning needs (meds, wound care, etc )  - Arrange for interpretive services to assist at discharge as needed  - Refer to Case Management Department for coordinating discharge planning if the patient needs post-hospital services based on physician/advanced practitioner order or complex needs related to functional status, cognitive ability, or social support system  Outcome: Progressing     Problem: POSTPARTUM  Goal: Experiences normal postpartum course  Description  INTERVENTIONS:  - Monitor maternal vital signs  - Assess uterine involution and lochia  Outcome: Progressing  Goal: Appropriate maternal -  bonding  Description  INTERVENTIONS:  - Identify family support  - Assess for appropriate maternal/infant bonding   -Encourage maternal/infant bonding opportunities  - Referral to  or  as needed  Outcome: Progressing  Goal: Establishment of infant feeding pattern  Description  INTERVENTIONS:  - Assess breast/bottle feeding  - Refer to lactation as needed  Outcome: Progressing  Goal: Incision(s), wounds(s) or drain site(s) healing without S/S of infection  Description  INTERVENTIONS  - Assess and document risk factors for skin impairment   - Assess and document dressing, incision, wound bed, drain sites and surrounding tissue  - Consider nutrition services referral as needed  - Oral mucous membranes remain intact  - Provide patient/ family education  Outcome: Progressing     Problem: ALTERATION IN THE BREAST  Goal: Optimize infant feeding at the breast  Description  INTERVENTIONS:  - Latch, breast and nipple assessment  - Assess prior breast feeding history  - Hand expression of breast milk  - For cracked, bleeding and or sore nipples reassess latch, treat damaged nipple  -Educate mother on feeding cues  -Positioning/latch techniques  Outcome: Progressing     Problem: INADEQUATE LATCH, SUCK OR SWALLOW  Goal: Demonstrate ability to latch and sustain latch, audible swallowing and satiety  Description  INTERVENTIONS:  - Assess oral anatomy, notify Physician/AP for abnormal findings  - Establish milk expression  - Maximize feeding opportunity (skin to skin, behavioral state)  - Position/latch techniques  - Discourage use of pacifier-artificial nipple  - Mechanical pumping  - Nipple Shield  - Supplemental formula feeding (Physician/AP order)  - Alternative feeding method  Outcome: Progressing

## 2020-03-21 NOTE — PLAN OF CARE
Problem: PAIN - ADULT  Goal: Verbalizes/displays adequate comfort level or baseline comfort level  Description  Interventions:  - Encourage patient to monitor pain and request assistance  - Assess pain using appropriate pain scale  - Administer analgesics based on type and severity of pain and evaluate response  - Implement non-pharmacological measures as appropriate and evaluate response  - Consider cultural and social influences on pain and pain management  - Notify physician/advanced practitioner if interventions unsuccessful or patient reports new pain  3/21/2020 1256 by Alfonzo Diop RN  Outcome: Completed  3/21/2020 0948 by Alfonzo Diop RN  Outcome: Progressing     Problem: INFECTION - ADULT  Goal: Absence or prevention of progression during hospitalization  Description  INTERVENTIONS:  - Assess and monitor for signs and symptoms of infection  - Monitor lab/diagnostic results  - Monitor all insertion sites, i e  indwelling lines, tubes, and drains  - Monitor endotracheal if appropriate and nasal secretions for changes in amount and color  - [place] appropriate cooling/warming therapies per order  - Administer medications as ordered  - Instruct and encourage patient and family to use good hand hygiene technique  - Identify and instruct in appropriate isolation precautions for identified infection/condition  3/21/2020 1256 by Alfonzo Diop RN  Outcome: Completed  3/21/2020 0948 by Alfonzo Diop RN  Outcome: Progressing     Problem: SAFETY ADULT  Goal: Patient will remain free of falls  Description  INTERVENTIONS:  - Assess patient frequently for physical needs  -  Identify cognitive and physical deficits and behaviors that affect risk of falls    -  [place] fall precautions as indicated by assessment   - Educate patient/family on patient safety including physical limitations  - Instruct patient to call for assistance with activity based on assessment  - Modify environment to reduce risk of injury  - Consider OT/PT consult to assist with strengthening/mobility  3/21/2020 1256 by Dirk Dandy, RN  Outcome: Completed  3/21/2020 0948 by Dirk Dandy, RN  Outcome: Progressing  Goal: Maintain or return to baseline ADL function  Description  INTERVENTIONS:  -  Assess patient's ability to carry out ADLs; assess patient's baseline for ADL function and identify physical deficits which impact ability to perform ADLs (bathing, care of mouth/teeth, toileting, grooming, dressing, etc )  - Assess/evaluate cause of self-care deficits   - Assess range of motion  - Assess patient's mobility; develop plan if impaired  - Assess patient's need for assistive devices and provide as appropriate  - Encourage maximum independence but intervene and supervise when necessary  - Involve family in performance of ADLs  - Assess for home care needs following discharge   - Consider OT consult to assist with ADL evaluation and planning for discharge  - Provide patient education as appropriate  3/21/2020 1256 by Dirk Dandy, RN  Outcome: Completed  3/21/2020 0948 by Dirk Dandy, RN  Outcome: Progressing  Goal: Maintain or return mobility status to optimal level  Description  INTERVENTIONS:  - Assess patient's baseline mobility status (ambulation, transfers, stairs, etc )    - Identify cognitive and physical deficits and behaviors that affect mobility  - Identify mobility aids required to assist with transfers and/or ambulation (gait belt, sit-to-stand, lift, walker, cane, etc )  - [place] fall precautions as indicated by assessment  - Record patient progress and toleration of activity level on Mobility SBAR; progress patient to next Phase/Stage  - Instruct patient to call for assistance with activity based on assessment  - Consider rehabilitation consult to assist with strengthening/weightbearing, etc   3/21/2020 1256 by Dirk Dandy, RN  Outcome: Completed  3/21/2020 0948 by Dirk Dandy, RN  Outcome: Progressing     Problem: DISCHARGE PLANNING  Goal: Discharge to home or other facility with appropriate resources  Description  INTERVENTIONS:  - Identify barriers to discharge w/patient and caregiver  - Arrange for needed discharge resources and transportation as appropriate  - Identify discharge learning needs (meds, wound care, etc )  - Arrange for interpretive services to assist at discharge as needed  - Refer to Case Management Department for coordinating discharge planning if the patient needs post-hospital services based on physician/advanced practitioner order or complex needs related to functional status, cognitive ability, or social support system  3/21/2020 1256 by Uriel Rizvi RN  Outcome: Completed  3/21/2020 0948 by Uriel Rizvi RN  Outcome: Progressing     Problem: POSTPARTUM  Goal: Experiences normal postpartum course  Description  INTERVENTIONS:  - Monitor maternal vital signs  - Assess uterine involution and lochia  3/21/2020 1256 by Uriel Rizvi RN  Outcome: Completed  3/21/2020 0948 by Uriel Rizvi RN  Outcome: Progressing  Goal: Appropriate maternal -  bonding  Description  INTERVENTIONS:  - Identify family support  - Assess for appropriate maternal/infant bonding   -Encourage maternal/infant bonding opportunities  - Referral to  or  as needed  3/21/2020 [phone] by Uriel Rizvi RN  Outcome: Completed  3/21/2020 0948 by Uriel Rizvi RN  Outcome: Progressing  Goal: Establishment of infant feeding pattern  Description  INTERVENTIONS:  - Assess breast/bottle feeding  - Refer to lactation as needed  3/21/2020 1256 by Uriel Rizvi RN  Outcome: Completed  3/21/2020 0948 by Uriel Rizvi RN  Outcome: Progressing  Goal: Incision(s), wounds(s) or drain site(s) healing without S/S of infection  Description  INTERVENTIONS  - Assess and document risk factors for skin impairment   - Assess and document dressing, incision, wound bed, drain sites and surrounding tissue  - Consider nutrition services referral as needed  - Oral mucous membranes remain intact  - Provide patient/ family education  3/21/2020 1256 by Eveline Gallegos RN  Outcome: Completed  3/21/2020 0948 by Eveline Gallegos RN  Outcome: Progressing     Problem: ALTERATION IN THE BREAST  Goal: Optimize infant feeding at the breast  Description  INTERVENTIONS:  - Latch, breast and nipple assessment  - Assess prior breast feeding history  - Hand expression of breast milk  - For cracked, bleeding and or sore nipples reassess latch, treat damaged nipple  -Educate mother on feeding cues  -Positioning/latch techniques  3/21/2020 1256 by Eveline Gallegos RN  Outcome: Completed  3/21/2020 0948 by Eveline Gallegos RN  Outcome: Progressing     Problem: INADEQUATE LATCH, SUCK OR SWALLOW  Goal: Demonstrate ability to latch and sustain latch, audible swallowing and satiety  Description  INTERVENTIONS:  - Assess oral anatomy, notify Physician/AP for abnormal findings  - Establish milk expression  - Maximize feeding opportunity (skin to skin, behavioral state)  - Position/latch techniques  - Discourage use of pacifier-artificial nipple  - Mechanical pumping  - Nipple Shield  - Supplemental formula feeding (Physician/AP order)  - Alternative feeding method  3/21/2020 1256 by Eveline Gallegos RN  Outcome: Completed  3/21/2020 0948 by Eveline Gallegos RN  Outcome: Progressing

## 2020-03-21 NOTE — PLAN OF CARE
Problem: PAIN - ADULT  Goal: Verbalizes/displays adequate comfort level or baseline comfort level  Description  Interventions:  - Encourage patient to monitor pain and request assistance  - Assess pain using appropriate pain scale  - Administer analgesics based on type and severity of pain and evaluate response  - Implement non-pharmacological measures as appropriate and evaluate response  - Consider cultural and social influences on pain and pain management  - Notify physician/advanced practitioner if interventions unsuccessful or patient reports new pain  Outcome: Progressing     Problem: INFECTION - ADULT  Goal: Absence or prevention of progression during hospitalization  Description  INTERVENTIONS:  - Assess and monitor for signs and symptoms of infection  - Monitor lab/diagnostic results  - Monitor all insertion sites, i e  indwelling lines, tubes, and drains  - Monitor endotracheal if appropriate and nasal secretions for changes in amount and color  - [place] appropriate cooling/warming therapies per order  - Administer medications as ordered  - Instruct and encourage patient and family to use good hand hygiene technique  - Identify and instruct in appropriate isolation precautions for identified infection/condition  Outcome: Progressing     Problem: SAFETY ADULT  Goal: Patient will remain free of falls  Description  INTERVENTIONS:  - Assess patient frequently for physical needs  -  Identify cognitive and physical deficits and behaviors that affect risk of falls    -  [place] fall precautions as indicated by assessment   - Educate patient/family on patient safety including physical limitations  - Instruct patient to call for assistance with activity based on assessment  - Modify environment to reduce risk of injury  - Consider OT/PT consult to assist with strengthening/mobility  Outcome: Progressing  Goal: Maintain or return to baseline ADL function  Description  INTERVENTIONS:  -  Assess patient's ability to carry out ADLs; assess patient's baseline for ADL function and identify physical deficits which impact ability to perform ADLs (bathing, care of mouth/teeth, toileting, grooming, dressing, etc )  - Assess/evaluate cause of self-care deficits   - Assess range of motion  - Assess patient's mobility; develop plan if impaired  - Assess patient's need for assistive devices and provide as appropriate  - Encourage maximum independence but intervene and supervise when necessary  - Involve family in performance of ADLs  - Assess for home care needs following discharge   - Consider OT consult to assist with ADL evaluation and planning for discharge  - Provide patient education as appropriate  Outcome: Progressing  Goal: Maintain or return mobility status to optimal level  Description  INTERVENTIONS:  - Assess patient's baseline mobility status (ambulation, transfers, stairs, etc )    - Identify cognitive and physical deficits and behaviors that affect mobility  - Identify mobility aids required to assist with transfers and/or ambulation (gait belt, sit-to-stand, lift, walker, cane, etc )  - [place] fall precautions as indicated by assessment  - Record patient progress and toleration of activity level on Mobility SBAR; progress patient to next Phase/Stage  - Instruct patient to call for assistance with activity based on assessment  - Consider rehabilitation consult to assist with strengthening/weightbearing, etc   Outcome: Progressing     Problem: DISCHARGE PLANNING  Goal: Discharge to home or other facility with appropriate resources  Description  INTERVENTIONS:  - Identify barriers to discharge w/patient and caregiver  - Arrange for needed discharge resources and transportation as appropriate  - Identify discharge learning needs (meds, wound care, etc )  - Arrange for interpretive services to assist at discharge as needed  - Refer to Case Management Department for coordinating discharge planning if the patient needs post-hospital services based on physician/advanced practitioner order or complex needs related to functional status, cognitive ability, or social support system  Outcome: Progressing     Problem: POSTPARTUM  Goal: Experiences normal postpartum course  Description  INTERVENTIONS:  - Monitor maternal vital signs  - Assess uterine involution and lochia  Outcome: Progressing  Goal: Appropriate maternal -  bonding  Description  INTERVENTIONS:  - Identify family support  - Assess for appropriate maternal/infant bonding   -Encourage maternal/infant bonding opportunities  - Referral to  or  as needed  Outcome: Progressing  Goal: Establishment of infant feeding pattern  Description  INTERVENTIONS:  - Assess breast/bottle feeding  - Refer to lactation as needed  Outcome: Progressing  Goal: Incision(s), wounds(s) or drain site(s) healing without S/S of infection  Description  INTERVENTIONS  - Assess and document risk factors for skin impairment   - Assess and document dressing, incision, wound bed, drain sites and surrounding tissue  - Consider nutrition services referral as needed  - Oral mucous membranes remain intact  - Provide patient/ family education  Outcome: Progressing     Problem: ALTERATION IN THE BREAST  Goal: Optimize infant feeding at the breast  Description  INTERVENTIONS:  - Latch, breast and nipple assessment  - Assess prior breast feeding history  - Hand expression of breast milk  - For cracked, bleeding and or sore nipples reassess latch, treat damaged nipple  -Educate mother on feeding cues  -Positioning/latch techniques  Outcome: Progressing     Problem: INADEQUATE LATCH, SUCK OR SWALLOW  Goal: Demonstrate ability to latch and sustain latch, audible swallowing and satiety  Description  INTERVENTIONS:  - Assess oral anatomy, notify Physician/AP for abnormal findings  - Establish milk expression  - Maximize feeding opportunity (skin to skin, behavioral state)  - Position/latch techniques  - Discourage use of pacifier-artificial nipple  - Mechanical pumping  - Nipple Shield  - Supplemental formula feeding (Physician/AP order)  - Alternative feeding method  Outcome: Progressing

## 2020-03-24 ENCOUNTER — OFFICE VISIT (OUTPATIENT)
Dept: POSTPARTUM | Facility: CLINIC | Age: 50
End: 2020-03-24
Payer: COMMERCIAL

## 2020-03-24 VITALS — DIASTOLIC BLOOD PRESSURE: 82 MMHG | SYSTOLIC BLOOD PRESSURE: 112 MMHG

## 2020-03-24 DIAGNOSIS — Z71.89 ENCOUNTER FOR BREAST FEEDING COUNSELING: Primary | ICD-10-CM

## 2020-03-24 DIAGNOSIS — O92.70 LACTATION PROBLEM: ICD-10-CM

## 2020-03-24 PROCEDURE — 99404 PREV MED CNSL INDIV APPRX 60: CPT | Performed by: PEDIATRICS

## 2020-03-24 NOTE — PROGRESS NOTES
INITIAL BREAST FEEDING EVALUATION    Informant/Relationship: Keyshawn Dilsa    Discussion of General Lactation Issues: Tamar Hart has been having elevated bilirubins and is being fed formula and expressed milk at the breast with an SNS    Infant is 10days old today   History:  Fertility Problem:yes - female infertility secondary to age  Breast changes:yes - breasts got slightly fully, areola got larger and darker  : no  due to failed induction  Full term:yes - 39 weeks   labor:no  First nursing/attempt < 1 hour after birth:yes - baby latched in the recovery room  Skin to skin following delivery:yes - in the OR and the recovery room  Breast changes after delivery:yes - breasts were engorged by day 4  Saw mature milk by day 5  Rooming in (infant in room with mother with exception of procedures, eg  Circumcision: no went to the nursery the first night  Blood sugar issues:yes - for the first 24 hours  NICU stay:no  Jaundice:no  Phototherapy:no  Supplement given: (list supplement and method used as well as reason(s):Yes formula via SNS  Donor milk not offered      Past Medical History:   Diagnosis Date    Abnormal Pap smear of cervix     6 yrs ago    Female infertility     Miscarriage     , 2016    Recurrent pregnancy loss, antepartum condition or complication     x 2 7065,-Z & C; 2016-chemical pregnancy    Thalassemia     carrier    Urinary tract infection     last episode 2019    Varicella     childhood chickenpox         Current Outpatient Medications:     acetaminophen (TYLENOL) 325 mg tablet, Take 2 tablets (650 mg total) by mouth every 4 (four) hours as needed for mild pain, Disp: 30 tablet, Rfl: 0    docusate sodium (COLACE) 100 mg capsule, Take 1 capsule (100 mg total) by mouth 2 (two) times a day, Disp: 180 each, Rfl: 0    famotidine (PEPCID) 10 mg tablet, Take 10 mg by mouth 2 (two) times a day, Disp: , Rfl:     ibuprofen (MOTRIN) 800 mg tablet, Take 1 tablet (800 mg total) by mouth every 8 (eight) hours as needed for mild pain, Disp: 30 tablet, Rfl: 0    Insulin Pen Needle 31G X 5 MM MISC, Use 6 a day or as directed , Disp: 200 each, Rfl: 1    ONETOUCH DELICA LANCETS 70F MISC, Use 4 a day or as directed , Disp: 100 each, Rfl: 3    Prenatal Vit-Fe Fumarate-FA (PRENATAL VITAMIN) 28-0 8 mg, Take by mouth, Disp: , Rfl:     No Known Allergies    Social History     Substance and Sexual Activity   Drug Use No       Social History Never a smoker    Interval Breastfeeding History:    Frequency of breast feeding: Every 2 hours on a scheduled currently per Peds instructions  Does mother feel breastfeeding is effective: Yes  Does infant appear satisfied after nursing:No  Stooling pattern normal: Yes, since they began supplementing  Urinating frequently:Yes, since they began supplementing  Using shield or shells: No    Alternative/Artificial Feedings:   Bottle: Yes, a few times  Cup: No  Syringe/Finger: No           Formula Type: Similac Advance                     Amount: 15-20ml mixed with expressed milk to total 40ml            Breast Milk:                      Amount: 15-20ml mixed with formula to total 40ml            Frequency Q 2 Hr between feedings  Elimination Problems: No      Equipment:  Nipple Shield             Type: none             Size: n/a             Frequency of Use: n/a  Pump            Type: Spectra s2            Frequency of Use: Three times a day  Expresses 15-20ml per session  Shells            Type: none            Frequency of use: n/a    Equipment Problems: no    Mom:  Breast: Medium sized symmetrical breasts  Several firm areas in both  Mildly engorged accessory breast tissue in both axilla  Nipple Assessment in General: Large everted nipples bilaterally  Two supernumary nipples near the left axilla and one on the right near the axilla  Well healed surgical scar near the right axilla from removal of extra nipple    Mother's Awareness of Feeding Cues Recognizes: Yes                  Verbalizes: Yes  Support System: FOB, extended family  History of Breastfeeding: none  Changes/Stressors/Violence: Elle Abel has been concerned about Keely's health and about her supply  Concerns/Goals: Elle Abel would like to EBF    Problems with Mom: concerns with supply    Physical Exam   Constitutional: She is oriented to person, place, and time  She appears well-developed and well-nourished  HENT:   Head: Normocephalic and atraumatic  Neck: Normal range of motion  Neck supple  Cardiovascular: Normal rate, regular rhythm and normal heart sounds  Pulmonary/Chest: Effort normal and breath sounds normal    Musculoskeletal: Normal range of motion  She exhibits no edema  Neurological: She is alert and oriented to person, place, and time  Skin: Skin is warm and dry  Psychiatric: She has a normal mood and affect  Her behavior is normal  Judgment and thought content normal        Infant:  Behaviors: Alert  Color: Jaundice  Birth weight: 3125gram  Current weight: 2880gram    Problems with infant: jaundice      General Appearance:  Alert, active, no distress                            Head:  Normocephalic, AFOF, sutures opposed                            Eyes:   Conjunctiva clear, no drainage                            Ears:   Normally placed, no anomolies                           Nose:   no drainage or erythema                          Mouth:  No lesions  High palate  Tongue extends beyond the lower lip, lateralizes and tip elevates  Some good cupping of my finger and effective peristalsis noted briefly but then tongue primarily bunched up and the lower alveolar ridge was felt  Lingual frenulum is short and thin and attached near the tip of the tongue  Limits ability to elevate the tongue somewhat                     Neck:  Supple, symmetrical, trachea midline                Respiratory:  No grunting, flaring, retractions, breath sounds clear and equal Cardiovascular:  Regular rate and rhythm  No murmur  Adequate perfusion/capillary refill  Femoral pulse present                  Abdomen:    Soft, non-tender, no masses, bowel sounds present, no HSM            Genitourinary:  Normal female genitalia, anus patent                         Spine:   No abnormalities noted       Musculoskeletal:   Full range of motion         Skin/Hair/Nails:   Skin warm, dry, and intact, no rashes, jaundice to nipple line               Neurologic:   No abnormal movement, tone appropriate for gestational age    [place] Latch:  Efficiency:               Lips Flanged: Yes, after repositioning              Depth of latch: good after repositioning  Better on the left breast              Audible Swallow: Yes, improved with breasts compressions and massage              Visible Milk: Yes              Wide Open/ Asymmetrical: Yes, after repositioning              Suck Swallow Cycle: Breathing: unlabored, Coordinated: yes  Nipple Assessment after latch: Very subtle compression of the right nipple  Latch Problems: Initial latch was shallow due to positioning  Latch improved to asymmetric and a little wider after repositioning  Keely suckled rhythmically for a fair amount of time and was encouraged to continue with breast compressions  Ramona Jimenez had no pain  Keely transferred 15grams of milk during a lengthy feeding on both breasts  She was completely content after feeding  Position:  Infant's Ergonomics/Body               Body Alignment: Yes, after repositioning  Initially her body was turned away from the breast                Head Supported: Yes               Close to Mom's body/ Lifted/ Supported: Yes, after repositioning               Mom's Ergonomics/Body: Yes                           Supported:  Yes                           Sitting Back: Yes                           Brings Baby to her breast: Yes  Positioning Problems: Initially Ramona Jimenez had Keely lying in her lap with her body turned away from the breast   She positioned Keely with her mouth directly over the nipple  After discussion and demonstration of positioning, a much more effective latch was achieved  Handouts:   Paced bottle feeding, Hands on pumping, Hand expression and Latch Check List    Education:  Reviewed Latch: Demonstrated how to gently compress the breast and align the baby so that her nose is just above the nipple with her lower lip and chin touching the breast to encourage the deepest, widest, off-center latch  Reviewed Positioning for Dyad: Demonstrated how to position baby "belly to belly" with mom with her ear, shoulder and hip in alignment  Reviewed Frequency/Supply & Demand: Discussed how milk supply is established and maintained by frequently and effectively emptying the breasts  Discussed short term triple feeding to establish supply  Reviewed Alternative/Artificial Feedings: Discussed and demonstrated paced bottle feeding  Reviewed Mom/Breast care: Discussed moist heat and breast massage to relieved engorgement  Reviewed Equipment: Discussed and demonstrated the use and features of the Spectra S2 and the elements of hands on pumping  Plan:  Feed on demand but at least every 3 hours with careful attention to positioning  Supplement as needed with method of choice  Effective hands on pumping as often as desired to help increase supply  Moist heat and massage to resolve engorgement  Follow up as scheduled  Call with concerns  I have spent 90 minutes with Patient and family today in which greater than 50% of this time was spent in counseling/coordination of care regarding Patient and family education

## 2020-03-24 NOTE — PATIENT INSTRUCTIONS
Continue to offer the breast on demand, but at least every 3 hours  paying close attention to positioning for a deeper latch  Refer to the instructional video "Attaching Your Baby at the Breast" on the [address] [place] [address] website for further review  Offer both breasts at least once during each feeding  Use breast compressions and massage to help Keely get more milk  Supplement as needed with expressed milk or formula as needed with an SNS or with paced bottle feeding  Pumping in addition to feeding at the breast can help increase supply  Pump after feeding as often as you are able/willing  When pumping, Cycle your pump through stimulation and expression mode several times in a session to stimulate several let downs  Use hands on pumping and hand expression to increase your output  Maintain your pump as recommended  Warm moist compresses and breast massage prior to feeding or pumping can help resolve the firm blocked areas in your breasts  Call if these symptoms are worsening  Follow up as scheduled  Call with questions or concerns

## 2020-03-25 NOTE — PROGRESS NOTES
I have reviewed the notes, assessments, and/or procedures performed by Ruth Kate RN, IBCLC, I concur with her/his documentation of José Antonio Smart MD 03/25/20

## 2020-03-27 ENCOUNTER — OFFICE VISIT (OUTPATIENT)
Dept: POSTPARTUM | Facility: CLINIC | Age: 50
End: 2020-03-27
Payer: COMMERCIAL

## 2020-03-27 VITALS — SYSTOLIC BLOOD PRESSURE: 144 MMHG | DIASTOLIC BLOOD PRESSURE: 86 MMHG

## 2020-03-27 DIAGNOSIS — Z71.89 ENCOUNTER FOR BREAST FEEDING COUNSELING: Primary | ICD-10-CM

## 2020-03-27 DIAGNOSIS — O92.70 LACTATION PROBLEM: ICD-10-CM

## 2020-03-27 PROCEDURE — 99403 PREV MED CNSL INDIV APPRX 45: CPT | Performed by: PEDIATRICS

## 2020-03-27 NOTE — PROGRESS NOTES
BREAST FEEDING FOLLOW UP VISIT    Informant/Relationship: Shelley Cuellar    Discussion of General Lactation Issues: Feedings are more comfortable for Shelley Cuellar  She is also pumping and supplementing with expressed milk after feeding  She is also needing to supplement with some formula as well  Infant is 5days old today  Interval Breastfeeding History:    Frequency of breast feeding: Every 2-3 hours on demand  Does mother feel breastfeeding is effective: Yes  Does infant appear satisfied after nursing:No  Stooling pattern normal:Yes  Urinating frequently:Yes  Using shield or shells:No    Alternative/Artificial Feedings:   Bottle: Yes, to supplement after feeding at the breast   Cup: No  Syringe/Finger: No           Formula Type: Similac Advance                     Amount: up to 40ml when expressed milk not available (about 40% of the time)            Breast Milk:                      Amount: up to 40 ml after feeding at the breast            Frequency Q 2-3 Hr between feedings  Elimination Problems: No      Equipment:  Nipple Shield             Type: none             Size: n/a             Frequency of Use: n/a  Pump            Type: Spectra S2            Frequency of Use: 3-4 times a day after feeding   Shells            Type: none            Frequency of use: n/a    Equipment Problems: no    Mom:  Breast: Medium sized symmetrical breasts  Nipple Assessment in General: Large everted nipples bilaterally  Two supernumary nipples near the left axilla and one on the right near the axilla  Well healed surgical scar near the right axilla from removal of extra nipple  Mother's Awareness of Feeding Cues                 Recognizes:  Yes                  Verbalizes: Yes  Support System: FOB, extended family  History of Breastfeeding: none  Changes/Stressors/Violence: Shelley Cuellar has been concerned about Keely's health and about her supply  Concerns/Goals: Shelley Cuellar would like to [facility]     Problems with Mom: concerns with supply     Physical Exam   Constitutional: She is oriented to person, place, and time  She appears well-developed and well-nourished  HENT:   Head: Normocephalic and atraumatic  Neck: Normal range of motion  Neck supple  Cardiovascular: Normal rate, regular rhythm and normal heart sounds  Pulmonary/Chest: Effort normal and breath sounds normal    Musculoskeletal: Normal range of motion  She exhibits no edema  Neurological: She is alert and oriented to person, place, and time  Skin: Skin is warm and dry  Psychiatric: She has a normal mood and affect  Her behavior is normal  Judgment and thought content normal          Infant:  Behaviors: Alert  Color: Pink  Birth weight: 3125gram  Current weight: 2955gram    Problems with infant: None    General Appearance:  Alert, active, no distress                            Head:  Normocephalic, AFOF, sutures opposed                            Eyes:   Conjunctiva clear, no drainage                            Ears:   Normally placed, no anomolies                           Nose:   no drainage or erythema                          Mouth:  No lesions  High palate  Tongue extends beyond the lower lip, lateralizes  Tipe flattens when mouth opens wide and distorts slightly  Some good cupping of my finger and effective peristalsis noted briefly but then tongue primarily bunched up and the lower alveolar ridge was felt  Lingual frenulum is short and thin and attached near the tip of the tongue  Limits ability to elevate the tongue somewhat  Neck:  Supple, symmetrical, trachea midline                Respiratory:  No grunting, flaring, retractions, breath sounds clear and equal           Cardiovascular:  Regular rate and rhythm  No murmur  Adequate perfusion/capillary refill   Femoral pulse present                  Abdomen:    Soft, non-tender, no masses, bowel sounds present, no HSM            Genitourinary:  Normal female genitalia, anus patent Spine:   No abnormalities noted       Musculoskeletal:   Full range of motion         Skin/Hair/Nails:   Skin warm, dry, and intact, no rashes, jaundice to nipple line               Neurologic:   No abnormal movement, tone appropriate for gestational age       [place] Latch:  Efficiency:               Lips Flanged: Yes, after repositioning  Initially the upper lip was curled under              Depth of latch: wide              Audible Swallow: Yes, frequent and rhythmic for a brief period              Visible Milk: Yes              Wide Open/ Asymmetrical: Yes              Suck Swallow Cycle: Breathing: unlabored, Coordinated: yes  Nipple Assessment after latch: Very subtle crease in the right nipple  No distortion of the left nipple  Latch Problems: Initial latch was a little shallow but Winter Jackson recognized it, broke the latch, repositioned and achieved a better latch  She nursed on both breasts and transferred 20 grams of milk but was looking for more  Position:  Infant's Ergonomics/Body               Body Alignment: Yes               Head Supported: Yes               Close to Mom's body/ Lifted/ Supported: Yes               Mom's Ergonomics/Body: Yes                           Supported: Yes                           Sitting Back: Yes                           Brings Baby to her breast: Yes  Positioning Problems: None      Handouts: Increasing Your Supply    Education:  Discussed increasing supply with increased stimulation of the breast and reviewed several galactagogues  Plan:  Continue with current plan  Consider galactagogues for increasing supply  Dr Shabnam Lema available for additional support as desired  I have spent 45 minutes with Patient and family today in which greater than 50% of this time was spent in counseling/coordination of care regarding Patient and family education

## 2020-03-27 NOTE — PATIENT INSTRUCTIONS
Continue with the current plan  Consider additional evaluation with Dr Bambi Anderson as desired  Call with any questions or concerns

## 2020-03-27 NOTE — PROGRESS NOTES
I have reviewed the notes, assessments, and/or procedures performed by Ronda Rai RN, IBCLC, I concur with her/his documentation of Tab Zarate MD 03/27/20

## 2020-04-02 ENCOUNTER — TELEPHONE (OUTPATIENT)
Dept: OBGYN CLINIC | Facility: CLINIC | Age: 50
End: 2020-04-02

## 2020-04-06 ENCOUNTER — POSTPARTUM VISIT (OUTPATIENT)
Dept: OBGYN CLINIC | Facility: CLINIC | Age: 50
End: 2020-04-06

## 2020-04-06 VITALS — DIASTOLIC BLOOD PRESSURE: 72 MMHG | WEIGHT: 130.4 LBS | BODY MASS INDEX: 23.85 KG/M2 | SYSTOLIC BLOOD PRESSURE: 110 MMHG

## 2020-04-06 DIAGNOSIS — Z98.891 S/P CESAREAN SECTION: Primary | ICD-10-CM

## 2020-04-06 PROCEDURE — 99024 POSTOP FOLLOW-UP VISIT: CPT | Performed by: OBSTETRICS & GYNECOLOGY

## 2020-04-18 ENCOUNTER — DOCUMENTATION (OUTPATIENT)
Dept: OBGYN CLINIC | Facility: CLINIC | Age: 50
End: 2020-04-18

## 2020-04-18 ENCOUNTER — TELEPHONE (OUTPATIENT)
Dept: OTHER | Facility: OTHER | Age: 50
End: 2020-04-18

## 2020-04-18 DIAGNOSIS — N61.0 MASTITIS: Primary | ICD-10-CM

## 2020-04-19 ENCOUNTER — DOCUMENTATION (OUTPATIENT)
Dept: LABOR AND DELIVERY | Facility: HOSPITAL | Age: 50
End: 2020-04-19

## 2020-04-19 ENCOUNTER — TELEPHONE (OUTPATIENT)
Dept: OTHER | Facility: OTHER | Age: 50
End: 2020-04-19

## 2020-04-19 DIAGNOSIS — N61.0 MASTITIS: Primary | ICD-10-CM

## 2020-04-20 ENCOUNTER — TELEPHONE (OUTPATIENT)
Dept: OBGYN CLINIC | Facility: CLINIC | Age: 50
End: 2020-04-20

## 2020-04-22 ENCOUNTER — TELEMEDICINE (OUTPATIENT)
Dept: OBGYN CLINIC | Facility: CLINIC | Age: 50
End: 2020-04-22
Payer: COMMERCIAL

## 2020-04-22 DIAGNOSIS — N61.0 MASTITIS: Primary | ICD-10-CM

## 2020-04-22 PROCEDURE — 99213 OFFICE O/P EST LOW 20 MIN: CPT | Performed by: STUDENT IN AN ORGANIZED HEALTH CARE EDUCATION/TRAINING PROGRAM

## 2020-05-01 ENCOUNTER — TRANSCRIBE ORDERS (OUTPATIENT)
Dept: ADMINISTRATIVE | Facility: HOSPITAL | Age: 50
End: 2020-05-01

## 2020-05-01 DIAGNOSIS — Z12.31 SCREENING MAMMOGRAM, ENCOUNTER FOR: Primary | ICD-10-CM

## 2020-07-08 PROBLEM — Z3A.38 38 WEEKS GESTATION OF PREGNANCY: Status: RESOLVED | Noted: 2020-02-18 | Resolved: 2020-07-08

## 2020-07-08 PROBLEM — O09.519 HIGH-RISK PREGNANCY, PRIMIGRAVIDA OF ADVANCED MATERNAL AGE: Status: RESOLVED | Noted: 2019-09-10 | Resolved: 2020-07-08

## 2020-07-08 PROBLEM — O09.819 PREGNANCY RESULTING FROM ASSISTED REPRODUCTIVE TECHNOLOGY, ANTEPARTUM: Status: RESOLVED | Noted: 2019-09-10 | Resolved: 2020-07-08

## 2020-07-08 PROBLEM — Z34.83 PRENATAL CARE, SUBSEQUENT PREGNANCY, THIRD TRIMESTER: Status: RESOLVED | Noted: 2020-03-05 | Resolved: 2020-07-08

## 2020-07-08 PROBLEM — N84.1 ENDOCERVICAL POLYP: Status: RESOLVED | Noted: 2020-01-17 | Resolved: 2020-07-08

## 2020-07-08 PROBLEM — Z98.891 S/P CESAREAN SECTION: Status: RESOLVED | Noted: 2020-03-21 | Resolved: 2020-07-08

## 2021-01-24 DIAGNOSIS — Z23 ENCOUNTER FOR IMMUNIZATION: ICD-10-CM

## 2021-01-26 ENCOUNTER — IMMUNIZATIONS (OUTPATIENT)
Dept: FAMILY MEDICINE CLINIC | Facility: HOSPITAL | Age: 51
End: 2021-01-26

## 2021-01-26 DIAGNOSIS — Z23 ENCOUNTER FOR IMMUNIZATION: Primary | ICD-10-CM

## 2021-01-26 PROCEDURE — 0011A SARS-COV-2 / COVID-19 MRNA VACCINE (MODERNA) 100 MCG: CPT | Performed by: PHYSICIAN ASSISTANT

## 2021-01-26 PROCEDURE — 91301 SARS-COV-2 / COVID-19 MRNA VACCINE (MODERNA) 100 MCG: CPT | Performed by: PHYSICIAN ASSISTANT

## 2021-02-23 ENCOUNTER — IMMUNIZATIONS (OUTPATIENT)
Dept: FAMILY MEDICINE CLINIC | Facility: HOSPITAL | Age: 51
End: 2021-02-23

## 2021-02-23 DIAGNOSIS — Z23 ENCOUNTER FOR IMMUNIZATION: Primary | ICD-10-CM

## 2021-02-23 PROCEDURE — 91301 SARS-COV-2 / COVID-19 MRNA VACCINE (MODERNA) 100 MCG: CPT

## 2021-02-23 PROCEDURE — 0012A SARS-COV-2 / COVID-19 MRNA VACCINE (MODERNA) 100 MCG: CPT

## 2021-03-04 ENCOUNTER — OFFICE VISIT (OUTPATIENT)
Dept: DERMATOLOGY | Facility: CLINIC | Age: 51
End: 2021-03-04
Payer: COMMERCIAL

## 2021-03-04 VITALS — TEMPERATURE: 96.8 F | WEIGHT: 138.2 LBS | HEIGHT: 62 IN | BODY MASS INDEX: 25.43 KG/M2

## 2021-03-04 DIAGNOSIS — L82.1 SEBORRHEIC KERATOSES: ICD-10-CM

## 2021-03-04 DIAGNOSIS — L40.9 PSORIASIS: Primary | ICD-10-CM

## 2021-03-04 PROCEDURE — 1036F TOBACCO NON-USER: CPT | Performed by: DERMATOLOGY

## 2021-03-04 PROCEDURE — 99204 OFFICE O/P NEW MOD 45 MIN: CPT | Performed by: DERMATOLOGY

## 2021-03-04 PROCEDURE — 3008F BODY MASS INDEX DOCD: CPT | Performed by: DERMATOLOGY

## 2021-03-04 RX ORDER — FLUOCINONIDE TOPICAL SOLUTION USP, 0.05% 0.5 MG/ML
SOLUTION TOPICAL
Qty: 60 ML | Refills: 2 | Status: SHIPPED | OUTPATIENT
Start: 2021-03-04 | End: 2021-06-10 | Stop reason: ALTCHOICE

## 2021-03-04 NOTE — PROGRESS NOTES
[facility] Note     Patient Name: Chon Murguia  Encounter Date: 03/04/2021     Have you been cared for by a [facility] Dermatologist in the last 3 years and, if so, which one? No    · Have you traveled outside of the [facility] in the past 3 months? No     May we call your Preferred Phone number to discuss your specific medical information? Yes     May we leave a detailed message that includes your specific medical information? Yes      Today's Chief Concerns:   Concern #1:  Scalp psoriasis    Concern #2:  Full body skin exam     Past Medical History:  Have you personally ever had or currently have any of the following? · Skin cancer (such as Melanoma, Basal Cell Carcinoma, Squamous Cell Carcinoma? (If Yes, please provide more detail)- No  · Eczema: No  · Psoriasis: YES  · HIV/AIDS: No  · Hepatitis B or C: No  · Tuberculosis: No  · Systemic Immunosuppression such as Diabetes, Biologic or Immunotherapy, Chemotherapy, Organ Transplantation, Bone Marrow Transplantation (If YES, please provide more detail): No  · Radiation Treatment (If YES, please provide more detail): No  · Any other major medical conditions/concerns? 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 Walking     Family history:    Have any of your "first degree relatives" (parent, brother, sister, or child) had any of the following       · Skin cancer such as Melanoma or Merkel Cell Carcinoma or Pancreatic Cancer? No  · Eczema, Asthma, Hay Fever or Seasonal Allergies: No  · Psoriasis or Psoriatic Arthritis: YES, aunt has RA  · Do any other medical conditions seem to run in your family? If Yes, what condition and which relatives?   No    Current Medications (update all dermatological medications before printing patient's AVS):    Current Outpatient Medications:     acetaminophen (TYLENOL) 325 mg tablet, Take 2 tablets (650 mg total) by mouth every 4 (four) hours as needed for mild pain (Patient not taking: Reported on 4/22/2020), Disp: 30 tablet, Rfl: 0    docusate sodium (COLACE) 100 mg capsule, Take 1 capsule (100 mg total) by mouth 2 (two) times a day (Patient not taking: Reported on 4/22/2020), Disp: 180 each, Rfl: 0    famotidine (PEPCID) 10 mg tablet, Take 10 mg by mouth 2 (two) times a day, Disp: , Rfl:     ibuprofen (MOTRIN) 800 mg tablet, Take 1 tablet (800 mg total) by mouth every 8 (eight) hours as needed for mild pain (Patient not taking: Reported on 3/27/2020), Disp: 30 tablet, Rfl: 0    Prenatal Vit-Fe Fumarate-FA (PRENATAL VITAMIN) 28-0 8 mg, Take by mouth, Disp: , Rfl:       Review of Systems/System Alerts:  Have you recently had or currently have any of the following? If YES, what are you doing for the problem? · Fever, chills or unintended weight loss: No  · Sudden loss or change in your vision: No  · Nausea, vomiting or blood in your stool: No  · Painful or swollen joints: No  · Wheezing or cough: No  · Changing mole or non-healing wound: No  · Nosebleeds: No  · Excessive sweating: No  · Easy or prolonged bleeding? No  · Over the last 2 weeks, how often have you been bothered by the following problems? · Taking little interest or pleasure in doing things: 1 - Not at All  · Feeling down, depressed, or hopeless: 1 - Not at All  · Rapid heartbeat with epinephrine:  No  · FEMALES ONLY:    · Are you pregnant or planning to become pregnant? No  · Are you currently or planning to be nursing or breast feeding? No  · Any known allergies? Yes, see below  Allergies   Allergen Reactions    Dicloxacillin Rash   ·       PHYSICAL EXAM:       Was a chaperone (Derm Clinical Assistant) present throughout the entire Physical Exam? Yes     Did the Dermatology Team specifically  the patient on the importance of a Full Skin Exam to be sure that nothing is missed clinically?  Yes}  o Did the patient request or accept a Full Skin Exam?  Yes  o Did the patient specifically refuse to have the areas "under-the-bra" examined by the Dermatologist? No  o Did the patient specifically refuse to have the areas "under-the-underwear" examined by the Dermatologist? No      CONSTITUTIONAL (Height, Weight, and Temperature must be recorded):   Vitals:    03/04/21 1138   Temp: (!) 96 8 °F (36 °C)   TempSrc: Tympanic   Weight: 62 7 kg (138 lb 3 2 oz)   Height: 5' 2" (1 575 m)       PSYCH: Normal mood and affect  EYES: Normal conjunctiva  ENT: Normal lips and oral mucosa  CARDIOVASCULAR: No edema  RESPIRATORY: Normal respirations  HEME/LYMPH/IMMUNO:  No regional lymphadenopathy except as noted below in ASSESSMENT AND PLAN BY DIAGNOSIS    SKIN:  FULL ORGAN SYSTEM EXAM  Hair, Scalp, Ears, Face Normal except as noted below in Assessment   Neck, Cervical Chain Nodes Normal except as noted below in Assessment   Right Arm/Hand/Fingers Normal except as noted below in Assessment   Left Arm/Hand/Fingers Normal except as noted below in Assessment   Chest/Breasts/Axillae Viewed areas Normal except as noted below in Assessment   Abdomen, Umbilicus Normal except as noted below in Assessment   Back/Spine Normal except as noted below in Assessment   Groin/Genitalia/Buttocks Normal except as noted below in Assessment   Right Leg, Foot, Toes Normal except as noted below in Assessment   Left Leg, Foot, Toes Normal except as noted below in Assessment        ASSESSMENT AND PLAN BY DIAGNOSIS:    History of Present Condition:     Duration:  How long has this been an issue for you?    o  20 years    Location Affected:  Where on the body is this affecting you? o  scalp    Quality:  Is there any bleeding, pain, itch, burning/irritation, or redness associated with the skin lesion? o  itching    Severity:  Describe any bleeding, pain, itch, burning/irritation, or redness on a scale of 1 to 10 (with 10 being the worst)    o  5   Timing:  Does this condition seem to be there pretty constantly or do you notice it more at specific times throughout the day?    o  flares at times   Context:  Have you ever noticed that this condition seems to be associated with specific activities you do?    o  denies   Modifying Factors:    o Anything that seems to make the condition worse?    -  denies   o What have you tried to do to make the condition better?    -  Patient has tried tacrolimus with little improvement    Associated Signs and Symptoms:  Does this skin lesion seem to be associated with any of the following:  o  SL AMB DERM SIGNS AND SYMPTOMS: Redness and Itching and Scratching       1  PSORIASIS    Physical Exam:   Anatomic Location Affected:  Scalp    Morphological Description:  Scaly pink plaques    Severity: mild   Body Percent Affected: <5%   Pertinent Positives:   Pertinent Negatives: Additional History of Present Condition:  Present for years  Located on the scalp  Patient states it becomes itchy at times and flares up occasionally  Patient has tried tacrolimus with some improvement  Assessment and Plan:  Based on a thorough discussion of this condition and the management approach to it (including a comprehensive discussion of the known risks, side effects and potential benefits of treatment), the patient (family) agrees to implement the following specific plan:   Patient instructed to apply prescribed Lidex solution to the scalp at bedtime for 2-3 weeks   Patient instructed to follow up as needed      Psoriasis is a chronic inflammatory condition that causes the body to make new skin cells in days rather than weeks  As these cells pile up on the surface of the skin, you may see thick, scaly patches of thickened skin  Psoriasis affects 2-4% of males and females  It can start at any age including childhood, with peaks of onset at 15-25 years and 50-60 years  It tends to persist lifelong, fluctuating in extent and severity   It is particularly common in Caucasians but may affect people of any race  About one-third of patients with psoriasis have family members with psoriasis  Psoriasis is multifactorial  It is classified as an immune-mediated inflammatory disease (IMID)  Genetic factors are important and influence the type of psoriasis and response to treatment  What are the signs and symptoms of psoriasis? There are many different types of psoriasis that each have present uniquely  The types of psoriasis include:    Plaque psoriasis: About 80% to 90% of people who have psoriasis develop this type  When plaque psoriasis appears, you may see:  Plaque psoriasis usually presents with symmetrically distributed, red, scaly plaques with well-defined edges  The scale is typically silvery white, except in skin folds where the plaques often appear shiny and they may have a moist peeling surface  The most common sites are scalp, elbows and knees, but any part of the skin can be involved  The plaques are usually very persistent without treatment  Itch is mostly mild but may be severe in some patients, leading to scratching and lichenification (thickened leathery skin with increased skin markings)  Painful skin cracks or fissures may occur  When psoriatic plaques clear up, they may leave brown or pale marks that can be expected to fade over several months  Guttate psoriasis: When someone gets this type of psoriasis, you often see tiny bumps appear on the skin quite suddenly  The bumps tend to cover much of the torso, legs, and arms  Sometimes, the bumps also develop on the face, scalp, and ears  No matter where they appear, the bumps tend to be:    Small and scaly   [place]-colored to pink   Temporary, clearing in a few weeks or months without treatment  When guttate psoriasis clears, it may never return  Why this happens is still a bit of a mystery   Guttate psoriasis tends to develop in children and young adults who've had an infection, such as strep throat  It's possible that when the infection clears so does guttate psoriasis  It's also possible to have:   Guttate psoriasis for life   See the guttate psoriasis clear and plaque psoriasis develop later in life   Plaque psoriasis when you develop guttate psoriasis  There's no way to predict what will happen after the first flare-up of guttate psoriasis clears  Inverse psoriasis: This type of psoriasis develops in areas where skin touches skin, such as the armpits, genitals, and crease of the buttocks  Where the inverse psoriasis appears, you're likely to notice:   Smooth, red patches of skin that look raw   Little, if any, silvery-white coating   Sore or painful skin  Other names for this type of psoriasis are intertriginous psoriasis or flexural psoriasis  Pustular psoriasis: This type of psoriasis causes pus-filled bumps that usually appear only on the feet and hands  While the pus-filled bumps may look like an infection, the skin is not infected  The bumps don't contain bacteria or anything else that could cause an infection  Where pustular psoriasis appears, you tend to notice:   Red, swollen skin that is dotted with pus-filled bumps   Extremely sore or painful skin   Brown dots (and sometimes scale) appear as the pus-filled bumps dry  Pustular psoriasis can make just about any activity that requires your hands or feet, such as typing or walking, unbearably painful  Pustular psoriasis (generalized): Serious and life-threatening, this rare type of psoriasis causes pus-filled bumps to develop on much of the skin  Also called von Zumbusch psoriasis, a flare-up causes this sequence of events:  1  Skin on most of the body suddenly turns dry, red, and tender  2  Within hours, pus-filled bumps cover most of the skin  3  Often within a day, the pus-filled bumps break open and pools of pus leak onto the skin    4  As the pus dries (usually within 24 to 48 hours), the skin dries out and peels (as shown in this picture)  5  When the dried skin peels off, you see a smooth, glazed surface  6  In a few days or weeks, you may see a new crop of pus-filled bumps covering most of the skin, as the cycle repeats itself  Anyone with pustular psoriasis also feels very sick, and may develop a fever, headache, muscle weakness, and other symptoms  Medical care is often necessary to save the person's life  Erythrodermic psoriasis: Serious and life-threatening, this type of psoriasis requires immediate medical care  When someone develops erythrodermic psoriasis, you may notice:   Skin on most of the body looks burnt   Chills, fever, and the person looks extremely ill   Muscle weakness, a rapid pulse, and severe itch  The person may also be unable to keep warm, so hypothermia can set in quickly  Most people who develop this type of psoriasis already have another type of psoriasis  Before developing erythrodermic psoriasis, they often notice that their psoriasis is worsening or not improving with treatment  If you notice either of these happening, see a board-certified dermatologist     Nails    Nail psoriasis: With any type of psoriasis, you may see changes to your fingernails or toenails  About half of the people who have plaque psoriasis see signs of psoriasis on their fingernails at some point2  When psoriasis affects the nails, you may notice:   Tiny dents in your nails (called nail pits)   White, yellow, or brown discoloration under one or more nails   Crumbling, rough nails   A nail lifting up so that it's no longer attached   Buildup of skin cells beneath one or more nails, which lifts up the nail  Treatment and proper nail care can help you control nail psoriasis  Psoriatic arthritis: If you have psoriasis, it's important to pay attention to your joints  Some people who have psoriasis develop a type of arthritis called psoriatic arthritis   This is more likely to occur if you have severe psoriasis  Most people notice psoriasis on their skin years before they develop psoriatic arthritis  It's also possible to get psoriatic arthritis before psoriasis, but this is less common  When psoriatic arthritis develops, the signs can be subtle  At first, you may notice:   A swollen and tender joint, especially in a finger or toe   Heel pain   Swelling on the back of your leg, just above your heel   Stiffness in the morning that fades during the day  Like psoriasis, psoriatic arthritis cannot be cured  Treatment can prevent psoriatic arthritis from worsening, which is important  Allowed to progress, psoriatic arthritis can become disabling  Diagnosis and treatment of psoriasis   Psoriasis is usually diagnosed by clinical features, and skin biopsy if necessary  It is important to decrease factors that aggravate psoriasis  These include treating streptococcal infections, minimizing skin injuries, avoiding sun exposure if it exacerbates psoriasis, smoking, alcohol usage, decreasing stress, and maintaining an optimal body weight  Certain medications such as lithium, beta blockers, antimalarials, and NSAIDs have also been implicated  Suddenly stopping oral steroids or strong topical steroids can cause rebound disease  There are many categories of psoriasis treatments available  Topical therapy  Mild psoriasis is generally treated with topical agents alone  Which treatment is selected may depend on body site, extent and severity of psoriasis   Emollients   Coal tar preparations   Dithranol   Salicylic acid   Vitamin D analogue (calcipotriol)   Topical corticosteroids   Calcineurin inhibitor (tacrolimus, pimecrolimus)  Phototherapy  Most psoriasis centres offer phototherapy with ultraviolet (UV) radiation, often in combination with topical or systemic agents   Types of phototherapy include   Narrowband UVB   Broadband UVB   Photochemotherapy (PUVA)   Targeted phototherapy  Systemic therapy  Moderate to severe psoriasis warrants treatment with a systemic agent and/or phototherapy  The most common treatments are:   Methotrexate   Ciclosporin   Acitretin  Other medicines occasionally used for psoriasis include:   Mycophenolate   Apremilast   Hydroxyurea   Azathioprine   6-mercaptopurine  Systemic corticosteroids are best avoided due to a risk of severe withdrawal flare of psoriasis and adverse effects  Biologics or targeted therapies are reserved for conventional treatment-resistant severe psoriasis, mainly because of expense, as side effects compare favorably with other systemic agents  These include:   Anti-tumour necrosis factor-alpha antagonists (anti-TNF?) infliximab, adalimumab and etanercept   The interleukin (IL)-12/23 antagonist ustekinumab   IL-17 antagonists such as secukinumab  Many other monoclonal antibodies are under investigation in the treatment of psoriasis  2 SEBORRHEIC KERATOSIS; NON-INFLAMED    Physical Exam:   Anatomic Location Affected:  Trunk and extremities    Morphological Description:  Flat and raised, waxy, smooth to warty textured, yellow to brownish-grey to dark brown to blackish, discrete, "stuck-on" appearing papules   Pertinent Positives:   Pertinent Negatives: Additional History of Present Condition:  Patient reports new bumps on the skin  Denies itch, burn, pain, bleeding or ulceration  Present constantly; nothing seems to make it worse or better  No prior treatment  Assessment and Plan:  Based on a thorough discussion of this condition and the management approach to it (including a comprehensive discussion of the known risks, side effects and potential benefits of treatment), the patient (family) agrees to implement the following specific plan:   Benign, no treatment necessary     Seborrheic Keratosis  A seborrheic keratosis is a harmless warty spot that appears during adult life as a common sign of skin aging    Seborrheic keratoses can arise on any area of skin, covered or uncovered, with the usual exception of the palms and soles  They do not arise from mucous membranes  Seborrheic keratoses can have highly variable appearance  Seborrheic keratoses are extremely common  It has been estimated that over 90% of adults over the age of 61 years have one or more of them  They occur in males and females of all races, typically beginning to erupt in the 35s or 45s  They are uncommon under the age of 21 years  The precise cause of seborrhoeic keratoses is not known  Seborrhoeic keratoses are considered degenerative in nature  As time goes by, seborrheic keratoses tend to become more numerous  Some people inherit a tendency to develop a very large number of them; some people may have hundreds of them  The name "seborrheic keratosis" is misleading, because these lesions are not limited to a seborrhoeic distribution (scalp, mid-face, chest, upper back), nor are they formed from sebaceous glands, nor are they associated with sebum -- which is greasy  Seborrheic keratosis may also be called "SK," "Seb K," "basal cell papilloma," "senile wart," or "barnacle "      Researchers have noted:   Eruptive seborrhoeic keratoses can follow sunburn or dermatitis   Skin friction may be the reason they appear in body folds   Viral cause (e g , human papillomavirus) seems unlikely   Stable and clonal mutations or activation of FRFR3, PIK3CA, ROGELIO, AKT1 and EGFR genes are found in seborrhoeic keratoses   Seborrhoeic keratosis can arise from solar lentigo   FRFR3 mutations also arise in solar lentigines   These mutations are associated with increased age and location on the head and neck, suggesting a role of ultraviolet radiation in these lesions   Seborrheic keratoses do not harbour tumour suppressor gene mutations   Epidermal growth factor receptor inhibitors, which are used to treat some cancers, often result in an increase in verrucal (warty) keratoses  There is no easy way to remove multiple lesions on a single occasion  Unless a specific lesion is "inflamed" and is causing pain or stinging/burning or is bleeding, most insurance companies do not authorize treatment  Mild scalp psoriasis, discussed treatment options and will use lidex solution for current flare  Medication side effects discussed and instructions given  SKs noted on FBSE, discussed benign quality and treatments, none desired  Recommended return for new or changing lesions      Scribe Attestation    I,:  Gregory Silva RN am acting as a scribe while in the presence of the attending physician :       I,:  Polly Ha MD personally performed the services described in this documentation    as scribed in my presence :

## 2021-03-04 NOTE — PATIENT INSTRUCTIONS
1  PSORIASIS    Assessment and Plan:  Based on a thorough discussion of this condition and the management approach to it (including a comprehensive discussion of the known risks, side effects and potential benefits of treatment), the patient (family) agrees to implement the following specific plan:   Patient instructed to apply prescribed Lidex solution to the scalp at bedtime for 2-3 weeks   Patient instructed to follow up as needed      Psoriasis is a chronic inflammatory condition that causes the body to make new skin cells in days rather than weeks  As these cells pile up on the surface of the skin, you may see thick, scaly patches of thickened skin  Psoriasis affects 2-4% of males and females  It can start at any age including childhood, with peaks of onset at 15-25 years and 50-60 years  It tends to persist lifelong, fluctuating in extent and severity  It is particularly common in Caucasians but may affect people of any race  About one-third of patients with psoriasis have family members with psoriasis  Psoriasis is multifactorial  It is classified as an immune-mediated inflammatory disease (IMID)  Genetic factors are important and influence the type of psoriasis and response to treatment  What are the signs and symptoms of psoriasis? There are many different types of psoriasis that each have present uniquely  The types of psoriasis include:    Plaque psoriasis: About 80% to 90% of people who have psoriasis develop this type  When plaque psoriasis appears, you may see:  Plaque psoriasis usually presents with symmetrically distributed, red, scaly plaques with well-defined edges  The scale is typically silvery white, except in skin folds where the plaques often appear shiny and they may have a moist peeling surface  The most common sites are scalp, elbows and knees, but any part of the skin can be involved  The plaques are usually very persistent without treatment    Itch is mostly mild but may be severe in some patients, leading to scratching and lichenification (thickened leathery skin with increased skin markings)  Painful skin cracks or fissures may occur  When psoriatic plaques clear up, they may leave brown or pale marks that can be expected to fade over several months  Guttate psoriasis: When someone gets this type of psoriasis, you often see tiny bumps appear on the skin quite suddenly  The bumps tend to cover much of the torso, legs, and arms  Sometimes, the bumps also develop on the face, scalp, and ears  No matter where they appear, the bumps tend to be:    Small and scaly   [place]-colored to pink   Temporary, clearing in a few weeks or months without treatment  When guttate psoriasis clears, it may never return  Why this happens is still a bit of a mystery  Guttate psoriasis tends to develop in children and young adults who've had an infection, such as strep throat  It's possible that when the infection clears so does guttate psoriasis  It's also possible to have:   Guttate psoriasis for life   See the guttate psoriasis clear and plaque psoriasis develop later in life   Plaque psoriasis when you develop guttate psoriasis  There's no way to predict what will happen after the first flare-up of guttate psoriasis clears  Inverse psoriasis: This type of psoriasis develops in areas where skin touches skin, such as the armpits, genitals, and crease of the buttocks  Where the inverse psoriasis appears, you're likely to notice:   Smooth, red patches of skin that look raw   Little, if any, silvery-white coating   Sore or painful skin  Other names for this type of psoriasis are intertriginous psoriasis or flexural psoriasis  Pustular psoriasis: This type of psoriasis causes pus-filled bumps that usually appear only on the feet and hands  While the pus-filled bumps may look like an infection, the skin is not infected   The bumps don't contain bacteria or anything else that could cause an infection  Where pustular psoriasis appears, you tend to notice:   Red, swollen skin that is dotted with pus-filled bumps   Extremely sore or painful skin   Brown dots (and sometimes scale) appear as the pus-filled bumps dry  Pustular psoriasis can make just about any activity that requires your hands or feet, such as typing or walking, unbearably painful  Pustular psoriasis (generalized): Serious and life-threatening, this rare type of psoriasis causes pus-filled bumps to develop on much of the skin  Also called von Zumbusch psoriasis, a flare-up causes this sequence of events:  1  Skin on most of the body suddenly turns dry, red, and tender  2  Within hours, pus-filled bumps cover most of the skin  3  Often within a day, the pus-filled bumps break open and pools of pus leak onto the skin  4  As the pus dries (usually within 24 to 48 hours), the skin dries out and peels (as shown in this picture)  5  When the dried skin peels off, you see a smooth, glazed surface  6  In a few days or weeks, you may see a new crop of pus-filled bumps covering most of the skin, as the cycle repeats itself  Anyone with pustular psoriasis also feels very sick, and may develop a fever, headache, muscle weakness, and other symptoms  Medical care is often necessary to save the person's life  Erythrodermic psoriasis: Serious and life-threatening, this type of psoriasis requires immediate medical care  When someone develops erythrodermic psoriasis, you may notice:   Skin on most of the body looks burnt   Chills, fever, and the person looks extremely ill   Muscle weakness, a rapid pulse, and severe itch  The person may also be unable to keep warm, so hypothermia can set in quickly  Most people who develop this type of psoriasis already have another type of psoriasis  Before developing erythrodermic psoriasis, they often notice that their psoriasis is worsening or not improving with treatment   If you notice either of these happening, see a board-certified dermatologist     Nails    Nail psoriasis: With any type of psoriasis, you may see changes to your fingernails or toenails  About half of the people who have plaque psoriasis see signs of psoriasis on their fingernails at some point2  When psoriasis affects the nails, you may notice:   Tiny dents in your nails (called nail pits)   White, yellow, or brown discoloration under one or more nails   Crumbling, rough nails   A nail lifting up so that it's no longer attached   Buildup of skin cells beneath one or more nails, which lifts up the nail  Treatment and proper nail care can help you control nail psoriasis  Psoriatic arthritis: If you have psoriasis, it's important to pay attention to your joints  Some people who have psoriasis develop a type of arthritis called psoriatic arthritis  This is more likely to occur if you have severe psoriasis  Most people notice psoriasis on their skin years before they develop psoriatic arthritis  It's also possible to get psoriatic arthritis before psoriasis, but this is less common  When psoriatic arthritis develops, the signs can be subtle  At first, you may notice:   A swollen and tender joint, especially in a finger or toe   Heel pain   Swelling on the back of your leg, just above your heel   Stiffness in the morning that fades during the day  Like psoriasis, psoriatic arthritis cannot be cured  Treatment can prevent psoriatic arthritis from worsening, which is important  Allowed to progress, psoriatic arthritis can become disabling  Diagnosis and treatment of psoriasis   Psoriasis is usually diagnosed by clinical features, and skin biopsy if necessary  It is important to decrease factors that aggravate psoriasis  These include treating streptococcal infections, minimizing skin injuries, avoiding sun exposure if it exacerbates psoriasis, smoking, alcohol usage, decreasing stress, and maintaining an optimal body weight  Certain medications such as lithium, beta blockers, antimalarials, and NSAIDs have also been implicated  Suddenly stopping oral steroids or strong topical steroids can cause rebound disease  There are many categories of psoriasis treatments available  Topical therapy  Mild psoriasis is generally treated with topical agents alone  Which treatment is selected may depend on body site, extent and severity of psoriasis   Emollients   Coal tar preparations   Dithranol   Salicylic acid   Vitamin D analogue (calcipotriol)   Topical corticosteroids   Calcineurin inhibitor (tacrolimus, pimecrolimus)  Phototherapy  Most psoriasis centres offer phototherapy with ultraviolet (UV) radiation, often in combination with topical or systemic agents  Types of phototherapy include   Narrowband UVB   Broadband UVB   Photochemotherapy (PUVA)   Targeted phototherapy  Systemic therapy  Moderate to severe psoriasis warrants treatment with a systemic agent and/or phototherapy  The most common treatments are:   Methotrexate   Ciclosporin   Acitretin  Other medicines occasionally used for psoriasis include:   Mycophenolate   Apremilast   Hydroxyurea   Azathioprine   6-mercaptopurine  Systemic corticosteroids are best avoided due to a risk of severe withdrawal flare of psoriasis and adverse effects  Biologics or targeted therapies are reserved for conventional treatment-resistant severe psoriasis, mainly because of expense, as side effects compare favorably with other systemic agents  These include:   Anti-tumour necrosis factor-alpha antagonists (anti-TNF?) infliximab, adalimumab and etanercept   The interleukin (IL)-12/23 antagonist ustekinumab   IL-17 antagonists such as secukinumab  Many other monoclonal antibodies are under investigation in the treatment of psoriasis      2 SEBORRHEIC KERATOSIS; NON-INFLAMED    Assessment and Plan:  Based on a thorough discussion of this condition and the management approach to it (including a comprehensive discussion of the known risks, side effects and potential benefits of treatment), the patient (family) agrees to implement the following specific plan:   Benign, no treatment necessary     Seborrheic Keratosis  A seborrheic keratosis is a harmless warty spot that appears during adult life as a common sign of skin aging  Seborrheic keratoses can arise on any area of skin, covered or uncovered, with the usual exception of the palms and soles  They do not arise from mucous membranes  Seborrheic keratoses can have highly variable appearance  Seborrheic keratoses are extremely common  It has been estimated that over 90% of adults over the age of 61 years have one or more of them  They occur in males and females of all races, typically beginning to erupt in the 35s or 45s  They are uncommon under the age of 21 years  The precise cause of seborrhoeic keratoses is not known  Seborrhoeic keratoses are considered degenerative in nature  As time goes by, seborrheic keratoses tend to become more numerous  Some people inherit a tendency to develop a very large number of them; some people may have hundreds of them  The name "seborrheic keratosis" is misleading, because these lesions are not limited to a seborrhoeic distribution (scalp, mid-face, chest, upper back), nor are they formed from sebaceous glands, nor are they associated with sebum -- which is greasy    Seborrheic keratosis may also be called "SK," "Seb K," "basal cell papilloma," "senile wart," or "barnacle "      Researchers have noted:   Eruptive seborrhoeic keratoses can follow sunburn or dermatitis   Skin friction may be the reason they appear in body folds   Viral cause (e g , human papillomavirus) seems unlikely   Stable and clonal mutations or activation of FRFR3, PIK3CA, ROGELIO, AKT1 and EGFR genes are found in seborrhoeic keratoses   Seborrhoeic keratosis can arise from solar lentigo   FRFR3 mutations also arise in solar lentigines  These mutations are associated with increased age and location on the head and neck, suggesting a role of ultraviolet radiation in these lesions   Seborrheic keratoses do not harbour tumour suppressor gene mutations   Epidermal growth factor receptor inhibitors, which are used to treat some cancers, often result in an increase in verrucal (warty) keratoses  There is no easy way to remove multiple lesions on a single occasion    Unless a specific lesion is "inflamed" and is causing pain or stinging/burning or is bleeding, most insurance companies do not authorize treatment

## 2021-03-18 ENCOUNTER — TELEPHONE (OUTPATIENT)
Dept: DERMATOLOGY | Facility: CLINIC | Age: 51
End: 2021-03-18

## 2021-03-18 NOTE — TELEPHONE ENCOUNTER
Called pt to confirm that her message to cancel her appt for today has been received  Her appt has been canceled  Pt will call us back if her rash returns

## 2021-06-10 ENCOUNTER — OFFICE VISIT (OUTPATIENT)
Dept: INTERNAL MEDICINE CLINIC | Facility: CLINIC | Age: 51
End: 2021-06-10
Payer: COMMERCIAL

## 2021-06-10 VITALS
HEIGHT: 62 IN | SYSTOLIC BLOOD PRESSURE: 118 MMHG | DIASTOLIC BLOOD PRESSURE: 70 MMHG | HEART RATE: 74 BPM | OXYGEN SATURATION: 98 % | BODY MASS INDEX: 24.29 KG/M2 | TEMPERATURE: 98.1 F | WEIGHT: 132 LBS

## 2021-06-10 DIAGNOSIS — Z12.31 ENCOUNTER FOR SCREENING MAMMOGRAM FOR MALIGNANT NEOPLASM OF BREAST: ICD-10-CM

## 2021-06-10 DIAGNOSIS — M25.532 LEFT WRIST PAIN: Primary | ICD-10-CM

## 2021-06-10 DIAGNOSIS — Z12.11 SCREENING FOR COLON CANCER: ICD-10-CM

## 2021-06-10 DIAGNOSIS — Z11.52 ENCOUNTER FOR SCREENING FOR COVID-19: ICD-10-CM

## 2021-06-10 DIAGNOSIS — M25.832 MASS OF JOINT OF LEFT WRIST: ICD-10-CM

## 2021-06-10 PROCEDURE — 99213 OFFICE O/P EST LOW 20 MIN: CPT | Performed by: NURSE PRACTITIONER

## 2021-06-10 PROCEDURE — 3725F SCREEN DEPRESSION PERFORMED: CPT | Performed by: NURSE PRACTITIONER

## 2021-06-10 PROCEDURE — 1036F TOBACCO NON-USER: CPT | Performed by: NURSE PRACTITIONER

## 2021-06-10 PROCEDURE — 3008F BODY MASS INDEX DOCD: CPT | Performed by: NURSE PRACTITIONER

## 2021-06-10 NOTE — ASSESSMENT & PLAN NOTE
Check XR  Continue NSAIDs prn  Follow up in 2-4 weeks Therapy Recommended - Drug indicated but not ordered

## 2021-06-10 NOTE — PROGRESS NOTES
Assessment/Plan:    Problem List Items Addressed This Visit        Other    Left wrist pain - Primary     Check XR  Continue NSAIDs prn  Follow up in 2-4 weeks          Relevant Orders    XR wrist 3+ vw left    Mass of joint of left wrist     Check XR  Continue NSAIDs prn  Follow up in 2-4 weeks          Relevant Orders    XR wrist 3+ vw left      Other Visit Diagnoses     Encounter for screening mammogram for malignant neoplasm of breast        Relevant Orders    Mammo screening bilateral w cad    Screening for colon cancer        Relevant Orders    Ambulatory referral for colonoscopy    Encounter for screening for COVID-19        Relevant Orders    Novel Coronavirus (COVID-19), PCR [facility] Collected at [facility]      will follow up to [facility]     BMI Counseling: Body mass index is 24 14 kg/m²  BMI normal     M*Modal software was used to dictate this note  It may contain errors with dictating incorrect words or incorrect spelling  Please contact the provider directly with any questions  Subjective:      Patient ID: Markus Delong is a 45 y o  female  HPI     Patient presents today with complaints of left wrist pain  The pain started 2 months ago, denies injury  Lump on radial side of wrist that started at the same time as the pain  The lump changes in size, the larger the lump the more the pain  The pain is 7-8/10 on the worst days  She has tried tylenol with minimal relief  More relief with motrin  Limited ROM and fine motor function when the lump is larger secondary to more pain  When the lump is larger in size she is unable to grasp small times with fingers  She is traveling to the [facility] later this month and needs a COVID test ordered for travel  She is fully vaccinated       The following portions of the patient's history were reviewed and updated as appropriate: allergies, current medications, past family history, past medical history, past social history, past surgical history and problem list     Review of Systems   Constitutional: Negative for appetite change, chills, fatigue and fever  Musculoskeletal: Positive for arthralgias (left wrist)  Past Medical History:   Diagnosis Date    Abnormal Pap smear of cervix     6 yrs ago    Female infertility     Miscarriage     , 2016    Recurrent pregnancy loss, antepartum condition or complication     x 2 5455,-X & C; 2016-chemical pregnancy    Thalassemia     carrier    Urinary tract infection     last episode 2019    Varicella     childhood chickenpox         Current Outpatient Medications:     docusate sodium (COLACE) 100 mg capsule, Take 1 capsule (100 mg total) by mouth 2 (two) times a day, Disp: 180 each, Rfl: 0    Allergies   Allergen Reactions    Dicloxacillin Rash       Social History   Past Surgical History:   Procedure Laterality Date    DILATION AND CURETTAGE OF UTERUS      NC  DELIVERY ONLY N/A 3/18/2020    Procedure:  SECTION (); Surgeon: Anibal Hayden MD;  Location: [facility] ;  Service: Obstetrics    WISDOM TOOTH EXTRACTION       Family History   Problem Relation Age of Onset    Colon cancer Mother 70    Hypertension Mother     Diabetes Mother     No Known Problems Father     Hypertension Brother     Heart failure Maternal Grandmother     Arthritis Maternal Grandmother     Cancer Maternal Grandfather     Diabetes Maternal Grandfather     No Known Problems Paternal Grandmother     No Known Problems Paternal Grandfather        Objective:  /70 (BP Location: Left arm, Patient Position: Sitting, Cuff Size: Adult)   Pulse 74   Temp 98 1 °F (36 7 °C)   Ht 5' 2" (1 575 m)   Wt 59 9 kg (132 lb)   SpO2 98%   BMI 24 14 kg/m²      Physical Exam  Vitals signs reviewed  Constitutional:       General: She is not in acute distress  Appearance: Normal appearance  She is normal weight  She is not diaphoretic  HENT:      Head: Normocephalic     Eyes: Extraocular Movements: Extraocular movements intact  Conjunctiva/sclera: Conjunctivae normal       Pupils: Pupils are equal, round, and reactive to light  Cardiovascular:      Rate and Rhythm: Normal rate and regular rhythm  Heart sounds: Normal heart sounds  No murmur  Pulmonary:      Effort: Pulmonary effort is normal  No respiratory distress  Breath sounds: Normal breath sounds  No wheezing, rhonchi or rales  Musculoskeletal:      Right wrist: She exhibits tenderness and bony tenderness  She exhibits normal range of motion  Comments: Firm, fixed nodule on radial side of wrist  +tenderness  No erythema     Neurological:      Mental Status: She is alert and oriented to person, place, and time  Mental status is at baseline  Motor: No weakness     Psychiatric:         Mood and Affect: Mood normal          Behavior: Behavior normal

## 2021-06-14 DIAGNOSIS — Z11.52 ENCOUNTER FOR SCREENING FOR COVID-19: ICD-10-CM

## 2021-06-14 PROCEDURE — U0003 INFECTIOUS AGENT DETECTION BY NUCLEIC ACID (DNA OR RNA); SEVERE ACUTE RESPIRATORY SYNDROME CORONAVIRUS 2 (SARS-COV-2) (CORONAVIRUS DISEASE [COVID-19]), AMPLIFIED PROBE TECHNIQUE, MAKING USE OF HIGH THROUGHPUT TECHNOLOGIES AS DESCRIBED BY CMS-2020-01-R: HCPCS | Performed by: NURSE PRACTITIONER

## 2021-06-14 PROCEDURE — U0005 INFEC AGEN DETEC AMPLI PROBE: HCPCS | Performed by: NURSE PRACTITIONER

## 2021-06-15 LAB — SARS-COV-2 RNA RESP QL NAA+PROBE: NEGATIVE

## 2021-08-02 ENCOUNTER — TELEPHONE (OUTPATIENT)
Dept: OBGYN CLINIC | Facility: CLINIC | Age: 51
End: 2021-08-02

## 2021-08-02 DIAGNOSIS — N39.0 URINARY TRACT INFECTION WITH HEMATURIA, SITE UNSPECIFIED: Primary | ICD-10-CM

## 2021-08-02 DIAGNOSIS — R31.9 URINARY TRACT INFECTION WITH HEMATURIA, SITE UNSPECIFIED: Primary | ICD-10-CM

## 2021-08-02 RX ORDER — SULFAMETHOXAZOLE AND TRIMETHOPRIM 800; 160 MG/1; MG/1
TABLET ORAL
Qty: 6 TABLET | Refills: 0 | Status: SHIPPED | OUTPATIENT
Start: 2021-08-02 | End: 2021-08-05

## 2021-08-02 NOTE — TELEPHONE ENCOUNTER
Patient has uti blood in urine  [place]   Pharmacy she will use is [facility]  [address]

## 2021-08-02 NOTE — TELEPHONE ENCOUNTER
I returned pt call  Pt is having blood in urine, burning, frequency, back pain  Pt denied fever and cramping  Please advise

## 2021-08-02 NOTE — TELEPHONE ENCOUNTER
Ok to send Bactrim one po BID x 3 days as long as she is not sulfa allergic  Please make appt for yearly when she is back    Thank you!

## 2021-08-02 NOTE — TELEPHONE ENCOUNTER
Pt last visit pp 04/2020 and was to return to office for an annual visit in 3 months(july)  Please advise last annual was with MARIANO AWAD [facility] 09/06/18  Urgent care due to blood in urine and out of town?

## 2021-08-02 NOTE — TELEPHONE ENCOUNTER
Pt contacted and informed as directed  Pt agreed to plan of action and scheduled yearly with [facility] in [facility]  Pt confirmed NOT sulfa allergic and confirmed pharmacy [facility] in [address]  I advised pt sending rx  Pt as grateful  Please sign off order placed as directed

## 2021-08-17 ENCOUNTER — ANNUAL EXAM (OUTPATIENT)
Dept: OBGYN CLINIC | Facility: CLINIC | Age: 51
End: 2021-08-17
Payer: COMMERCIAL

## 2021-08-17 VITALS
BODY MASS INDEX: 24.33 KG/M2 | SYSTOLIC BLOOD PRESSURE: 106 MMHG | HEIGHT: 62 IN | DIASTOLIC BLOOD PRESSURE: 64 MMHG | WEIGHT: 132.2 LBS

## 2021-08-17 DIAGNOSIS — O24.414 INSULIN CONTROLLED GESTATIONAL DIABETES MELLITUS (GDM) IN THIRD TRIMESTER: ICD-10-CM

## 2021-08-17 DIAGNOSIS — Z12.11 COLON CANCER SCREENING: ICD-10-CM

## 2021-08-17 DIAGNOSIS — Z01.419 ENCOUNTER FOR GYNECOLOGICAL EXAMINATION WITHOUT ABNORMAL FINDING: Primary | ICD-10-CM

## 2021-08-17 PROCEDURE — 99396 PREV VISIT EST AGE 40-64: CPT | Performed by: PHYSICIAN ASSISTANT

## 2021-08-17 NOTE — PROGRESS NOTES
Penelope Floor  1970      CC:  Yearly exam    S:  45 y o  female here for yearly exam  Her cycles are regular, not heavy or crampy  Sexual activity: She is sexually active without pain, bleeding or dryness  Contraception: She uses vasectomy for contraception  Last Pap 9/6/18 neg/neg  Last Mammo 1/7/19  Last Colonoscopy never    We reviewed [facility] guidelines for Pap testing today  Family hx of breast cancer: no  Family hx of ovarian cancer: no  Family hx of colon cancer: mother      Current Outpatient Medications:     docusate sodium (COLACE) 100 mg capsule, Take 1 capsule (100 mg total) by mouth 2 (two) times a day, Disp: 180 each, Rfl: 0  Social History     Socioeconomic History    Marital status: /Civil Union     Spouse name: Not on file    Number of children: Not on file    Years of education: Not on file    Highest education level: Not on file   Occupational History    Not on file   Tobacco Use    Smoking status: Never Smoker    Smokeless tobacco: Never Used   Vaping Use    Vaping Use: Never used   Substance and Sexual Activity    Alcohol use: Not Currently     Alcohol/week: 2 0 standard drinks     Types: 2 Glasses of wine per week    Drug use: No    Sexual activity: Yes     Partners: Male     Birth control/protection: Male Sterilization     Comment: vasectomy-    Other Topics Concern    Not on file   Social History Narrative    Not on file     Social Determinants of Health     Financial Resource Strain:     Difficulty of Paying Living Expenses:    Food Insecurity:     Worried About Running Out of Food in the Last Year:     [address] in the Last Year:    Transportation Needs:     Lack of Transportation (Medical):      Lack of Transportation (Non-Medical):    Physical Activity:     Days of Exercise per Week:     Minutes of Exercise per Session:    Stress:     Feeling of Stress :    Social Connections:     Frequency of Communication with Friends and Family:     Frequency of Social Gatherings with Friends and Family:     Attends Congregational Services:     Active Member of Clubs or Organizations:     Attends Club or Organization Meetings:     Marital Status:    Intimate Partner Violence:     Fear of Current or Ex-Partner:     Emotionally Abused:     Physically Abused:     Sexually Abused:      Family History   Problem Relation Age of Onset    Colon cancer Mother 70    Hypertension Mother     Diabetes Mother     No Known Problems Father     Hypertension Brother     Heart failure Maternal Grandmother     Arthritis Maternal Grandmother     Cancer Maternal Grandfather     Diabetes Maternal Grandfather     No Known Problems Paternal Grandmother     No Known Problems Paternal Grandfather       Past Medical History:   Diagnosis Date    Abnormal Pap smear of cervix     6 yrs ago    Female infertility     Miscarriage     2015, 2016    Recurrent pregnancy loss, antepartum condition or complication     x 2 5530,-B & C; 2016-chemical pregnancy    Thalassemia     carrier    Urinary tract infection     last episode 2019    Varicella     childhood chickenpox        Review of Systems   Respiratory: Negative  Cardiovascular: Negative  Gastrointestinal: Negative for constipation and diarrhea  Genitourinary: Negative for difficulty urinating, pelvic pain, vaginal bleeding, vaginal discharge, itching or odor  O:  Blood pressure 106/64, height 5' 2" (1 575 m), weight 60 kg (132 lb 3 2 oz), last menstrual period 07/24/2021, currently breastfeeding  Patient appears well and is not in distress  Neck is supple without masses  Breasts are symmetrical without mass, tenderness, nipple discharge, skin changes or adenopathy  Abdomen is soft and nontender without masses  External genitals are normal without lesions or rashes  Urethral meatus and urethra are normal  Bladder is normal to palpation  Vagina is normal without discharge or bleeding  Cervix is normal without discharge or lesion  Uterus is normal, mobile, nontender without palpable mass  Adnexa are normal, nontender, without palpable mass  A:   Yearly exam     History of GDM    P:   Pap 9/2023   Mammo slip provided    Colonoscopy recommended, referral placed   Check 2hr gtt     RTO one year for yearly exam or sooner as needed

## 2021-09-01 ENCOUNTER — OFFICE VISIT (OUTPATIENT)
Dept: UROLOGY | Facility: AMBULATORY SURGERY CENTER | Age: 51
End: 2021-09-01
Payer: COMMERCIAL

## 2021-09-01 VITALS
SYSTOLIC BLOOD PRESSURE: 118 MMHG | DIASTOLIC BLOOD PRESSURE: 72 MMHG | HEIGHT: 62 IN | BODY MASS INDEX: 24.29 KG/M2 | HEART RATE: 70 BPM | WEIGHT: 132 LBS

## 2021-09-01 DIAGNOSIS — R32 URINARY INCONTINENCE, UNSPECIFIED TYPE: ICD-10-CM

## 2021-09-01 LAB
BACTERIA UR QL AUTO: NORMAL /HPF
BILIRUB UR QL STRIP: NEGATIVE
CLARITY UR: CLEAR
COLOR UR: YELLOW
GLUCOSE UR STRIP-MCNC: NEGATIVE MG/DL
HGB UR QL STRIP.AUTO: NEGATIVE
HYALINE CASTS #/AREA URNS LPF: NORMAL /LPF
KETONES UR STRIP-MCNC: NEGATIVE MG/DL
LEUKOCYTE ESTERASE UR QL STRIP: NEGATIVE
NITRITE UR QL STRIP: NEGATIVE
NON-SQ EPI CELLS URNS QL MICRO: NORMAL /HPF
PH UR STRIP.AUTO: 6.5 [PH]
POST-VOID RESIDUAL VOLUME, ML POC: 92 ML
PROT UR STRIP-MCNC: NEGATIVE MG/DL
RBC #/AREA URNS AUTO: NORMAL /HPF
SL AMB  POCT GLUCOSE, UA: NORMAL
SL AMB LEUKOCYTE ESTERASE,UA: NORMAL
SL AMB POCT BILIRUBIN,UA: NORMAL
SL AMB POCT BLOOD,UA: NORMAL
SL AMB POCT CLARITY,UA: CLEAR
SL AMB POCT COLOR,UA: YELLOW
SL AMB POCT KETONES,UA: NORMAL
SL AMB POCT NITRITE,UA: NORMAL
SL AMB POCT PH,UA: 6
SL AMB POCT SPECIFIC GRAVITY,UA: 1.01
SL AMB POCT URINE PROTEIN: NORMAL
SL AMB POCT UROBILINOGEN: 0.2
SP GR UR STRIP.AUTO: 1.01 (ref 1–1.03)
UROBILINOGEN UR QL STRIP.AUTO: 1 E.U./DL
WBC #/AREA URNS AUTO: NORMAL /HPF

## 2021-09-01 PROCEDURE — 81001 URINALYSIS AUTO W/SCOPE: CPT | Performed by: NURSE PRACTITIONER

## 2021-09-01 PROCEDURE — 99203 OFFICE O/P NEW LOW 30 MIN: CPT | Performed by: NURSE PRACTITIONER

## 2021-09-01 PROCEDURE — 3008F BODY MASS INDEX DOCD: CPT | Performed by: NURSE PRACTITIONER

## 2021-09-01 PROCEDURE — 1036F TOBACCO NON-USER: CPT | Performed by: NURSE PRACTITIONER

## 2021-09-01 PROCEDURE — 51798 US URINE CAPACITY MEASURE: CPT | Performed by: NURSE PRACTITIONER

## 2021-09-01 PROCEDURE — 81002 URINALYSIS NONAUTO W/O SCOPE: CPT | Performed by: NURSE PRACTITIONER

## 2021-09-01 RX ORDER — OXYBUTYNIN CHLORIDE 10 MG/1
10 TABLET, EXTENDED RELEASE ORAL
Qty: 30 TABLET | Refills: 3 | Status: SHIPPED | OUTPATIENT
Start: 2021-09-01 | End: 2021-12-08

## 2021-09-01 NOTE — PROGRESS NOTES
21    Kristi Garay   1970   [id number]     Assessment  1 Stress incontinence    Discussion/Plan  1 Stress incontinence   Urine dip in office: trace leukocytes, negative blood, clear yellow    Microscopic urinalysis ordered   PVR: 92   We discussed treatment options including dietary/lifestyle modifications, avoiding bladder irritating beverages, anticholinergic medications, Kegels, pelvic floor PT, InterStim, PTNS, or bladder Botox   Trial 10 mg Oxybutynin XR - we reviewed side effect profile and mechanism of action of medication   Referral to pelvic floor physical therapy    Patient will consult with pelvic floor PT and begin oxybutynin for control of symptoms  She will follow up in 3 months to review efficacy of medication and symptoms  All questions answered at this time  Subjective  HPI   Kristi Garay is a 45year old female who presents in consultation with reports of urinary incontinence  She has had worsening leakage of urine for the past year  She is wearing pads and protective underwear daily for leakage  She experiences incontinence with sneezing, coughing, laughing, running and occasionally at rest  She consumes "a couple glasses of water" daily and coffee in the morning  She denies pain, dysuria, gross hematuria, incomplete bladder emptying  She is moving her bowels without difficulty  She has a history of  and no vaginal childbirth     PMH: gestational diabetes    Review of Systems - History obtained from chart review and the patient  General ROS: negative  Psychological ROS: negative  ENT ROS: negative  Allergy and Immunology ROS: negative  Endocrine ROS: negative  Breast ROS: negative  Respiratory ROS: no cough, shortness of breath, or wheezing  Cardiovascular ROS: no chest pain or dyspnea on exertion  Gastrointestinal ROS: no abdominal pain, change in bowel habits, or black or bloody stools  Genito-Urinary ROS: positive for - incontinence  Musculoskeletal ROS: negative  Neurological ROS: negative  Dermatological ROS: negative       Objective  Physical Exam  Vitals and nursing note reviewed  Constitutional:       General: She is not in acute distress  Appearance: Normal appearance  She is normal weight  She is not ill-appearing, toxic-appearing or diaphoretic  HENT:      Head: Normocephalic and atraumatic  Pulmonary:      Effort: Pulmonary effort is normal  No respiratory distress  Abdominal:      Tenderness: There is no right CVA tenderness or left CVA tenderness  Musculoskeletal:         General: Normal range of motion  Cervical back: Normal range of motion  Skin:     General: Skin is warm and dry  Neurological:      General: No focal deficit present  Mental Status: She is alert and oriented to person, place, and time  Psychiatric:         Mood and Affect: Mood normal          Behavior: Behavior normal          Thought Content:  Thought content normal          Judgment: Judgment normal                Anthony Buchanan

## 2021-09-15 ENCOUNTER — OFFICE VISIT (OUTPATIENT)
Dept: INTERNAL MEDICINE CLINIC | Facility: CLINIC | Age: 51
End: 2021-09-15
Payer: COMMERCIAL

## 2021-09-15 VITALS
DIASTOLIC BLOOD PRESSURE: 70 MMHG | WEIGHT: 129.9 LBS | TEMPERATURE: 97.8 F | OXYGEN SATURATION: 97 % | HEART RATE: 78 BPM | SYSTOLIC BLOOD PRESSURE: 116 MMHG | BODY MASS INDEX: 24.52 KG/M2 | HEIGHT: 61 IN

## 2021-09-15 DIAGNOSIS — Z12.12 ENCOUNTER FOR COLORECTAL CANCER SCREENING: ICD-10-CM

## 2021-09-15 DIAGNOSIS — Z12.11 ENCOUNTER FOR COLORECTAL CANCER SCREENING: ICD-10-CM

## 2021-09-15 DIAGNOSIS — Z13.0 SCREENING FOR DEFICIENCY ANEMIA: ICD-10-CM

## 2021-09-15 DIAGNOSIS — Z12.31 ENCOUNTER FOR SCREENING MAMMOGRAM FOR MALIGNANT NEOPLASM OF BREAST: ICD-10-CM

## 2021-09-15 DIAGNOSIS — Z00.00 ANNUAL PHYSICAL EXAM: Primary | ICD-10-CM

## 2021-09-15 DIAGNOSIS — Z13.1 SCREENING FOR DIABETES MELLITUS: ICD-10-CM

## 2021-09-15 DIAGNOSIS — Z13.220 ENCOUNTER FOR SCREENING FOR LIPID DISORDER: ICD-10-CM

## 2021-09-15 PROCEDURE — 99396 PREV VISIT EST AGE 40-64: CPT | Performed by: NURSE PRACTITIONER

## 2021-09-15 PROCEDURE — 1036F TOBACCO NON-USER: CPT | Performed by: NURSE PRACTITIONER

## 2021-09-15 PROCEDURE — 3008F BODY MASS INDEX DOCD: CPT | Performed by: NURSE PRACTITIONER

## 2021-09-15 NOTE — PROGRESS NOTES
ADULT ANNUAL [id number] [facility] [place]    NAME: Carlos Manuel Cardona  AGE: 45 y o  SEX: female  : 1970     DATE: 9/15/2021     Assessment and Plan:     Problem List Items Addressed This Visit        Other    Annual physical exam - Primary     Healthy 45 yr old female  Check baseline labs   PAP up to date   schedule mammogram - ordered  schedule colonoscopy - referral given  Follow up in 1 year, sooner if needed            Other Visit Diagnoses     Encounter for screening mammogram for malignant neoplasm of breast        Relevant Orders    Mammo screening bilateral w 3d & cad    Encounter for colorectal cancer screening        Relevant Orders    Ambulatory referral to Gastroenterology    Encounter for screening for lipid disorder        Relevant Orders    Lipid Panel with Direct LDL reflex    Screening for deficiency anemia        Relevant Orders    CBC    Screening for diabetes mellitus        Relevant Orders    Comprehensive metabolic panel          Immunizations and preventive care screenings were discussed with patient today  Appropriate education was printed on patient's after visit summary  Counseling:  · Exercise: the importance of regular exercise/physical activity was discussed  Recommend exercise 3-5 times per week for at least 30 minutes  Return in about 1 year (around 9/15/2022) for Annual physical      Chief Complaint:     Chief Complaint   Patient presents with    Physical Exam     annual physical    Health Screening     colonoscopy and mammo ordered      History of Present Illness:     Adult Annual Physical   Patient here for a comprehensive physical exam      Diet and Physical Activity  · Diet/Nutrition: well balanced diet  · Exercise: no formal exercise and active lifestyle        Depression Screening  PHQ-9 Depression Screening    PHQ-9:   Frequency of the following problems over the past two weeks: General Health  · Sleep: sleeps well  · Hearing: normal - bilateral   · Vision: most recent eye exam <1 year ago and wears contacts  · Dental: regular dental visits  /GYN Health  · Patient is: premenopausal  · Last menstrual period: 21   · Contraceptive method: ramila had a vasectomy   · Pap 18 co-testing negative  · Mammogram 2019     Review of Systems:     Review of Systems   Constitutional: Negative for activity change, appetite change, chills, diaphoresis, fatigue, fever and unexpected weight change  Eyes: Negative for visual disturbance  Respiratory: Negative for cough, chest tightness, shortness of breath and wheezing  Cardiovascular: Negative for chest pain and palpitations  Gastrointestinal: Positive for constipation (occasional)  Negative for abdominal distention, abdominal pain, blood in stool, diarrhea, nausea and vomiting  Genitourinary: Negative for difficulty urinating, dysuria, frequency, hematuria, urgency, vaginal bleeding, vaginal discharge and vaginal pain  Musculoskeletal: Negative for arthralgias, joint swelling and myalgias  Skin: Negative for rash  Neurological: Negative for dizziness, syncope, light-headedness and headaches  Psychiatric/Behavioral: Negative for dysphoric mood and sleep disturbance  The patient is not nervous/anxious         Past Medical History:     Past Medical History:   Diagnosis Date    Abnormal Pap smear of cervix     6 yrs ago    Female infertility     Miscarriage     , 2016    Recurrent pregnancy loss, antepartum condition or complication     x 2 1826,-Q & C; 2016-chemical pregnancy    Thalassemia     carrier    Urinary tract infection     last episode 2019    Varicella     childhood chickenpox      Past Surgical History:     Past Surgical History:   Procedure Laterality Date    DILATION AND CURETTAGE OF UTERUS      [place]  DELIVERY ONLY N/A 3/18/2020    Procedure:  SECTION (); Surgeon: Kailee Pires MD;  [facility] ;  Service: Obstetrics    WISDOM TOOTH EXTRACTION        Social History:     Social History     Socioeconomic History    Marital status: /Civil Union     Spouse name: None    Number of children: None    Years of education: None    Highest education level: None   Occupational History    None   Tobacco Use    Smoking status: Never Smoker    Smokeless tobacco: Never Used   Vaping Use    Vaping Use: Never used   Substance and Sexual Activity    Alcohol use: Yes     Alcohol/week: 2 0 standard drinks     Types: 2 Glasses of wine per week    Drug use: No    Sexual activity: Yes     Partners: Male     Birth control/protection: Male Sterilization     Comment: vasectomy-    Other Topics Concern    None   Social History Narrative    None     Social Determinants of Health     Financial Resource Strain:     Difficulty of Paying Living Expenses:    Food Insecurity:     Worried About Running Out of Food in the Last Year:     Ran Out of Food in the Last Year:    Transportation Needs:     Lack of Transportation (Medical):      Lack of Transportation (Non-Medical):    Physical Activity:     Days of Exercise per Week:     Minutes of Exercise per Session:    Stress:     Feeling of Stress :    Social Connections:     Frequency of Communication with Friends and Family:     Frequency of Social Gatherings with Friends and Family:     Attends [employer] Services:     Active Member of Clubs or Organizations:     Attends Club or Organization Meetings:     Marital Status:    Intimate Partner Violence:     Fear of Current or Ex-Partner:     Emotionally Abused:     Physically Abused:     Sexually Abused:       Family History:     Family History   Problem Relation Age of Onset    Colon cancer Mother 70    Hypertension Mother     Diabetes Mother     No Known Problems Father     Hypertension Brother     Heart failure Maternal Grandmother     Arthritis Maternal Grandmother     Cancer Maternal Grandfather     Diabetes Maternal Grandfather     No Known Problems Paternal Grandmother     No Known Problems Paternal Grandfather       Current Medications:     Current Outpatient Medications   Medication Sig Dispense Refill    oxybutynin (DITROPAN-XL) 10 MG 24 hr tablet Take 1 tablet (10 mg total) by mouth daily at bedtime (Patient not taking: Reported on 9/15/2021) 30 tablet 3     No current facility-administered medications for this visit  Allergies: Allergies   Allergen Reactions    Dicloxacillin Rash      Physical Exam:     /70 (BP Location: Left arm, Patient Position: Sitting, Cuff Size: Standard)   Pulse 78   Temp 97 8 °F (36 6 °C) (Tympanic)   Ht 5' 1 02" (1 55 m)   Wt 58 9 kg (129 lb 14 4 oz)   SpO2 97% Comment: room air  BMI 24 53 kg/m²     Physical Exam  Constitutional:       General: She is not in acute distress  Appearance: Normal appearance  She is normal weight  She is not diaphoretic  HENT:      Head: Normocephalic and atraumatic  Right Ear: Tympanic membrane and external ear normal       Left Ear: External ear normal       Nose: Nose normal  No congestion or rhinorrhea  Mouth/Throat:      Mouth: Mucous membranes are moist       Pharynx: Oropharynx is clear  No oropharyngeal exudate or posterior oropharyngeal erythema  Eyes:      Extraocular Movements: Extraocular movements intact  Conjunctiva/sclera: Conjunctivae normal       Pupils: Pupils are equal, round, and reactive to light  Neck:      Thyroid: No thyroid mass, thyromegaly or thyroid tenderness  Vascular: No carotid bruit  Cardiovascular:      Rate and Rhythm: Normal rate and regular rhythm  Heart sounds: Normal heart sounds  No murmur heard  Pulmonary:      Effort: Pulmonary effort is normal  No respiratory distress  Breath sounds: Normal breath sounds  No wheezing, rhonchi or rales     Abdominal:      General: Bowel sounds are normal  There is no distension  Palpations: Abdomen is soft  There is no mass  Tenderness: There is no abdominal tenderness  There is no guarding  Musculoskeletal:         General: Normal range of motion  Cervical back: Normal range of motion and neck supple  Right lower leg: No edema  Left lower leg: No edema  Lymphadenopathy:      Cervical: No cervical adenopathy  Skin:     General: Skin is warm and dry  Neurological:      Mental Status: She is alert and oriented to person, place, and time  Mental status is at baseline  Motor: No weakness  Gait: Gait normal    Psychiatric:         Mood and Affect: Mood normal          Behavior: Behavior normal          Thought Content:  Thought content normal          Judgment: Judgment normal           SCAR Sykes   [facility]

## 2021-09-15 NOTE — ASSESSMENT & PLAN NOTE
Healthy 45 yr old female  Check baseline labs   PAP up to date   schedule mammogram - ordered  schedule colonoscopy - referral given  Follow up in 1 year, sooner if needed

## 2021-09-15 NOTE — PATIENT INSTRUCTIONS

## 2021-11-24 ENCOUNTER — APPOINTMENT (OUTPATIENT)
Dept: LAB | Facility: HOSPITAL | Age: 51
End: 2021-11-24
Payer: COMMERCIAL

## 2021-11-24 DIAGNOSIS — O24.414 INSULIN CONTROLLED GESTATIONAL DIABETES MELLITUS (GDM) IN THIRD TRIMESTER: ICD-10-CM

## 2021-11-24 DIAGNOSIS — Z13.0 SCREENING FOR DEFICIENCY ANEMIA: ICD-10-CM

## 2021-11-24 DIAGNOSIS — Z13.220 ENCOUNTER FOR SCREENING FOR LIPID DISORDER: ICD-10-CM

## 2021-11-24 DIAGNOSIS — Z13.1 SCREENING FOR DIABETES MELLITUS: ICD-10-CM

## 2021-11-24 LAB
ALBUMIN SERPL BCP-MCNC: 3.5 G/DL (ref 3.5–5)
ALP SERPL-CCNC: 67 U/L (ref 46–116)
ALT SERPL W P-5'-P-CCNC: 22 U/L (ref 12–78)
ANION GAP SERPL CALCULATED.3IONS-SCNC: 4 MMOL/L (ref 4–13)
AST SERPL W P-5'-P-CCNC: 10 U/L (ref 5–45)
BILIRUB SERPL-MCNC: 2.46 MG/DL (ref 0.2–1)
BUN SERPL-MCNC: 12 MG/DL (ref 5–25)
CALCIUM SERPL-MCNC: 8.4 MG/DL (ref 8.3–10.1)
CHLORIDE SERPL-SCNC: 110 MMOL/L (ref 100–108)
CHOLEST SERPL-MCNC: 115 MG/DL
CO2 SERPL-SCNC: 26 MMOL/L (ref 21–32)
CREAT SERPL-MCNC: 0.64 MG/DL (ref 0.6–1.3)
ERYTHROCYTE [DISTWIDTH] IN BLOOD BY AUTOMATED COUNT: 16.1 % (ref 11.6–15.1)
GFR SERPL CREATININE-BSD FRML MDRD: 103 ML/MIN/1.73SQ M
GLUCOSE P FAST SERPL-MCNC: 92 MG/DL (ref 65–99)
HCT VFR BLD AUTO: 37.4 % (ref 34.8–46.1)
HDLC SERPL-MCNC: 41 MG/DL
HGB BLD-MCNC: 11.8 G/DL (ref 11.5–15.4)
LDLC SERPL CALC-MCNC: 62 MG/DL (ref 0–100)
MCH RBC QN AUTO: 20 PG (ref 26.8–34.3)
MCHC RBC AUTO-ENTMCNC: 31.6 G/DL (ref 31.4–37.4)
MCV RBC AUTO: 63 FL (ref 82–98)
PLATELET # BLD AUTO: 208 THOUSANDS/UL (ref 149–390)
POTASSIUM SERPL-SCNC: 4 MMOL/L (ref 3.5–5.3)
PROT SERPL-MCNC: 6.9 G/DL (ref 6.4–8.2)
RBC # BLD AUTO: 5.91 MILLION/UL (ref 3.81–5.12)
SODIUM SERPL-SCNC: 140 MMOL/L (ref 136–145)
TRIGL SERPL-MCNC: 60 MG/DL
WBC # BLD AUTO: 7.38 THOUSAND/UL (ref 4.31–10.16)

## 2021-11-24 PROCEDURE — 80061 LIPID PANEL: CPT

## 2021-11-24 PROCEDURE — 80053 COMPREHEN METABOLIC PANEL: CPT

## 2021-11-24 PROCEDURE — 85027 COMPLETE CBC AUTOMATED: CPT

## 2021-11-24 PROCEDURE — 36415 COLL VENOUS BLD VENIPUNCTURE: CPT

## 2021-12-08 ENCOUNTER — PREP FOR PROCEDURE (OUTPATIENT)
Dept: GASTROENTEROLOGY | Facility: CLINIC | Age: 51
End: 2021-12-08

## 2021-12-08 ENCOUNTER — OFFICE VISIT (OUTPATIENT)
Dept: INTERNAL MEDICINE CLINIC | Facility: CLINIC | Age: 51
End: 2021-12-08
Payer: COMMERCIAL

## 2021-12-08 ENCOUNTER — TELEPHONE (OUTPATIENT)
Dept: GASTROENTEROLOGY | Facility: CLINIC | Age: 51
End: 2021-12-08

## 2021-12-08 VITALS
WEIGHT: 131.4 LBS | TEMPERATURE: 96.5 F | SYSTOLIC BLOOD PRESSURE: 110 MMHG | HEART RATE: 60 BPM | DIASTOLIC BLOOD PRESSURE: 80 MMHG | BODY MASS INDEX: 24.81 KG/M2 | OXYGEN SATURATION: 99 % | HEIGHT: 61 IN

## 2021-12-08 DIAGNOSIS — Z12.11 COLON CANCER SCREENING: Primary | ICD-10-CM

## 2021-12-08 DIAGNOSIS — R21 RASH AND NONSPECIFIC SKIN ERUPTION: ICD-10-CM

## 2021-12-08 DIAGNOSIS — D56.3 THALASSEMIA TRAIT: Primary | ICD-10-CM

## 2021-12-08 PROCEDURE — 3008F BODY MASS INDEX DOCD: CPT | Performed by: STUDENT IN AN ORGANIZED HEALTH CARE EDUCATION/TRAINING PROGRAM

## 2021-12-08 PROCEDURE — 1036F TOBACCO NON-USER: CPT | Performed by: STUDENT IN AN ORGANIZED HEALTH CARE EDUCATION/TRAINING PROGRAM

## 2021-12-08 PROCEDURE — 99213 OFFICE O/P EST LOW 20 MIN: CPT | Performed by: STUDENT IN AN ORGANIZED HEALTH CARE EDUCATION/TRAINING PROGRAM

## 2021-12-08 RX ORDER — BETAMETHASONE DIPROPIONATE 0.05 %
OINTMENT (GRAM) TOPICAL 2 TIMES DAILY
Qty: 30 G | Refills: 0 | Status: SHIPPED | OUTPATIENT
Start: 2021-12-08

## 2021-12-08 RX ORDER — BETAMETHASONE DIPROPIONATE 0.5 MG/G
OINTMENT TOPICAL 2 TIMES DAILY
Qty: 30 G | Refills: 0 | Status: SHIPPED | OUTPATIENT
Start: 2021-12-08 | End: 2021-12-08

## 2022-01-21 ENCOUNTER — NEW PATIENT (OUTPATIENT)
Dept: URBAN - METROPOLITAN AREA CLINIC 6 | Facility: CLINIC | Age: 52
End: 2022-01-21

## 2022-01-21 DIAGNOSIS — H52.13: ICD-10-CM

## 2022-01-21 DIAGNOSIS — Z01.00: ICD-10-CM

## 2022-01-21 PROCEDURE — 92004 COMPRE OPH EXAM NEW PT 1/>: CPT

## 2022-01-21 PROCEDURE — 92015 DETERMINE REFRACTIVE STATE: CPT

## 2022-01-21 ASSESSMENT — VISUAL ACUITY
OD_SC: J1
OS_SC: J1
OD_CC: 20/30-1
OS_CC: 20/70-1

## 2022-01-21 ASSESSMENT — TONOMETRY
OS_IOP_MMHG: 16
OD_IOP_MMHG: 16

## 2022-03-07 ENCOUNTER — ANESTHESIA EVENT (OUTPATIENT)
Dept: GASTROENTEROLOGY | Facility: AMBULARY SURGERY CENTER | Age: 52
End: 2022-03-07

## 2022-03-07 ENCOUNTER — ANESTHESIA (OUTPATIENT)
Dept: GASTROENTEROLOGY | Facility: AMBULARY SURGERY CENTER | Age: 52
End: 2022-03-07

## 2022-03-07 ENCOUNTER — HOSPITAL ENCOUNTER (OUTPATIENT)
Dept: GASTROENTEROLOGY | Facility: AMBULARY SURGERY CENTER | Age: 52
Setting detail: OUTPATIENT SURGERY
Discharge: HOME | End: 2022-03-07
Attending: INTERNAL MEDICINE | Admitting: INTERNAL MEDICINE
Payer: COMMERCIAL

## 2022-03-07 VITALS
OXYGEN SATURATION: 100 % | BODY MASS INDEX: 24.11 KG/M2 | HEART RATE: 64 BPM | SYSTOLIC BLOOD PRESSURE: 103 MMHG | DIASTOLIC BLOOD PRESSURE: 73 MMHG | HEIGHT: 62 IN | RESPIRATION RATE: 18 BRPM | TEMPERATURE: 97 F | WEIGHT: 131 LBS

## 2022-03-07 DIAGNOSIS — Z12.11 COLON CANCER SCREENING: ICD-10-CM

## 2022-03-07 LAB
EXT PREGNANCY TEST URINE: NEGATIVE
EXT. CONTROL: NORMAL

## 2022-03-07 PROCEDURE — 81025 URINE PREGNANCY TEST: CPT | Performed by: INTERNAL MEDICINE

## 2022-03-07 PROCEDURE — G0105 COLORECTAL SCRN; HI RISK IND: HCPCS | Performed by: INTERNAL MEDICINE

## 2022-03-07 RX ORDER — LIDOCAINE HYDROCHLORIDE 20 MG/ML
INJECTION, SOLUTION EPIDURAL; INFILTRATION; INTRACAUDAL; PERINEURAL AS NEEDED
Status: DISCONTINUED | OUTPATIENT
Start: 2022-03-07 | End: 2022-03-07

## 2022-03-07 RX ORDER — PROPOFOL 10 MG/ML
INJECTION, EMULSION INTRAVENOUS AS NEEDED
Status: DISCONTINUED | OUTPATIENT
Start: 2022-03-07 | End: 2022-03-07

## 2022-03-07 RX ORDER — PROPOFOL 10 MG/ML
INJECTION, EMULSION INTRAVENOUS CONTINUOUS PRN
Status: DISCONTINUED | OUTPATIENT
Start: 2022-03-07 | End: 2022-03-07

## 2022-03-07 RX ORDER — SODIUM CHLORIDE 9 MG/ML
INJECTION, SOLUTION INTRAVENOUS CONTINUOUS PRN
Status: DISCONTINUED | OUTPATIENT
Start: 2022-03-07 | End: 2022-03-07

## 2022-03-07 RX ADMIN — PROPOFOL 100 MG: 10 INJECTION, EMULSION INTRAVENOUS at 12:27

## 2022-03-07 RX ADMIN — LIDOCAINE HYDROCHLORIDE 50 MG: 20 INJECTION, SOLUTION EPIDURAL; INFILTRATION; INTRACAUDAL at 12:27

## 2022-03-07 RX ADMIN — Medication 40 MG: at 12:32

## 2022-03-07 RX ADMIN — PROPOFOL 120 MCG/KG/MIN: 10 INJECTION, EMULSION INTRAVENOUS at 12:27

## 2022-03-07 RX ADMIN — SODIUM CHLORIDE: 0.9 INJECTION, SOLUTION INTRAVENOUS at 11:47

## 2022-03-07 NOTE — H&P
History and Physical -  [facility]  Bushra Ballesteros 45 y o  female MRN: [id number]    HPI: Bushra Ballesteros is a 45y o  year old female who presents for screening colonoscopy      Review of Systems    Historical Information   Past Medical History:   Diagnosis Date    Abnormal Pap smear of cervix     6 yrs ago    Female infertility     Miscarriage     , 2016    Recurrent pregnancy loss, antepartum condition or complication     x 2 2182,-B & C; 2016-chemical pregnancy    Thalassemia     carrier    Urinary tract infection     last episode 2019    Varicella     childhood chickenpox     Past Surgical History:   Procedure Laterality Date    DILATION AND CURETTAGE OF UTERUS  2015    EYE SURGERY      [place]  DELIV[place] N/A 3/18/2020    Procedure:  SECTION ();   Surgeon: Morgan Gibbs MD;  Location: [facility] ;  Service: Obstetrics    WISDOM TOOTH EXTRACTION       Social History   Social History     Substance and Sexual Activity   Alcohol Use Yes    Alcohol/week: 2 0 standard drinks    Types: 2 Glasses of wine per week    Comment: weekly     Social History     Substance and Sexual Activity   Drug Use No     Social History     Tobacco Use   Smoking Status Never Smoker   Smokeless Tobacco Never Used     Family History   Problem Relation Age of Onset    Colon cancer Mother 70    Hypertension Mother     Diabetes Mother     No Known Problems Father     Hypertension Brother     Heart failure Maternal Grandmother     Arthritis Maternal Grandmother     Cancer Maternal Grandfather     Diabetes Maternal Grandfather     No Known Problems Paternal Grandmother     No Known Problems Paternal Grandfather        Meds/Allergies     (Not in a hospital admission)      Allergies   Allergen Reactions    Dicloxacillin Rash       Objective     /56   Pulse 67   Temp (!) 97 °F (36 1 °C) (Temporal)   Resp 16   Ht 5' 2" (1 575 m)   Wt 59 4 kg (131 lb)   SpO2 100% BMI 23 96 kg/m²       PHYSICAL EXAM    Gen: NAD  CV: RRR  CHEST: Clear  ABD: soft, NT/ND  EXT: no edema  Neuro: AAO      ASSESSMENT/PLAN:  This is a 45y o  year old female here for colonoscopy biopsy and possible polypectomy    PLAN:   Procedure:  Colonoscopy with biopsy and possible polypectomy

## 2022-03-07 NOTE — ANESTHESIA PREPROCEDURE EVALUATION
Procedure:  COLONOSCOPY    Relevant Problems   HEMATOLOGY   (+) Thalassemia trait        Physical Exam    Airway    Mallampati score: II  TM Distance: >3 FB  Neck ROM: full     Dental       Cardiovascular  Cardiovascular exam normal    Pulmonary  Pulmonary exam normal     Other Findings        Anesthesia Plan  ASA Score- 2     Anesthesia Type- IV sedation with anesthesia with ASA Monitors  Additional Monitors:   Airway Plan:           Plan Factors-Exercise tolerance (METS): >4 METS  Chart reviewed  Patient summary reviewed  Patient is not a current smoker  Induction- intravenous  Postoperative Plan-     Informed Consent- Anesthetic plan and risks discussed with patient  I personally reviewed this patient with the CRNA  Discussed and agreed on the Anesthesia Plan with the CHELSEA Pleitez

## 2022-04-14 ENCOUNTER — TELEPHONE (OUTPATIENT)
Dept: INTERNAL MEDICINE CLINIC | Facility: OTHER | Age: 52
End: 2022-04-14

## 2022-04-14 NOTE — TELEPHONE ENCOUNTER
Attempted to call patient to schedule mammogram but mailbox is full and not accepting messages at this time

## 2022-06-22 ENCOUNTER — TELEPHONE (OUTPATIENT)
Dept: INTERNAL MEDICINE CLINIC | Facility: OTHER | Age: 52
End: 2022-06-22

## 2022-06-22 NOTE — TELEPHONE ENCOUNTER
Attempted to call mailbox to discuss mammogram . Pt did not answer and mailbox is full and could not leave message.

## 2022-11-14 ENCOUNTER — OFFICE VISIT (OUTPATIENT)
Dept: OBGYN CLINIC | Facility: CLINIC | Age: 52
End: 2022-11-14

## 2022-11-14 VITALS
WEIGHT: 128.8 LBS | HEIGHT: 61 IN | BODY MASS INDEX: 24.32 KG/M2 | DIASTOLIC BLOOD PRESSURE: 82 MMHG | SYSTOLIC BLOOD PRESSURE: 122 MMHG

## 2022-11-14 DIAGNOSIS — Z11.51 SCREENING FOR HUMAN PAPILLOMAVIRUS (HPV): ICD-10-CM

## 2022-11-14 DIAGNOSIS — Z12.31 ENCOUNTER FOR SCREENING MAMMOGRAM FOR MALIGNANT NEOPLASM OF BREAST: ICD-10-CM

## 2022-11-14 DIAGNOSIS — Z01.419 ENCOUNTER FOR ANNUAL ROUTINE GYNECOLOGICAL EXAMINATION: Primary | ICD-10-CM

## 2022-11-14 PROBLEM — O24.414 INSULIN CONTROLLED GESTATIONAL DIABETES MELLITUS (GDM) IN THIRD TRIMESTER: Status: RESOLVED | Noted: 2019-10-14 | Resolved: 2022-11-14

## 2022-11-14 NOTE — PROGRESS NOTES
Assessment/Plan:    1  Encounter for screening mammogram for malignant neoplasm of breast    - Mammo screening bilateral w 3d & cad; Future    2  Encounter for annual routine gynecological examination    - IGP, rfx Aptima HPV ASCU    3  Screening for human papillomavirus (HPV)    - IGP, rfx Aptima HPV ASCU        Subjective      Shivam Laguerre is a 45 y o  female who presents for annual exam  Periods are irregular, lasting 7 days  Dysmenorrhea:severe, occurring first 1-2 days of flow  Cyclic symptoms:  none  No intermenstrual bleeding, spotting, or discharge  The patient reports some mild stress incontinence  Current contraception: vasectomy  History of abnormal Pap smear: yes  Regular self breast exam: yes  History of abnormal mammogram: no  History of abnormal lipids: no    Menstrual History:  OB History        2    Para   1    Term   1            AB   1    Living   1       SAB        IAB        Ectopic        Multiple   0    Live Births   1                Patient's last menstrual period was 2022 (exact date)    Period Cycle (Days):  (Can skip months)  Period Duration (Days): 7  Period Pattern: (!) Irregular  Menstrual Flow: Heavy, Light (Heavy x2 days)  Menstrual Control: Maxi pad, Tampon  Menstrual Control Change Freq (Hours): 5-6  Dysmenorrhea: (!) Severe (Severe cramping x 2 days)  Dysmenorrhea Symptoms: Cramping    Past Medical History:   Diagnosis Date   • Abnormal Pap smear of cervix     6 yrs ago   • Female infertility    • Miscarriage     ,    • Recurrent pregnancy loss, antepartum condition or complication     x 2 ,-Q & C; 2016-chemical pregnancy   • Thalassemia     carrier   • Urinary tract infection     last episode    • Varicella     childhood chickenpox       Family History   Problem Relation Age of Onset   • Colon cancer Mother 70   • Hypertension Mother    • Diabetes Mother    • No Known Problems Father    • Hypertension Brother    • Heart failure Maternal Grandmother    • Arthritis Maternal Grandmother    • Cancer Maternal Grandfather    • Diabetes Maternal Grandfather    • No Known Problems Paternal Grandmother    • No Known Problems Paternal Grandfather        The following portions of the patient's history were reviewed and updated as appropriate: allergies, current medications, past family history, past medical history, past social history, past surgical history and problem list     Review of Systems  Pertinent items are noted in HPI  Objective      /82 (BP Location: Right arm, Patient Position: Sitting, Cuff Size: Standard)   Ht 5' 0 5" (1 537 m)   Wt 58 4 kg (128 lb 12 8 oz)   LMP 11/05/2022 (Exact Date)   BMI 24 74 kg/m²     General:   alert and oriented, in no acute distress, appears stated age and cooperative   Heart:  Breasts: regular rate and rhythm   appear normal, no suspicious masses, no skin or nipple changes or axillary nodes     Lungs: effort normal   Abdomen: soft, non-tender, without masses or organomegaly   Vulva: normal   Vagina: normal mucosa   Cervix: no lesions   Uterus: normal size, mobile, non-tender   Adnexa: normal adnexa and no mass, fullness, tenderness

## 2022-11-18 NOTE — ASSESSMENT & PLAN NOTE
[facility] - CATH LAB - Lima Memorial Current preg resulting from IVF with donor egg  Discussed recommendations for fetal echo at 24 weeks

## 2022-11-19 LAB
CYTOLOGIST CVX/VAG CYTO: NORMAL
DX ICD CODE: NORMAL
OTHER STN SPEC: NORMAL
OTHER STN SPEC: NORMAL
PATH REPORT.FINAL DX SPEC: NORMAL
SL AMB NOTE:: NORMAL
SL AMB SPECIMEN ADEQUACY: NORMAL
SL AMB TEST METHODOLOGY: NORMAL

## 2022-11-26 LAB — HPV I/H RISK 4 DNA CVX QL PROBE+SIG AMP: NEGATIVE

## 2023-02-17 ENCOUNTER — TELEPHONE (OUTPATIENT)
Dept: INTERNAL MEDICINE CLINIC | Facility: OTHER | Age: 53
End: 2023-02-17

## 2023-02-17 NOTE — TELEPHONE ENCOUNTER
LMOM for pt to call me at [facility] office    Pt is due for a physical please schedule. Pt also due for mammogram.  Please assist in scheduling.

## 2023-03-29 ENCOUNTER — OFFICE VISIT (OUTPATIENT)
Dept: INTERNAL MEDICINE CLINIC | Facility: CLINIC | Age: 53
End: 2023-03-29

## 2023-03-29 VITALS
TEMPERATURE: 98.5 F | SYSTOLIC BLOOD PRESSURE: 130 MMHG | DIASTOLIC BLOOD PRESSURE: 84 MMHG | HEART RATE: 67 BPM | WEIGHT: 130.6 LBS | BODY MASS INDEX: 24.03 KG/M2 | HEIGHT: 62 IN | OXYGEN SATURATION: 98 %

## 2023-03-29 DIAGNOSIS — Z86.32 HISTORY OF GESTATIONAL DIABETES: ICD-10-CM

## 2023-03-29 DIAGNOSIS — Z82.61 FAMILY HISTORY OF RHEUMATOID ARTHRITIS: ICD-10-CM

## 2023-03-29 DIAGNOSIS — M25.50 POLYARTHRALGIA: ICD-10-CM

## 2023-03-29 DIAGNOSIS — Z13.0 SCREENING FOR DEFICIENCY ANEMIA: ICD-10-CM

## 2023-03-29 DIAGNOSIS — Z13.220 SCREENING FOR LIPID DISORDERS: ICD-10-CM

## 2023-03-29 DIAGNOSIS — Z13.29 SCREENING FOR THYROID DISORDER: ICD-10-CM

## 2023-03-29 DIAGNOSIS — Z00.00 ANNUAL PHYSICAL EXAM: Primary | ICD-10-CM

## 2023-03-29 DIAGNOSIS — Z13.1 SCREENING FOR DIABETES MELLITUS: ICD-10-CM

## 2023-03-29 PROBLEM — R21 RASH AND NONSPECIFIC SKIN ERUPTION: Status: RESOLVED | Noted: 2021-12-08 | Resolved: 2023-03-29

## 2023-03-29 PROBLEM — R10.9 ABDOMINAL DISCOMFORT: Status: ACTIVE | Noted: 2023-03-29

## 2023-03-29 NOTE — ASSESSMENT & PLAN NOTE
- abdominal discomfort s/p 3 weeks of diarrhea, starting to improve 1 week ago    - most likely residual effects of viral infection  - continue metamucil  - recommend starting probiotic  - follow up if symptoms worsen or fail to improve

## 2023-03-29 NOTE — ASSESSMENT & PLAN NOTE
- healthy 47 yr old female  - recommend Shingrix vaccine   - update routine labs  - continue healthy diet and recommend routine exercise

## 2023-03-29 NOTE — ASSESSMENT & PLAN NOTE
- ongoing joint pain and stiffness in bilateral elbows, hands and knees  - occur in flares and then resolve   - will check RF, CCP, TOM and CRP  - follow results  - recommend stretching, exercise and NSAIDs prn

## 2023-03-29 NOTE — PROGRESS NOTES
ADULT ANNUAL [id number] PRIMARY CARE [place]    NAME: Susan Nelson  AGE: 47 y o  SEX: female  : 1970     DATE: 3/29/2023     Assessment and Plan:     Problem List Items Addressed This Visit        Other    Annual physical exam - Primary     - healthy 47 yr old female  - recommend Shingrix vaccine   - update routine labs  - continue healthy diet and recommend routine exercise           Polyarthralgia     - ongoing joint pain and stiffness in bilateral elbows, hands and knees  - occur in flares and then resolve   - will check RF, CCP, TOM and CRP  - follow results  - recommend stretching, exercise and NSAIDs prn         Relevant Orders    RF Screen w/ Reflex to Titer    TOM Screen w/ Reflex to Titer/Pattern    Cyclic citrul peptide antibody, IgG    C-reactive protein   Other Visit Diagnoses     Family history of rheumatoid arthritis        Relevant Orders    RF Screen w/ Reflex to Titer    TOM Screen w/ Reflex to Titer/Pattern    Cyclic citrul peptide antibody, IgG    C-reactive protein    Screening for thyroid disorder        Relevant Orders    TSH, 3rd generation with Free T4 reflex    Screening for diabetes mellitus        Relevant Orders    Comprehensive metabolic panel    Screening for lipid disorders        Relevant Orders    Lipid Panel with Direct LDL reflex    Screening for deficiency anemia        Relevant Orders    CBC and differential    History of gestational diabetes        Relevant Orders    Hemoglobin A1C        Pt due for mammogram  Will call to schedule, has active order    Immunizations and preventive care screenings were discussed with patient today  Appropriate education was printed on patient's after visit summary      Return in about 1 year (around 3/29/2024) for Annual physical      Chief Complaint:     Chief Complaint   Patient presents with   • Physical Exam     Annual physical   • Health Screening     Patient called to schedule mammogram today they said they will call her back  depression      History of Present Illness:     Adult Annual Physical   Patient here for a comprehensive physical exam  The patient reports joint pain in bilateral elbows, bilateral hands and bilateral knees  She states the pain comes in episodes all at once and then will get better  She denies any joint swelling  She states she has a family history of RA in her maternal aunt  She has the stiffness currently  She has had abdominal pain intermittently  About 1 month ago she had loose stools and diarrhea, yellow in color  She was having about 1-2 episodes per day for about 2-3 weeks  Symptoms started improving 1 week ago  Her appetite is normal  She continues within intermittent abdominal pain about once a day  Symptoms last about 10-15 minutes and feels like gas pain  No bloody stools  No nausea or vomiting    She reports constant fatigue     Diet and Physical Activity  · Diet/Nutrition: well balanced diet  · Exercise: no formal exercise  Depression Screening  PHQ-2/9 Depression Screening    Little interest or pleasure in doing things: 0 - not at all  Feeling down, depressed, or hopeless: 0 - not at all  PHQ-2 Score: 0  PHQ-2 Interpretation: Negative depression screen        General Health  · Sleep: sleeps poorly  · Hearing: normal - bilateral   · Vision: most recent eye exam <1 year ago and wears glasses  · Dental: regular dental visits  /GYN Health  · Patient is: perimenopausal  · Last menstrual period: Oct 2022  · Contraceptive method:  had vasctomy  · Mammogram: due  · PAP: 11/14/2022 normal, HPV negative      Review of Systems:     Review of Systems   Constitutional: Positive for fatigue  Negative for activity change, appetite change, chills, fever and unexpected weight change  HENT: Negative for congestion, rhinorrhea and sore throat      Respiratory: Negative for cough, chest tightness, shortness of breath and wheezing  Cardiovascular: Negative for chest pain and palpitations  Gastrointestinal: Positive for abdominal pain and diarrhea  Negative for abdominal distention, blood in stool, constipation, nausea and vomiting  Genitourinary: Negative for difficulty urinating, dysuria, frequency, hematuria, urgency, vaginal bleeding, vaginal discharge and vaginal pain  Musculoskeletal: Positive for arthralgias  Skin: Negative for rash  Neurological: Negative for dizziness, light-headedness and headaches  Psychiatric/Behavioral: Negative for dysphoric mood and sleep disturbance  The patient is not nervous/anxious  Past Medical History:     Past Medical History:   Diagnosis Date   • Abnormal Pap smear of cervix     6 yrs ago   • Female infertility    • Miscarriage     ,    • Recurrent pregnancy loss, antepartum condition or complication     x 2 8214,-U & C; 2016-chemical pregnancy   • Thalassemia     carrier   • Urinary tract infection     last episode    • Varicella     childhood chickenpox      Past Surgical History:     Past Surgical History:   Procedure Laterality Date   • DILATION AND CURETTAGE OF UTERUS     • EYE SURGERY     • [place]  DELIVERY ONLY N/A 2020    Procedure:  SECTION (); Surgeon: Ted Su MD;  Location: [facility] ;  Service: Obstetrics   • TOOTH EXTRACTION  2022    Molar removed   • WISDOM TOOTH EXTRACTION        Social History:     Social History     Socioeconomic History   • Marital status: /Civil Union     Spouse name: None   • Number of children: None   • Years of education: None   • Highest education level: None   Occupational History   • None   Tobacco Use   • Smoking status: Never   • Smokeless tobacco: Never   Vaping Use   • Vaping Use: Never used   Substance and Sexual Activity   • Alcohol use:  Yes     Alcohol/week: 2 0 standard drinks     Types: 2 Glasses of wine per week     Comment: occ   • Drug use: No   • Sexual "activity: Yes     Partners: Male     Birth control/protection: Male Sterilization     Comment: vasectomy-    Other Topics Concern   • None   Social History Narrative   • None     Social Determinants of Health     Financial Resource Strain: Not on file   Food Insecurity: Not on file   Transportation Needs: Not on file   Physical Activity: Not on file   Stress: Not on file   Social Connections: Not on file   Intimate Partner Violence: Not on file   Housing Stability: Not on file      Family History:     Family History   Problem Relation Age of Onset   • Hypertension Mother    • Diabetes Mother    • Cancer Mother    • No Known Problems Father    • Hypertension Brother    • Heart failure Maternal Grandmother    • Arthritis Maternal Grandmother    • Cancer Maternal Grandfather    • Diabetes Maternal Grandfather    • No Known Problems Paternal Grandmother    • No Known Problems Paternal Grandfather       Current Medications:     No current outpatient medications on file  No current facility-administered medications for this visit  Allergies: Allergies   Allergen Reactions   • Dicloxacillin Rash      Physical Exam:     /84 (BP Location: Left arm, Patient Position: Sitting, Cuff Size: Standard)   Pulse 67   Temp 98 5 °F (36 9 °C) (Temporal)   Ht 5' 1 93\" (1 573 m)   Wt 59 2 kg (130 lb 9 6 oz)   SpO2 98%   BMI 23 94 kg/m²     Physical Exam  Vitals and nursing note reviewed  Constitutional:       General: She is not in acute distress  Appearance: Normal appearance  She is well-developed and normal weight  She is not diaphoretic  HENT:      Head: Normocephalic and atraumatic  Right Ear: Tympanic membrane and external ear normal       Left Ear: Tympanic membrane and external ear normal       Nose: Nose normal  No congestion or rhinorrhea  Mouth/Throat:      Mouth: Mucous membranes are moist       Pharynx: Oropharynx is clear   No oropharyngeal exudate or posterior oropharyngeal " "erythema  Eyes:      Extraocular Movements: Extraocular movements intact  Conjunctiva/sclera: Conjunctivae normal       Pupils: Pupils are equal, round, and reactive to light  Neck:      Thyroid: No thyroid mass, thyromegaly or thyroid tenderness  Vascular: No carotid bruit  Cardiovascular:      Rate and Rhythm: Normal rate and regular rhythm  Heart sounds: Normal heart sounds  No murmur heard  Pulmonary:      Effort: Pulmonary effort is normal  No respiratory distress  Breath sounds: Normal breath sounds  No wheezing, rhonchi or rales  Abdominal:      General: Bowel sounds are normal  There is no distension  Palpations: Abdomen is soft  There is no mass  Tenderness: There is abdominal tenderness (\"pressure\" upper abdomen)  There is no guarding  Musculoskeletal:         General: No swelling  Cervical back: Neck supple  Right lower leg: No edema  Left lower leg: No edema  Lymphadenopathy:      Upper Body:      Right upper body: No supraclavicular, axillary, pectoral or epitrochlear adenopathy  Left upper body: No supraclavicular, axillary, pectoral or epitrochlear adenopathy  Skin:     General: Skin is warm and dry  Capillary Refill: Capillary refill takes less than 2 seconds  Neurological:      Mental Status: She is alert and oriented to person, place, and time  Mental status is at baseline  Gait: Gait normal    Psychiatric:         Mood and Affect: Mood normal          Behavior: Behavior normal          Thought Content:  Thought content normal          Judgment: Judgment normal           SCAR Beaver  [facility]  "

## 2023-04-03 ENCOUNTER — HOSPITAL ENCOUNTER (OUTPATIENT)
Dept: RADIOLOGY | Facility: IMAGING CENTER | Age: 53
Discharge: HOME | End: 2023-04-03

## 2023-04-03 VITALS — WEIGHT: 130 LBS | HEIGHT: 62 IN | BODY MASS INDEX: 23.92 KG/M2

## 2023-04-03 DIAGNOSIS — Z12.31 ENCOUNTER FOR SCREENING MAMMOGRAM FOR MALIGNANT NEOPLASM OF BREAST: ICD-10-CM

## 2023-10-17 ENCOUNTER — OFFICE VISIT (OUTPATIENT)
Dept: URGENT CARE | Facility: MEDICAL CENTER | Age: 53
End: 2023-10-17
Payer: COMMERCIAL

## 2023-10-17 VITALS
RESPIRATION RATE: 18 BRPM | HEIGHT: 62 IN | DIASTOLIC BLOOD PRESSURE: 90 MMHG | OXYGEN SATURATION: 98 % | BODY MASS INDEX: 23.92 KG/M2 | SYSTOLIC BLOOD PRESSURE: 165 MMHG | TEMPERATURE: 97.8 F | WEIGHT: 130 LBS | HEART RATE: 67 BPM

## 2023-10-17 DIAGNOSIS — S76.111A STRAIN OF RIGHT QUADRICEPS, INITIAL ENCOUNTER: Primary | ICD-10-CM

## 2023-10-17 PROCEDURE — 99213 OFFICE O/P EST LOW 20 MIN: CPT | Performed by: PHYSICIAN ASSISTANT

## 2023-10-17 RX ORDER — METHOCARBAMOL 500 MG/1
500 TABLET, FILM COATED ORAL 4 TIMES DAILY
Qty: 20 TABLET | Refills: 0 | Status: SHIPPED | OUTPATIENT
Start: 2023-10-17 | End: 2023-10-22

## 2023-10-17 NOTE — PROGRESS NOTES
[facility]        NAME: Shana Marcus is a 47 y.o. female  : 1970    MRN: [id number]  DATE: 2023  TIME: 6:58 PM    Assessment and Plan   Strain of right quadriceps, initial encounter [S76.111A]  1. Strain of right quadriceps, initial encounter  Ambulatory Referral to Orthopedic Surgery            Patient Instructions     Sprain quadricep  Robaxin as directed-may become drowsy  Follow up with PCP in 3-5 days. Proceed to  ER if symptoms worsen. Chief Complaint     Chief Complaint   Patient presents with    Leg Pain     Pt reports having right thigh and leg pain x9 days described as regular soreness, then this weekend her hip pain got much worse while she was exiting a pool from the side. Pt reports it first started after she completed a ten mile run. Pt gets sharp pains in the hip, thigh and lower back with certain movements. History of Present Illness       58-year-old female who presents complaining of right leg pain x9 days. Patient states that it all started with soreness to the quadriceps after she had gone running. Later she was stepping out of a pool and as she put weight on that leg she felt a sharp pain. States that the pain is worse with flexion and extension of the leg. Denies direct trauma, fall, fevers    Leg Pain         Review of Systems   Review of Systems   Constitutional: Negative. HENT: Negative. Eyes: Negative. Respiratory: Negative. Negative for cough, chest tightness, shortness of breath, wheezing and stridor. Cardiovascular: Negative. Negative for chest pain, palpitations and leg swelling. Musculoskeletal:  Positive for myalgias. Current Medications     No current outpatient medications on file.     Current Allergies     Allergies as of 10/17/2023 - Reviewed 10/17/2023   Allergen Reaction Noted    Dicloxacillin Rash 2020            The following portions of the patient's history were reviewed and updated as appropriate: allergies, current medications, past family history, past medical history, past social history, past surgical history and problem list.     Past Medical History:   Diagnosis Date    Abnormal Pap smear of cervix     6 yrs ago    Female infertility     Miscarriage     , 2016    Recurrent pregnancy loss, antepartum condition or complication     x 2 3883,-T & C; 2016-chemical pregnancy    Thalassemia     carrier    Urinary tract infection     last episode 2019    Varicella     childhood chickenpox       Past Surgical History:   Procedure Laterality Date    DILATION AND CURETTAGE OF UTERUS      EYE SURGERY      [place]  DELIVERY ONLY N/A 2020    Procedure:  SECTION (); Surgeon: Irwin Brown MD;  Location: [facility] ;  Service: Obstetrics    TOOTH EXTRACTION  2022    Molar removed    WISDOM TOOTH EXTRACTION         Family History   Problem Relation Age of Onset    Hypertension Mother     Diabetes Mother     Cancer Mother     No Known Problems Father     Heart failure Maternal Grandmother     Arthritis Maternal Grandmother     Cancer Maternal Grandfather     Diabetes Maternal Grandfather     No Known Problems Paternal Grandmother     No Known Problems Paternal Grandfather     Hypertension Brother          Medications have been verified. Objective   /90   Pulse 67   Temp 97.8 °F (36.6 °C) (Temporal)   Resp 18   Ht 5' 2" (1.575 m)   Wt 59 kg (130 lb)   LMP 2022 (Exact Date)   SpO2 98%   BMI 23.78 kg/m²        Physical Exam     Physical Exam  Constitutional:       Appearance: Normal appearance. She is well-developed. HENT:      Head: Normocephalic and atraumatic. Right Ear: External ear normal.      Left Ear: External ear normal.      Nose: Nose normal.      Mouth/Throat:      Pharynx: No oropharyngeal exudate. Cardiovascular:      Rate and Rhythm: Normal rate and regular rhythm. Heart sounds: Normal heart sounds.    Pulmonary: Effort: Pulmonary effort is normal. No respiratory distress. Breath sounds: Normal breath sounds. No wheezing or rales. Chest:      Chest wall: No tenderness. Musculoskeletal:      Cervical back: Normal range of motion and neck supple. Legs:    Lymphadenopathy:      Cervical: No cervical adenopathy. Neurological:      Mental Status: She is alert.

## 2023-10-17 NOTE — PATIENT INSTRUCTIONS
Sprain quadricep  Robaxin as directed-may become drowsy  Follow up with PCP in 3-5 days. Proceed to  ER if symptoms worsen.

## 2024-01-06 ENCOUNTER — APPOINTMENT (EMERGENCY)
Dept: RADIOLOGY | Facility: HOSPITAL | Age: 54
DRG: 313 | End: 2024-01-06
Payer: COMMERCIAL

## 2024-01-06 ENCOUNTER — HOSPITAL ENCOUNTER (INPATIENT)
Facility: HOSPITAL | Age: 54
LOS: 2 days | Discharge: HOME | DRG: 313 | End: 2024-01-08
Attending: EMERGENCY MEDICINE | Admitting: INTERNAL MEDICINE
Payer: COMMERCIAL

## 2024-01-06 DIAGNOSIS — R94.39 ABNORMAL CARDIOVASCULAR STRESS TEST: ICD-10-CM

## 2024-01-06 DIAGNOSIS — R07.9 CHEST PAIN: Primary | ICD-10-CM

## 2024-01-06 DIAGNOSIS — R06.09 DYSPNEA ON EXERTION: ICD-10-CM

## 2024-01-06 LAB
2HR DELTA HS TROPONIN: 0 NG/L
4HR DELTA HS TROPONIN: 0 NG/L
ALBUMIN SERPL BCP-MCNC: 4.2 G/DL (ref 3.5–5)
ALP SERPL-CCNC: 55 U/L (ref 34–104)
ALT SERPL W P-5'-P-CCNC: 14 U/L (ref 7–52)
ANION GAP SERPL CALCULATED.3IONS-SCNC: 8 MMOL/L
APTT PPP: 27 SECONDS (ref 23–37)
APTT PPP: 58 SECONDS (ref 23–37)
AST SERPL W P-5'-P-CCNC: 18 U/L (ref 13–39)
ATRIAL RATE: 65 BPM
BASOPHILS # BLD AUTO: 0.05 THOUSANDS/ÂΜL (ref 0–0.1)
BASOPHILS NFR BLD AUTO: 1 % (ref 0–1)
BILIRUB SERPL-MCNC: 1.95 MG/DL (ref 0.2–1)
BUN SERPL-MCNC: 12 MG/DL (ref 5–25)
CALCIUM SERPL-MCNC: 9.1 MG/DL (ref 8.4–10.2)
CARDIAC TROPONIN I PNL SERPL HS: 4 NG/L
CHLORIDE SERPL-SCNC: 107 MMOL/L (ref 96–108)
CHOLEST SERPL-MCNC: 124 MG/DL
CO2 SERPL-SCNC: 26 MMOL/L (ref 21–32)
CREAT SERPL-MCNC: 0.79 MG/DL (ref 0.6–1.3)
EOSINOPHIL # BLD AUTO: 0.23 THOUSAND/ÂΜL (ref 0–0.61)
EOSINOPHIL NFR BLD AUTO: 4 % (ref 0–6)
ERYTHROCYTE [DISTWIDTH] IN BLOOD BY AUTOMATED COUNT: 15.9 % (ref 11.6–15.1)
EST. AVERAGE GLUCOSE BLD GHB EST-MCNC: 100 MG/DL
GFR SERPL CREATININE-BSD FRML MDRD: 85 ML/MIN/1.73SQ M
GLUCOSE SERPL-MCNC: 96 MG/DL (ref 65–140)
HBA1C MFR BLD: 5.1 %
HCT VFR BLD AUTO: 35.9 % (ref 34.8–46.1)
HDLC SERPL-MCNC: 37 MG/DL
HGB BLD-MCNC: 11.4 G/DL (ref 11.5–15.4)
IMM GRANULOCYTES # BLD AUTO: 0.01 THOUSAND/UL (ref 0–0.2)
IMM GRANULOCYTES NFR BLD AUTO: 0 % (ref 0–2)
INR PPP: 1.07 (ref 0.84–1.19)
LDLC SERPL CALC-MCNC: 75 MG/DL (ref 0–100)
LYMPHOCYTES # BLD AUTO: 1.55 THOUSANDS/ÂΜL (ref 0.6–4.47)
LYMPHOCYTES NFR BLD AUTO: 27 % (ref 14–44)
MCH RBC QN AUTO: 19.9 PG (ref 26.8–34.3)
MCHC RBC AUTO-ENTMCNC: 31.8 G/DL (ref 31.4–37.4)
MCV RBC AUTO: 63 FL (ref 82–98)
MONOCYTES # BLD AUTO: 0.35 THOUSAND/ÂΜL (ref 0.17–1.22)
MONOCYTES NFR BLD AUTO: 6 % (ref 4–12)
NEUTROPHILS # BLD AUTO: 3.6 THOUSANDS/ÂΜL (ref 1.85–7.62)
NEUTS SEG NFR BLD AUTO: 62 % (ref 43–75)
NRBC BLD AUTO-RTO: 0 /100 WBCS
P AXIS: 23 DEGREES
PLATELET # BLD AUTO: 195 THOUSANDS/UL (ref 149–390)
POTASSIUM SERPL-SCNC: 4.1 MMOL/L (ref 3.5–5.3)
PR INTERVAL: 130 MS
PROT SERPL-MCNC: 7.1 G/DL (ref 6.4–8.4)
PROTHROMBIN TIME: 13.8 SECONDS (ref 11.6–14.5)
QRS AXIS: 75 DEGREES
QRSD INTERVAL: 80 MS
QT INTERVAL: 468 MS
QTC INTERVAL: 486 MS
RBC # BLD AUTO: 5.72 MILLION/UL (ref 3.81–5.12)
SODIUM SERPL-SCNC: 141 MMOL/L (ref 135–147)
T WAVE AXIS: 5 DEGREES
TRIGL SERPL-MCNC: 62 MG/DL
TSH SERPL DL<=0.05 MIU/L-ACNC: 1.75 UIU/ML (ref 0.45–4.5)
VENTRICULAR RATE: 65 BPM
WBC # BLD AUTO: 5.79 THOUSAND/UL (ref 4.31–10.16)

## 2024-01-06 PROCEDURE — 99285 EMERGENCY DEPT VISIT HI MDM: CPT

## 2024-01-06 PROCEDURE — 85730 THROMBOPLASTIN TIME PARTIAL: CPT | Performed by: INTERNAL MEDICINE

## 2024-01-06 PROCEDURE — 84443 ASSAY THYROID STIM HORMONE: CPT

## 2024-01-06 PROCEDURE — 83036 HEMOGLOBIN GLYCOSYLATED A1C: CPT

## 2024-01-06 PROCEDURE — 36415 COLL VENOUS BLD VENIPUNCTURE: CPT

## 2024-01-06 PROCEDURE — 80061 LIPID PANEL: CPT

## 2024-01-06 PROCEDURE — 93005 ELECTROCARDIOGRAM TRACING: CPT

## 2024-01-06 PROCEDURE — 99285 EMERGENCY DEPT VISIT HI MDM: CPT | Performed by: EMERGENCY MEDICINE

## 2024-01-06 PROCEDURE — 85730 THROMBOPLASTIN TIME PARTIAL: CPT

## 2024-01-06 PROCEDURE — 93010 ELECTROCARDIOGRAM REPORT: CPT | Performed by: INTERNAL MEDICINE

## 2024-01-06 PROCEDURE — 99223 1ST HOSP IP/OBS HIGH 75: CPT | Performed by: INTERNAL MEDICINE

## 2024-01-06 PROCEDURE — 80053 COMPREHEN METABOLIC PANEL: CPT

## 2024-01-06 PROCEDURE — 84484 ASSAY OF TROPONIN QUANT: CPT

## 2024-01-06 PROCEDURE — 85610 PROTHROMBIN TIME: CPT

## 2024-01-06 PROCEDURE — 71046 X-RAY EXAM CHEST 2 VIEWS: CPT

## 2024-01-06 PROCEDURE — 85025 COMPLETE CBC W/AUTO DIFF WBC: CPT

## 2024-01-06 RX ORDER — HEPARIN SODIUM 1000 [USP'U]/ML
3300 INJECTION, SOLUTION INTRAVENOUS; SUBCUTANEOUS ONCE
Status: COMPLETED | OUTPATIENT
Start: 2024-01-06 | End: 2024-01-06

## 2024-01-06 RX ORDER — ATORVASTATIN CALCIUM 40 MG/1
40 TABLET, FILM COATED ORAL
Status: DISCONTINUED | OUTPATIENT
Start: 2024-01-06 | End: 2024-01-07

## 2024-01-06 RX ORDER — HEPARIN SODIUM 1000 [USP'U]/ML
3300 INJECTION, SOLUTION INTRAVENOUS; SUBCUTANEOUS EVERY 6 HOURS PRN
Status: DISCONTINUED | OUTPATIENT
Start: 2024-01-06 | End: 2024-01-07

## 2024-01-06 RX ORDER — HEPARIN SODIUM 10000 [USP'U]/100ML
3-20 INJECTION, SOLUTION INTRAVENOUS
Status: DISCONTINUED | OUTPATIENT
Start: 2024-01-06 | End: 2024-01-07

## 2024-01-06 RX ORDER — HEPARIN SODIUM 1000 [USP'U]/ML
1650 INJECTION, SOLUTION INTRAVENOUS; SUBCUTANEOUS EVERY 6 HOURS PRN
Status: DISCONTINUED | OUTPATIENT
Start: 2024-01-06 | End: 2024-01-07

## 2024-01-06 RX ORDER — ASPIRIN 81 MG/1
325 TABLET, CHEWABLE ORAL DAILY
Status: DISCONTINUED | OUTPATIENT
Start: 2024-01-07 | End: 2024-01-06

## 2024-01-06 RX ORDER — ACETAMINOPHEN 325 MG/1
650 TABLET ORAL ONCE
Status: COMPLETED | OUTPATIENT
Start: 2024-01-06 | End: 2024-01-06

## 2024-01-06 RX ORDER — ACETAMINOPHEN 325 MG/1
975 TABLET ORAL EVERY 8 HOURS PRN
Status: DISCONTINUED | OUTPATIENT
Start: 2024-01-06 | End: 2024-01-08 | Stop reason: HOSPADM

## 2024-01-06 RX ORDER — ASPIRIN 325 MG
325 TABLET ORAL ONCE
Status: DISCONTINUED | OUTPATIENT
Start: 2024-01-06 | End: 2024-01-06

## 2024-01-06 RX ORDER — NITROGLYCERIN 0.4 MG/1
0.4 TABLET SUBLINGUAL
Status: DISCONTINUED | OUTPATIENT
Start: 2024-01-06 | End: 2024-01-08 | Stop reason: HOSPADM

## 2024-01-06 RX ORDER — ASPIRIN 81 MG/1
325 TABLET, CHEWABLE ORAL ONCE
Status: COMPLETED | OUTPATIENT
Start: 2024-01-06 | End: 2024-01-06

## 2024-01-06 RX ADMIN — Medication 12.5 MG: at 21:50

## 2024-01-06 RX ADMIN — HEPARIN SODIUM 3300 UNITS: 1000 INJECTION INTRAVENOUS; SUBCUTANEOUS at 15:41

## 2024-01-06 RX ADMIN — ATORVASTATIN CALCIUM 40 MG: 40 TABLET, FILM COATED ORAL at 15:41

## 2024-01-06 RX ADMIN — ASPIRIN 81 MG CHEWABLE TABLET 325 MG: 81 TABLET CHEWABLE at 16:22

## 2024-01-06 RX ADMIN — HEPARIN SODIUM 1650 UNITS: 1000 INJECTION INTRAVENOUS; SUBCUTANEOUS at 22:35

## 2024-01-06 RX ADMIN — HEPARIN SODIUM 12 UNITS/KG/HR: 10000 INJECTION, SOLUTION INTRAVENOUS at 15:40

## 2024-01-06 RX ADMIN — ACETAMINOPHEN 650 MG: 325 TABLET, FILM COATED ORAL at 12:35

## 2024-01-06 NOTE — H&P
INTERNAL MEDICINE RESIDENCY ADMISSION H&P     Name: Floridalma Davison   Age & Sex: 53 y.o. female   MRN: [id number]  Unit/Bed#: ED 21   Encounter: [id number]  Primary Care Provider: SCAR Sarah    Code Status: Level 1 - Full Code  Admission Status: INPATIENT   Disposition: Patient requires Med/Surg with Telemetry    Admit to team: SOD Team C     ASSESSMENT/PLAN     Active Problems:    Chest pain    Polyarthralgia    Thalassemia alpha carrier      Chest pain  Assessment & Plan  Patient presenting with chest pain since 7 AM.  Patient states it is a pressure sensation on her left/center of her chest that has not gone away.  -Recently had a stress test done yesterday which was present at bedside due to being from an outside facility for shortness of breath over the last several months    -No official read on stress test but did note ST depressions and T wave inversions    Will start patient on heparin gtt, aspirin 325, lipitor 40, lopressor 12.5 BID  Telemetry  Cardiology consulted  A1c, lipid panel, TSH    Polyarthralgia  Assessment & Plan  Minimal past medical history apart from polyarthralgia seen by PCP.  -CRP, ESR, TOM, rheumatologic workup was ordered outpatient but this was never done  -Can do further workup outpatient    Thalassemia alpha carrier  Assessment & Plan  Patient is known to be a thalassemia alpha carrier with baseline anemia of 10-11        VTE Pharmacologic Prophylaxis: Heparin  VTE Mechanical Prophylaxis: sequential compression device    CHIEF COMPLAINT     Chief Complaint   Patient presents with    Shortness of Breath     Pt c/o chest pressure and tightness for the past few days      HISTORY OF PRESENT ILLNESS     Patient is a 53-year-old with minimal past medical history.  Patient has been experiencing shortness of breath for the last several months and had stress test at an outpatient facility yesterday.  Patient had the stress test at bedside which did indicate some ST  depressions and T wave inversions.    This morning patient states that she woke up around 7 AM with chest pain on the left side that felt like a pressure.  She rated the pain as a 2 out of 10 and nothing made this better or worse.  Patient denies any family history or past history of chest pain but more so exertional shortness of breath.  Patient has no other complaints at this time.    ED course includes troponin 4, 2 and 4 hours still pending.  Creatinine 0.79, BUN 12, K4.1, hemoglobin 11.4, MCV 63.     REVIEW OF SYSTEMS     Review of Systems   Constitutional:  Negative for activity change, diaphoresis and fatigue.   HENT: Negative.     Respiratory:  Positive for shortness of breath.    Cardiovascular:  Positive for chest pain. Negative for palpitations and leg swelling.   Gastrointestinal:  Negative for abdominal distention and abdominal pain.   Genitourinary: Negative.    Musculoskeletal: Negative.    Neurological:  Negative for dizziness and headaches.   Psychiatric/Behavioral: Negative.       OBJECTIVE     Vitals:    24 1152 24 1300   BP: 153/99 146/90   BP Location: Right arm Right arm   Pulse: 69 60   Resp: 18 17   Temp: 97.6 °F (36.4 °C)    TempSrc: Oral    SpO2: 98% 97%      Temperature:   Temp (24hrs), Av.6 °F (36.4 °C), Min:97.6 °F (36.4 °C), Max:97.6 °F (36.4 °C)    Temperature: 97.6 °F (36.4 °C)  Intake & Output:  I/O       None          Weights:        There is no height or weight on file to calculate BMI.  Weight (last 2 days)       None          Physical Exam  Vitals reviewed.   Constitutional:       Appearance: Normal appearance.   HENT:      Head: Normocephalic and atraumatic.      Mouth/Throat:      Mouth: Mucous membranes are moist.      Pharynx: Oropharynx is clear.   Cardiovascular:      Rate and Rhythm: Normal rate and regular rhythm.   Pulmonary:      Effort: Pulmonary effort is normal.      Breath sounds: Normal breath sounds.   Abdominal:      General: Abdomen is flat.  Bowel sounds are normal. There is no distension.      Tenderness: There is no abdominal tenderness.   Musculoskeletal:      Right lower leg: No edema.      Left lower leg: No edema.   Neurological:      Mental Status: She is alert and oriented to person, place, and time.   Psychiatric:         Mood and Affect: Mood normal.         Behavior: Behavior normal.       PAST MEDICAL HISTORY     Past Medical History:   Diagnosis Date    Abnormal Pap smear of cervix     6 yrs ago    Female infertility     Miscarriage     , 2016    Recurrent pregnancy loss, antepartum condition or complication     x 2 2015,-D & C; 2016-chemical pregnancy    Thalassemia     carrier    Urinary tract infection     last episode 2019    Varicella     childhood chickenpox     PAST SURGICAL HISTORY     Past Surgical History:   Procedure Laterality Date    DILATION AND CURETTAGE OF UTERUS      EYE SURGERY      [place]  DELIVERY ONLY N/A 2020    Procedure:  SECTION ();  Surgeon: Juliana Cuenca MD;  Location: [facility] ;  Service: Obstetrics    TOOTH EXTRACTION  2022    Molar removed    WISDOM TOOTH EXTRACTION       SOCIAL & FAMILY HISTORY     Social History     Substance and Sexual Activity   Alcohol Use Yes    Alcohol/week: 2.0 standard drinks of alcohol    Types: 2 Glasses of wine per week    Comment: occ       Social History     Substance and Sexual Activity   Drug Use No     Social History     Tobacco Use   Smoking Status Never   Smokeless Tobacco Never     Family History   Problem Relation Age of Onset    Hypertension Mother     Diabetes Mother     Cancer Mother     No Known Problems Father     Heart failure Maternal Grandmother     Arthritis Maternal Grandmother     Cancer Maternal Grandfather     Diabetes Maternal Grandfather     No Known Problems Paternal Grandmother     No Known Problems Paternal Grandfather     Hypertension Brother      LABORATORY DATA     Labs: I have personally reviewed pertinent  reports.    Results from last 7 days   Lab Units 01/06/24  1248   WBC Thousand/uL 5.79   HEMOGLOBIN g/dL 11.4*   HEMATOCRIT % 35.9   PLATELETS Thousands/uL 195   NEUTROS PCT % 62   MONOS PCT % 6   EOS PCT % 4      Results from last 7 days   Lab Units 01/06/24  1220   POTASSIUM mmol/L 4.1   CHLORIDE mmol/L 107   CO2 mmol/L 26   BUN mg/dL 12   CREATININE mg/dL 0.79   CALCIUM mg/dL 9.1   ALK PHOS U/L 55   ALT U/L 14   AST U/L 18                          Micro:  Lab Results   Component Value Date    URINECX No Growth <1000 cfu/mL 09/09/2019    URINECX <10,000 cfu/ml 09/06/2018     IMAGING & DIAGNOSTIC TESTS     Imaging: I have personally reviewed pertinent reports.    No results found.  EKG, Pathology, and Other Studies: I have personally reviewed pertinent reports.     ALLERGIES     Allergies   Allergen Reactions    Dicloxacillin Rash     MEDICATIONS PRIOR TO ARRIVAL     Prior to Admission medications    Medication Sig Start Date End Date Taking? Authorizing Provider   methocarbamol (ROBAXIN) 500 mg tablet Take 1 tablet (500 mg total) by mouth 4 (four) times a day for 5 days 10/17/23 10/22/23  Giuseppe Beaver PA-C     MEDICATIONS ADMINISTERED IN LAST 24 HOURS     Medication Administration - last 24 hours from 01/05/2024 1454 to 01/06/2024 1454         Date/Time Order Dose Route Action Action by     01/06/2024 1235 EST acetaminophen (TYLENOL) tablet 650 mg 650 mg Oral Given Booker Rojas RN          CURRENT MEDICATIONS     Current Facility-Administered Medications   Medication Dose Route Frequency Provider Last Rate    [START ON 1/7/2024] aspirin  325 mg Oral Daily Real Fraser MD      atorvastatin  40 mg Oral Daily With Dinner Real Fraser MD      heparin (porcine)  3-20 Units/kg/hr (Order-Specific) Intravenous Titrated Real Fraser MD      heparin (porcine)  1,650 Units Intravenous Q6H PRN Real Fraser MD      heparin (porcine)  3,300 Units Intravenous Once Real Fraser MD      heparin (porcine)  3,300  "Units Intravenous Q6H PRN Real Fraser MD      metoprolol tartrate  12.5 mg Oral Q12H KARSON Real Fraser MD       heparin (porcine), 3-20 Units/kg/hr (Order-Specific)      heparin (porcine), 1,650 Units, Q6H PRN  heparin (porcine), 3,300 Units, Q6H PRN        Admission Time  I spent 30 minutes admitting the patient.  This involved direct patient contact where I performed a full history and physical, reviewing previous records, and reviewing laboratory and other diagnostic studies.    Portions of the record may have been created with voice recognition software.  Occasional wrong word or \"sound a like\" substitutions may have occurred due to the inherent limitations of voice recognition software.  Read the chart carefully and recognize, using context, where substitutions have occurred.    ==  Real Fraser MD  [facility]  Internal Medicine Residency PGY-2   "

## 2024-01-06 NOTE — ED PROVIDER NOTES
History  Chief Complaint   Patient presents with    Shortness of Breath     Pt c/o chest pressure and tightness for the past few days     HPI  ED Course as of 01/06/24 1440   Sat Jan 06, 2024   1216 HPI: Patient is a 53 y.o. female with PMHx anemia otherwise no significant history who presents to the ED with  for evaluation of chest pain described as tightness that began at 7am today. States she has also had intermittent SOB, fatigue, and headaches shortly after waking up for several months. Had stress test performed at 's job with ST depressions and T-wave inversions on study yesterday. Woke up with chest pain this AM so came to the ED. No cardiac history or medications. Family History including HTN and DM. Denies any other complaints or concerns at this time.   1216 ROS: +chest pain   1216 PHYSICAL EXAM:  General: VSS, NAD, awake, alert. Speaking normally in full sentences.   Head: Normocephalic, atraumatic.  Eyes: PERRL, EOM-I. No diplopia.    ENT: Atraumatic external nose and ears. No malocclusion. No stridor. Normal phonation. No drooling. Normal swallowing.   Neck: Symmetric, trachea midline. No JVD.  CV: RRR. No murmurs or gallops. Peripheral pulses +2 throughout. No chest wall tenderness.   Lungs: Unlabored. No retractions. CTA, lungs sounds equal bilateral. No tachypnea.   Abd: +BS, soft, NT/ND.   MSK: FROM, no deformity/injury.  Back: No midline tenderness.  Skin: Dry, intact.   Neuro: AAOx3, GCS 15, CN II-XII grossly intact. Motor grossly intact.  Psychiatric/Behavioral: Appropriate mood and affect   1217 ASSESSMENT: Patient is a 53 y.o. female who presents with chest pain.   DDX includes but not limited to: ACS, musculoskeletal chest wall pain, doubt pneumonia, doubt pneumothorax.   PLAN: CBC, CMP, Trop, CXR, EKG.   1300 hs TnI 0hr: 4   1305 WBC: 5.79   1305 Hemoglobin(!): 11.4   1305 Platelet Count: 195   1305 Sodium: 141   1305 Potassium: 4.1   1305 BUN: 12   1305 Creatinine: 0.79   1305  Glucose, Random: 96   1305 hs TnI 0hr: 4  Doubt ACS   1410 Case discussed with cardiology who was made aware of patient, will evaluate and give recommendations.   1420 Discussed results and plan with patient and  at bedside. Advised on need for admission at this time. All questions answered. Patient expressed verbal understanding and is agreeable with plan for admission. Case discussed with SOD who will admit patient for chest pain rule out with abnormal stress test.       Prior to Admission Medications   Prescriptions Last Dose Informant Patient Reported? Taking?   methocarbamol (ROBAXIN) 500 mg tablet   No No   Sig: Take 1 tablet (500 mg total) by mouth 4 (four) times a day for 5 days      Facility-Administered Medications: None       Past Medical History:   Diagnosis Date    Abnormal Pap smear of cervix     6 yrs ago    Female infertility     Miscarriage     , 2016    Recurrent pregnancy loss, antepartum condition or complication     x 2 ,-D & C; 2016-chemical pregnancy    Thalassemia     carrier    Urinary tract infection     last episode     Varicella     childhood chickenpox       Past Surgical History:   Procedure Laterality Date    DILATION AND CURETTAGE OF UTERUS      EYE SURGERY      OK  DELIVERY ONLY N/A 2020    Procedure:  SECTION ();  Surgeon: Juliana Cuenca MD;  Location: [facility] ;  Service: Obstetrics    TOOTH EXTRACTION  2022    Molar removed    WISDOM TOOTH EXTRACTION         Family History   Problem Relation Age of Onset    Hypertension Mother     Diabetes Mother     Cancer Mother     No Known Problems Father     Heart failure Maternal Grandmother     Arthritis Maternal Grandmother     Cancer Maternal Grandfather     Diabetes Maternal Grandfather     No Known Problems Paternal Grandmother     No Known Problems Paternal Grandfather     Hypertension Brother      I have reviewed and agree with the history as  documented.    E-Cigarette/Vaping    E-Cigarette Use Never User      E-Cigarette/Vaping Substances    Nicotine No     THC No     CBD No     Flavoring No     Other No     Unknown No      Social History     Tobacco Use    Smoking status: Never    Smokeless tobacco: Never   Vaping Use    Vaping status: Never Used   Substance Use Topics    Alcohol use: Yes     Alcohol/week: 2.0 standard drinks of alcohol     Types: 2 Glasses of wine per week     Comment: occ    Drug use: No        Review of Systems    Physical Exam  ED Triage Vitals [01/06/24 1152]   Temperature Pulse Respirations Blood Pressure SpO2   97.6 °F (36.4 °C) 69 18 153/99 98 %      Temp Source Heart Rate Source Patient Position - Orthostatic VS BP Location FiO2 (%)   Oral Monitor Lying Right arm --      Pain Score       7             Orthostatic Vital Signs  Vitals:    01/06/24 1152 01/06/24 1300   BP: 153/99 146/90   Pulse: 69 60   Patient Position - Orthostatic VS: Lying        Physical Exam    ED Medications  Medications   aspirin chewable tablet 325 mg (has no administration in time range)   heparin (porcine) injection 3,300 Units (has no administration in time range)   heparin (porcine) 25,000 units in 0.45% NaCl 250 mL infusion (premix) (has no administration in time range)   heparin (porcine) injection 3,300 Units (has no administration in time range)   heparin (porcine) injection 1,650 Units (has no administration in time range)   acetaminophen (TYLENOL) tablet 650 mg (650 mg Oral Given 1/6/24 1235)       Diagnostic Studies  Results Reviewed       Procedure Component Value Units Date/Time    APTT [[id number]] Collected: 01/06/24 1431    Lab Status: In process Specimen: Blood from Arm, Left Updated: 01/06/24 1437    Protime-INR [[id number]] Collected: 01/06/24 1431    Lab Status: In process Specimen: Blood from Arm, Left Updated: 01/06/24 1437    HS Troponin I 2hr [[id number]] Collected: 01/06/24 1431    Lab Status: In process Specimen: Blood from Arm,  Left Updated: 01/06/24 1437    TSH, 3rd generation with Free T4 reflex [[id number]]     Lab Status: No result Specimen: Blood     Lipid Panel with Direct LDL reflex [[id number]]     Lab Status: No result Specimen: Blood     Hemoglobin A1C [[id number]]     Lab Status: No result Specimen: Blood     HS Troponin I 4hr [[id number]]     Lab Status: No result Specimen: Blood     CBC and differential [[id number]]  (Abnormal) Collected: 01/06/24 1248    Lab Status: Final result Specimen: Blood from Arm, Left Updated: 01/06/24 1300     WBC 5.79 Thousand/uL      RBC 5.72 Million/uL      Hemoglobin 11.4 g/dL      Hematocrit 35.9 %      MCV 63 fL      MCH 19.9 pg      MCHC 31.8 g/dL      RDW 15.9 %      Platelets 195 Thousands/uL      nRBC 0 /100 WBCs      Neutrophils Relative 62 %      Immat GRANS % 0 %      Lymphocytes Relative 27 %      Monocytes Relative 6 %      Eosinophils Relative 4 %      Basophils Relative 1 %      Neutrophils Absolute 3.60 Thousands/µL      Immature Grans Absolute 0.01 Thousand/uL      Lymphocytes Absolute 1.55 Thousands/µL      Monocytes Absolute 0.35 Thousand/µL      Eosinophils Absolute 0.23 Thousand/µL      Basophils Absolute 0.05 Thousands/µL     HS Troponin 0hr (reflex protocol) [[id number]]  (Normal) Collected: 01/06/24 1220    Lab Status: Final result Specimen: Blood from Arm, Left Updated: 01/06/24 1300     hs TnI 0hr 4 ng/L     Comprehensive metabolic panel [[id number]]  (Abnormal) Collected: 01/06/24 1220    Lab Status: Final result Specimen: Blood from Arm, Left Updated: 01/06/24 1256     Sodium 141 mmol/L      Potassium 4.1 mmol/L      Chloride 107 mmol/L      CO2 26 mmol/L      ANION GAP 8 mmol/L      BUN 12 mg/dL      Creatinine 0.79 mg/dL      Glucose 96 mg/dL      Calcium 9.1 mg/dL      AST 18 U/L      ALT 14 U/L      Alkaline Phosphatase 55 U/L      Total Protein 7.1 g/dL      Albumin 4.2 g/dL      Total Bilirubin 1.95 mg/dL      eGFR 85 ml/min/1.73sq m     Narrative:      [employer]  [employer] guidelines for Chronic Kidney Disease (CKD):     Stage 1 with normal or high GFR (GFR > 90 mL/min/1.73 square meters)    Stage 2 Mild CKD (GFR = 60-89 mL/min/1.73 square meters)    Stage 3A Moderate CKD (GFR = 45-59 mL/min/1.73 square meters)    Stage 3B Moderate CKD (GFR = 30-44 mL/min/1.73 square meters)    Stage 4 Severe CKD (GFR = 15-29 mL/min/1.73 square meters)    Stage 5 End Stage CKD (GFR <15 mL/min/1.73 square meters)  Note: GFR calculation is accurate only with a steady state creatinine                   XR chest 2 views    (Results Pending)         Procedures  Procedures      ED Course  ED Course as of 01/06/24 1440   Sat Jan 06, 2024   1216 HPI: Patient is a 53 y.o. female with PMHx anemia otherwise no significant history who presents to the ED with  for evaluation of chest pain described as tightness that began at 7am today. States she has also had intermittent SOB, fatigue, and headaches shortly after waking up for several months. Had stress test performed at 's job with ST depressions and T-wave inversions on study yesterday. Woke up with chest pain this AM so came to the ED. No cardiac history or medications. Family History including HTN and DM. Denies any other complaints or concerns at this time.   1216 ROS: +chest pain   1216 PHYSICAL EXAM:  General: VSS, NAD, awake, alert. Speaking normally in full sentences.   Head: Normocephalic, atraumatic.  Eyes: PERRL, EOM-I. No diplopia.    ENT: Atraumatic external nose and ears. No malocclusion. No stridor. Normal phonation. No drooling. Normal swallowing.   Neck: Symmetric, trachea midline. No JVD.  CV: RRR. No murmurs or gallops. Peripheral pulses +2 throughout. No chest wall tenderness.   Lungs: Unlabored. No retractions. CTA, lungs sounds equal bilateral. No tachypnea.   Abd: +BS, soft, NT/ND.   MSK: FROM, no deformity/injury.  Back: No midline tenderness.  Skin: Dry, intact.   Neuro: AAOx3, GCS 15, CN II-XII grossly  intact. Motor grossly intact.  Psychiatric/Behavioral: Appropriate mood and affect   1217 ASSESSMENT: Patient is a 53 y.o. female who presents with chest pain.   DDX includes but not limited to: ACS, musculoskeletal chest wall pain, doubt pneumonia, doubt pneumothorax.   PLAN: CBC, CMP, Trop, CXR, EKG.   1300 hs TnI 0hr: 4   1305 WBC: 5.79   1305 Hemoglobin(!): 11.4   1305 Platelet Count: 195   1305 Sodium: 141   1305 Potassium: 4.1   1305 BUN: 12   1305 Creatinine: 0.79   1305 Glucose, Random: 96   1305 hs TnI 0hr: 4  Doubt ACS   1410 Case discussed with cardiology who was made aware of patient, will evaluate and give recommendations.   1420 Discussed results and plan with patient and  at bedside. Advised on need for admission at this time. All questions answered. Patient expressed verbal understanding and is agreeable with plan for admission. Case discussed with SOD who will admit patient for chest pain rule out with abnormal stress test.             HEART Risk Score      Flowsheet Row Most Recent Value   Heart Score Risk Calculator    History 1 Filed at: 01/06/2024 1423   ECG 1 Filed at: 01/06/2024 1423   Age 1 Filed at: 01/06/2024 1423   Risk Factors 1 Filed at: 01/06/2024 1423   Troponin 0 Filed at: 01/06/2024 1423   HEART Score 4 Filed at: 01/06/2024 1423                        SBIRT 22yo+      Flowsheet Row Most Recent Value   Initial Alcohol Screen: US AUDIT-C     1. How often do you have a drink containing alcohol? 0 Filed at: 01/06/2024 1154   2. How many drinks containing alcohol do you have on a typical day you are drinking?  0 Filed at: 01/06/2024 1154   3a. Male UNDER 65: How often do you have five or more drinks on one occasion? 0 Filed at: 01/06/2024 1154   3b. FEMALE Any Age, or MALE 65+: How often do you have 4 or more drinks on one occassion? 0 Filed at: 01/06/2024 1154   Audit-C Score 0 Filed at: 01/06/2024 1154   JOSH: How many times in the past year have you...    Used an illegal drug or  used a prescription medication for non-medical reasons? Never Filed at: 01/06/2024 1154                  Medical Decision Making  Problems Addressed:  Abnormal cardiovascular stress test: acute illness or injury  Chest pain: acute illness or injury  Dyspnea on exertion: acute illness or injury    Amount and/or Complexity of Data Reviewed  Independent Historian: spouse  Labs: ordered. Decision-making details documented in ED Course.  Radiology: ordered.    Risk  OTC drugs.  Decision regarding hospitalization.        See ED course above for additional details including HPI, MDM including PLAN, and disposition.    Disposition  Final diagnoses:   Chest pain   Abnormal cardiovascular stress test   Dyspnea on exertion     Time reflects when diagnosis was documented in both MDM as applicable and the Disposition within this note       Time User Action Codes Description Comment    1/6/2024  2:23 PM Ruthy Herron [R07.9] Chest pain     1/6/2024  2:24 PM Ruthy Herron [R94.39] Abnormal cardiovascular stress test     1/6/2024  2:24 PM Ruthy Herron [R06.09] Dyspnea on exertion           ED Disposition       ED Disposition   Admit    Condition   Stable    Date/Time   Sat Jan 6, 2024 1423    Comment   Case was discussed with SOD and the patient's admission status was agreed to be Admission Status: inpatient status to the service of Dr. Irizarry.               Follow-up Information    None         Patient's Medications   Discharge Prescriptions    No medications on file     No discharge procedures on file.    PDMP Review       None             ED Provider  Attending physically available and evaluated Floridalma Davison. I managed the patient along with the ED Attending.    Electronically Signed by           Ruthy Herron DO  01/06/24 3622

## 2024-01-06 NOTE — ASSESSMENT & PLAN NOTE
Minimal past medical history apart from polyarthralgia seen by PCP.  -CRP, ESR, TOM, rheumatologic workup was ordered outpatient but this was never done  -Can do further workup outpatient

## 2024-01-06 NOTE — ASSESSMENT & PLAN NOTE
Patient presenting with chest pain since 7 AM.  Patient states it is a pressure sensation on her left/center of her chest that is now resolved.  -Recently had a stress test done yesterday which was present at bedside due to being from an outside facility for shortness of breath over the last several months    -No official read on stress test but did note ST depressions and T wave inversions  On repeat ECG here, shows resolution of ST depressions    -Per cardiology, stress test likely false positive ideal test would be CTA coronaries but unable to be performed on site    Echo: EF 65% no wall motion abnormalities     Nuclear stress test:    Stress ECG: Horizontal ST depression in the inferior, inferolateral and anterior leads (II, III, aVF, V3, V4, V5 and V6) is noted. There were no arrhythmias during recovery. . The stress ECG is abnormal after maximal exercise, without reproduction of symptoms.    Perfusion Defect Conclusion: The stress/rest perfusion ratio is 1.10. There is no evidence of transient ischemic dilation (TID). Myocardial wall thickness appears to be increased, consistent with left ventricular hypertrophy.    Perfusion: There are no perfusion defects.    Stress Function: Left ventricular function post-stress is normal. Stress ejection fraction is 70%.    Stress Combined Conclusion: Left ventricular perfusion is normal.     Most likely normal exercise nuclear medicine study with normal perfusion. Notable ECG changes likely from LVH, with hypertensive response to exercise.        Per cardiology, d/c heparin gtt, aspirin 325, lipitor 40, lopressor 12.5 BID   CTA coronaries at outside facility pending nuclear stress test results   A1c, lipid panel, TSH wnl  Follow-up with cardiology outpatient, appt 1/11/24

## 2024-01-06 NOTE — ED NOTES
Pt given 81mg Aspirin chewable tablets x4, verbal via Dr. Fraser.      Apoorva Dodge, RN  01/06/24 4910

## 2024-01-06 NOTE — ED ATTENDING ATTESTATION
1/6/2024  I, Karen Lewis MD, saw and evaluated the patient. I have discussed the patient with the resident/non-physician practitioner and agree with the resident's/non-physician practitioner's findings, Plan of Care, and MDM as documented in the resident's/non-physician practitioner's note, except where noted. All available labs and Radiology studies were reviewed.  I was present for key portions of any procedure(s) performed by the resident/non-physician practitioner and I was immediately available to provide assistance.       At this point I agree with the current assessment done in the Emergency Department.  I have conducted an independent evaluation of this patient a history and physical is as follows:    53-year-old female who presents for evaluation of chest pain.  Patient states she has been having ongoing shortness of breath for the past few months.  She states shortness of breath occurs with exertion.  She states today, she developed chest pain in the left side of her chest starting at 7 AM.  Pain is actually improved when she exerts herself and worse when she is just laying still.  Has never had chest pain before.  Patient did have a stress test performed yesterday.  She was called by the doctor because her stress test was abnormal.  I personally reviewed the results of her stress test, patient has T wave inversions in the inferior and lateral leads at rest, she has worsening ST depressions with exertion.  Patient otherwise denies fevers or chills.  Denies nausea, vomiting, diarrhea, or abdominal pain.    Physical exam:  Vital signs reviewed, within normal limits.  Patient is awake and alert, no acute distress.  Head normocephalic, atraumatic, mucous membranes moist, heart regular rate and rhythm, no murmurs/rubs/gallops, lungs clear to auscultation bilaterally, abdomen soft, nontender, nondistended, no peripheral edema, 2+ bilateral PT and radial pulses, no focal neurologic  deficits.    Assessment/plan:  53-year-old female who presents for evaluation of chest pain.  Patient had a stress test yesterday showing dynamic ST depressions with exertion, she does have shortness of breath with exertion for the past few weeks.  Concern for ACS/unstable angina.  Will check CBC to evaluate for anemia, CMP to evaluate for metabolic abnormality, troponin to evaluate for ACS, EKG to evaluate for arrhythmia.  Will evaluate with chest x-ray to evaluate for pneumothorax, cardiomegaly, or other intrathoracic pathology. Will discuss stress test results with cardiology. Anticipate admission    ED Course         Critical Care Time  Procedures

## 2024-01-07 ENCOUNTER — APPOINTMENT (INPATIENT)
Dept: NON INVASIVE DIAGNOSTICS | Facility: HOSPITAL | Age: 54
DRG: 313 | End: 2024-01-07
Payer: COMMERCIAL

## 2024-01-07 LAB
ANION GAP SERPL CALCULATED.3IONS-SCNC: 5 MMOL/L
AORTIC ROOT: 2.6 CM
APICAL FOUR CHAMBER EJECTION FRACTION: 67 %
APTT PPP: 71 SECONDS (ref 23–37)
APTT PPP: 90 SECONDS (ref 23–37)
ATRIAL RATE: 61 BPM
ATRIAL RATE: 62 BPM
BASOPHILS # BLD AUTO: 0.05 THOUSANDS/ÂΜL (ref 0–0.1)
BASOPHILS NFR BLD AUTO: 1 % (ref 0–1)
BUN SERPL-MCNC: 11 MG/DL (ref 5–25)
CALCIUM SERPL-MCNC: 8.8 MG/DL (ref 8.4–10.2)
CHLORIDE SERPL-SCNC: 107 MMOL/L (ref 96–108)
CO2 SERPL-SCNC: 29 MMOL/L (ref 21–32)
CREAT SERPL-MCNC: 0.79 MG/DL (ref 0.6–1.3)
E WAVE DECELERATION TIME: 210 MS
E/A RATIO: 1.16
EOSINOPHIL # BLD AUTO: 0.28 THOUSAND/ÂΜL (ref 0–0.61)
EOSINOPHIL NFR BLD AUTO: 5 % (ref 0–6)
ERYTHROCYTE [DISTWIDTH] IN BLOOD BY AUTOMATED COUNT: 15.7 % (ref 11.6–15.1)
FRACTIONAL SHORTENING: 38 (ref 28–44)
GFR SERPL CREATININE-BSD FRML MDRD: 85 ML/MIN/1.73SQ M
GLUCOSE SERPL-MCNC: 107 MG/DL (ref 65–140)
HCT VFR BLD AUTO: 35.9 % (ref 34.8–46.1)
HGB BLD-MCNC: 11.5 G/DL (ref 11.5–15.4)
IMM GRANULOCYTES # BLD AUTO: 0.02 THOUSAND/UL (ref 0–0.2)
IMM GRANULOCYTES NFR BLD AUTO: 0 % (ref 0–2)
INR PPP: 1.09 (ref 0.84–1.19)
INTERVENTRICULAR SEPTUM IN DIASTOLE (PARASTERNAL SHORT AXIS VIEW): 0.8 CM
INTERVENTRICULAR SEPTUM: 0.8 CM (ref 0.6–1.1)
LAAS-AP2: 12.3 CM2
LAAS-AP4: 14.7 CM2
LEFT ATRIUM SIZE: 2.7 CM
LEFT ATRIUM VOLUME (MOD BIPLANE): 35 ML
LEFT ATRIUM VOLUME INDEX (MOD BIPLANE): 22 ML/M2
LEFT INTERNAL DIMENSION IN SYSTOLE: 2.6 CM (ref 2.1–4)
LEFT VENTRICULAR INTERNAL DIMENSION IN DIASTOLE: 4.2 CM (ref 3.5–6)
LEFT VENTRICULAR POSTERIOR WALL IN END DIASTOLE: 1.1 CM
LEFT VENTRICULAR STROKE VOLUME: 53 ML
LVSV (TEICH): 53 ML
LYMPHOCYTES # BLD AUTO: 1.37 THOUSANDS/ÂΜL (ref 0.6–4.47)
LYMPHOCYTES NFR BLD AUTO: 25 % (ref 14–44)
MCH RBC QN AUTO: 20.2 PG (ref 26.8–34.3)
MCHC RBC AUTO-ENTMCNC: 32 G/DL (ref 31.4–37.4)
MCV RBC AUTO: 63 FL (ref 82–98)
MONOCYTES # BLD AUTO: 0.41 THOUSAND/ÂΜL (ref 0.17–1.22)
MONOCYTES NFR BLD AUTO: 7 % (ref 4–12)
MV E'TISSUE VEL-SEP: 8 CM/S
MV PEAK A VEL: 0.62 M/S
MV PEAK E VEL: 72 CM/S
MV STENOSIS PRESSURE HALF TIME: 61 MS
MV VALVE AREA P 1/2 METHOD: 3.61
NEUTROPHILS # BLD AUTO: 3.41 THOUSANDS/ÂΜL (ref 1.85–7.62)
NEUTS SEG NFR BLD AUTO: 62 % (ref 43–75)
NRBC BLD AUTO-RTO: 0 /100 WBCS
P AXIS: 12 DEGREES
P AXIS: 32 DEGREES
PLATELET # BLD AUTO: 187 THOUSANDS/UL (ref 149–390)
POTASSIUM SERPL-SCNC: 3.6 MMOL/L (ref 3.5–5.3)
PR INTERVAL: 126 MS
PR INTERVAL: 132 MS
PROTHROMBIN TIME: 14 SECONDS (ref 11.6–14.5)
QRS AXIS: 66 DEGREES
QRS AXIS: 75 DEGREES
QRSD INTERVAL: 80 MS
QRSD INTERVAL: 82 MS
QT INTERVAL: 414 MS
QT INTERVAL: 436 MS
QTC INTERVAL: 420 MS
QTC INTERVAL: 438 MS
RA PRESSURE ESTIMATED: 3 MMHG
RBC # BLD AUTO: 5.7 MILLION/UL (ref 3.81–5.12)
RIGHT ATRIUM AREA SYSTOLE A4C: 12.7 CM2
RIGHT VENTRICLE ID DIMENSION: 3.1 CM
RV PSP: 25 MMHG
SL CV LEFT ATRIUM LENGTH A2C: 4.1 CM
SL CV LV EF: 65
SL CV PED ECHO LEFT VENTRICLE DIASTOLIC VOLUME (MOD BIPLANE) 2D: 79 ML
SL CV PED ECHO LEFT VENTRICLE SYSTOLIC VOLUME (MOD BIPLANE) 2D: 25 ML
SODIUM SERPL-SCNC: 141 MMOL/L (ref 135–147)
T WAVE AXIS: 18 DEGREES
T WAVE AXIS: 61 DEGREES
TR MAX PG: 22 MMHG
TR PEAK VELOCITY: 2.4 M/S
TRICUSPID ANNULAR PLANE SYSTOLIC EXCURSION: 1.9 CM
TRICUSPID VALVE PEAK REGURGITATION VELOCITY: 2.35 M/S
VENTRICULAR RATE: 61 BPM
VENTRICULAR RATE: 62 BPM
WBC # BLD AUTO: 5.54 THOUSAND/UL (ref 4.31–10.16)

## 2024-01-07 PROCEDURE — 36415 COLL VENOUS BLD VENIPUNCTURE: CPT

## 2024-01-07 PROCEDURE — 85730 THROMBOPLASTIN TIME PARTIAL: CPT

## 2024-01-07 PROCEDURE — 93306 TTE W/DOPPLER COMPLETE: CPT

## 2024-01-07 PROCEDURE — 99222 1ST HOSP IP/OBS MODERATE 55: CPT | Performed by: INTERNAL MEDICINE

## 2024-01-07 PROCEDURE — 85025 COMPLETE CBC W/AUTO DIFF WBC: CPT

## 2024-01-07 PROCEDURE — 80048 BASIC METABOLIC PNL TOTAL CA: CPT

## 2024-01-07 PROCEDURE — 85610 PROTHROMBIN TIME: CPT

## 2024-01-07 PROCEDURE — 99232 SBSQ HOSP IP/OBS MODERATE 35: CPT | Performed by: INTERNAL MEDICINE

## 2024-01-07 PROCEDURE — 93306 TTE W/DOPPLER COMPLETE: CPT | Performed by: INTERNAL MEDICINE

## 2024-01-07 PROCEDURE — 93005 ELECTROCARDIOGRAM TRACING: CPT

## 2024-01-07 RX ADMIN — ATORVASTATIN CALCIUM 40 MG: 40 TABLET, FILM COATED ORAL at 16:15

## 2024-01-07 RX ADMIN — Medication 12.5 MG: at 08:32

## 2024-01-07 NOTE — CONSULTS
Consultation - General Cardiology Team 2  Floridalma Davison 53 y.o. female MRN: [id number]  Unit/Bed#: [facility] 531-01 Encounter: [id number]              History of Present Illness   Physician Requesting Consult: Jarrett Irizarry MD  Reason for Consult / Principal Problem: Chest pain     HPI: Floridalma Davison is a 53 y.o. year old female who presented with chest pain.  She has a past medical history significant for thalassemia alpha carrier but no cardiac history.  She recently had a ECG stress test completed through a program at her 's work which noted T wave inversions and ST depressions in the inferior leads.  She did not have symptoms prior to getting the stress test completed.  She notes never having chest pain, shortness of breath, palpitations, lightheadedness, dizziness, syncope.  She notes she has had increasing stress in her life recently more so than usual.  She does not note any limitations to her activities, chest pain with activity or worsening shortness of breath.  She is not very physically active but did complete a 10 mile run last year without issue.  She had brief period of smoking many years ago for a few years however has not smoked since.  She has a family history of hypertension multiple family members and heart failure in her maternal grandmother.     This morning she woke up and was going through her normal morning routine when she started to feel chest discomfort left-sided chest.  It did not radiate she did not have shortness of breath, palpitations, diaphoresis.  Nothing made it better or worse and is not positional.  This chest pain along with her abnormal stress test caused her to come to the ER for evaluation.  She has had negative troponin so far with normal ECG.  She was initiated on heparin, aspirin cardiology was consulted.        Review of Systems  Review of system was conducted and was negative except for as stated in the HPI.              Historical Information   Medical History         Past Medical History:   Diagnosis Date    Abnormal Pap smear of cervix       6 yrs ago    Female infertility      Miscarriage       , 2016    Recurrent pregnancy loss, antepartum condition or complication       x 2 2015,-D & C; 2016-chemical pregnancy    Thalassemia       carrier    Urinary tract infection       last episode 2019    Varicella       childhood chickenpox         Surgical History         Past Surgical History:   Procedure Laterality Date    DILATION AND CURETTAGE OF UTERUS   2015    EYE SURGERY        [place]  DELIVERY ONLY N/A 2020     Procedure:  SECTION ();  Surgeon: Juliana Cuenca MD;  Location: [facility] ;  Service: Obstetrics    TOOTH EXTRACTION   2022     Molar removed    WISDOM TOOTH EXTRACTION             Social History           Substance and Sexual Activity   Alcohol Use Yes    Alcohol/week: 2.0 standard drinks of alcohol    Types: 2 Glasses of wine per week     Comment: occ      Social History          Substance and Sexual Activity   Drug Use No      Social History          Tobacco Use   Smoking Status Never   Smokeless Tobacco Never      Family History: non-contributory           Meds/Allergies   Hospital Medications:   Current Medications          Current Facility-Administered Medications   Medication Dose Route Frequency    acetaminophen (TYLENOL) tablet 975 mg  975 mg Oral Q8H PRN    atorvastatin (LIPITOR) tablet 40 mg  40 mg Oral Daily With Dinner    metoprolol tartrate (LOPRESSOR) partial tablet 12.5 mg  12.5 mg Oral Q12H KARSON    nitroglycerin (NITROSTAT) SL tablet 0.4 mg  0.4 mg Sublingual Q5 Min PRN    oxyCODONE (ROXICODONE) split tablet 2.5 mg  2.5 mg Oral Q6H PRN         Home Medications:   Prescriptions Prior to Admission       Medications Prior to Admission   Medication    methocarbamol (ROBAXIN) 500 mg tablet                 Allergies   Allergen Reactions    Dicloxacillin Rash               Objective   Vitals: Blood pressure 126/76,  "pulse 62, temperature 98 °F (36.7 °C), temperature source Oral, resp. rate 15, height 5' 2\" (1.575 m), weight 58.5 kg (129 lb), last menstrual period 11/05/2022, SpO2 94%, currently breastfeeding.  Orthostatic Blood Pressures       Flowsheet Row Most Recent Value   Blood Pressure 126/76 filed at 01/07/2024 1513   Patient Position - Orthostatic VS Lying filed at 01/07/2024 1513                   Invasive Devices         Peripheral Intravenous Line  Duration                Peripheral IV 01/06/24 Left Antecubital 1 day                          Physical Exam  GEN: Floridalma Davison appears well, alert and oriented x 3, pleasant and cooperative   HEENT:  Normocephalic, atraumatic, anicteric, moist mucous membranes  NECK: No JVD or carotid bruits   HEART: Normal sinus rhythm, regular rate, normal S1 and S2, no murmurs, clicks, gallops or rubs   LUNGS: Clear to auscultation bilaterally; no wheezes, rales, or rhonchi; respiration nonlabored   ABDOMEN:  Normoactive bowel sounds, soft, no tenderness, no distention  EXTREMITIES: peripheral pulses palpable; no edema  NEURO: no gross focal findings; cranial nerves grossly intact   SKIN:  Dry, intact, warm to touch     Lab Results: I have personally reviewed pertinent lab results.           Results from last 7 days   Lab Units 01/06/24  1653 01/06/24  1431 01/06/24  1220   HS TNI 0HR ng/L  --   --  4   HS TNI 2HR ng/L  --  4  --    HS TNI 4HR ng/L 4  --   --                 Results from last 7 days   Lab Units 01/07/24  1013 01/06/24  1220   POTASSIUM mmol/L 3.6 4.1   CO2 mmol/L 29 26   CHLORIDE mmol/L 107 107   BUN mg/dL 11 12   CREATININE mg/dL 0.79 0.79            Results from last 7 days   Lab Units 01/07/24  1013 01/06/24  1248   HEMOGLOBIN g/dL 11.5 11.4*   HEMATOCRIT % 35.9 35.9   PLATELETS Thousands/uL 187 195           Results from last 7 days   Lab Units 01/06/24  1509   TRIGLYCERIDES mg/dL 62   HDL mg/dL 37*   LDL CALC mg/dL 75   HEMOGLOBIN A1C % 5.1            Imaging: " I have personally reviewed pertinent reports.          Telemetry:   Normal sinus rhythm     EKG:   Date: 1/7/2024  Interpretation: Sinus rhythm, normal ECG     ECHO:  No results found for this or any previous visit.     Results for orders placed during the hospital encounter of 01/06/24     Echo complete w/ contrast if indicated     Interpretation Summary    Left Ventricle: Left ventricular cavity size is normal. Wall thickness is normal. The left ventricular ejection fraction is 65%. Systolic function is normal. Wall motion is normal. Diastolic function is normal.    Right Ventricle: Right ventricular cavity size is normal. Systolic function is normal.    No significant valvular abnormalities.     No prior studies for comparison.              Assessment/Plan   Chest pain  Abnormal stress test  Thalassemia alpha carrier  Polyarthralgia     Patient presenting with chest pain starting yesterday morning prior to arrival.  Patient is sedated on ACS protocol with heparin drip and aspirin load.  Troponins have been negative and EKG without ST changes.  Stress test EKGs reviewed, there are T wave inversions and ST depressions that developed during exercise stress test which improved within 1 minute of rest.  She denied chest pain during the stress test.  There is a high likelihood that this may be false positive stress test given her lack of symptoms, low risk factors and age.  Will benefit from further noninvasive workup with either CTA or nuclear stress.  CTA not currently available at [place] and therefore will undergo nuclear stress.  -Echo reviewed, LVEF 65% no wall motion abnormalities.     Plan:  - Will proceed with nuclear myocardial perfusion scan, would prefer CTA to receive calcium score as well however not available currently  - No need for ACS protocol chest pain-free and no troponin elevation  - She does not meet criteria for statin at this time.  Can further stratify with coronary calcium score as an  outpatient  -Will discontinue Lopressor 12.5 mg twice daily for now given she is not having chest pain and troponins were negative.  If she develops chest pain again and try nitro and then at that time consider beta-blocker for antianginal.        Case discussed and reviewed with Dr. Balderrama who agrees with my assessment and plan.     Thank you for involving us in the care of your patient.        Dario Elaine, DO  Cardiology Fellow   PGY-4     ==========================================================================================     Epic/ Allscripts/Care Everywhere records reviewed     ** Please Note: Fluency DirectDictation voice to text software may have been used in the creation of this document. **

## 2024-01-07 NOTE — PROGRESS NOTES
INTERNAL MEDICINE RESIDENCY PROGRESS NOTE     Name: Floridalma Davison   Age & Sex: 53 y.o. female   MRN: [id number]  Unit/Bed#: [facility] 531-01   Encounter: [id number]  Team: SOD Team C     PATIENT INFORMATION     Name: Floridalma Davison   Age & Sex: 53 y.o. female   MRN: [id number]  Hospital Stay Days: 1    ASSESSMENT/PLAN     Active Problems:    Thalassemia alpha carrier    Polyarthralgia    Chest pain      Chest pain  Assessment & Plan  Patient presenting with chest pain since 7 AM.  Patient states it is a pressure sensation on her left/center of her chest that has not gone away.  -Recently had a stress test done yesterday which was present at bedside due to being from an outside facility for shortness of breath over the last several months    -No official read on stress test but did note ST depressions and T wave inversions  On repeat ECG here, shows resolution of ST depressions    Will start patient on heparin gtt, aspirin 325, lipitor 40, lopressor 12.5 BID  Telemetry  Cardiology consulted  Echo ordered  A1c, lipid panel, TSH wnl    Polyarthralgia  Assessment & Plan  Minimal past medical history apart from polyarthralgia seen by PCP.  -CRP, ESR, TOM, rheumatologic workup was ordered outpatient but this was never done  -Can do further workup outpatient    Thalassemia alpha carrier  Assessment & Plan  Patient is known to be a thalassemia alpha carrier with baseline anemia of 10-11        Disposition: Waiting cardiology rec pending ECHO read     SUBJECTIVE     Patient seen and examined. No acute events overnight.  Patient denies any chest pain.  Endorsing no complaints.  Resting comfortably.  Denies SOB or lightheadedness.  According to the patient, 7 AM on the day of presentation patient was experiencing 2 out of 10 chest pressure.  No prior history of of similar events.  Does not smoke tobacco or use any illicit drug products.  Does not take any medication at home.  No other known medical problems.  Per patient,  "patient received an elective stress test which showed T wave inversions at baseline and then also with ST downsloping depression during the stress test that persisted with rest.  From this abnormal results, patient was instructed to follow-up with her cardiologist.  Repeat ECG yesterday revealed nonspecific T wave abnormalities and no ST depressions.  Patient has since been on ACS protocol with heparin drip and aspirin 325.  Awaiting cardiology recommendation.    OBJECTIVE     Vitals:    24 0700 24 0726 24 0726 24 0845   BP: 108/55  131/81 131/81   BP Location:       Pulse: 56  61 61   Resp: 13      Temp:   97.5 °F (36.4 °C)    TempSrc:       SpO2: 96% 96% 96%    Weight: 58.9 kg (129 lb 13.6 oz)   58.5 kg (129 lb)   Height: 5' 2\" (1.575 m)   5' 2\" (1.575 m)      Temperature:   Temp (24hrs), Av.5 °F (36.4 °C), Min:97.5 °F (36.4 °C), Max:97.5 °F (36.4 °C)    Temperature: 97.5 °F (36.4 °C)  Intake & Output:  I/O          07    P.O.   240    I.V. (mL/kg)   118.1 (2)    Total Intake(mL/kg)   358.1 (6.1)    Net   +358.1           Unmeasured Urine Occurrence   1 x          Weights:   IBW (Ideal Body Weight): 50.1 kg    Body mass index is 23.59 kg/m².  Weight (last 2 days)       Date/Time Weight    24 0845 58.5 (129)    24 0700 58.9 (129.85)    24 2200 59 (130)          Physical Exam  Constitutional:       General: She is not in acute distress.     Appearance: Normal appearance. She is normal weight. She is not ill-appearing, toxic-appearing or diaphoretic.   HENT:      Mouth/Throat:      Mouth: Mucous membranes are moist.      Pharynx: Oropharynx is clear.   Eyes:      General: No scleral icterus.  Cardiovascular:      Rate and Rhythm: Normal rate and regular rhythm.      Pulses: Normal pulses.      Heart sounds: Normal heart sounds. No murmur heard.  Pulmonary:      Effort: Pulmonary effort is normal.      Breath " sounds: Normal breath sounds.   Abdominal:      General: Abdomen is flat. Bowel sounds are normal.      Tenderness: There is no abdominal tenderness.   Musculoskeletal:         General: No swelling. Normal range of motion.   Skin:     General: Skin is warm.      Capillary Refill: Capillary refill takes less than 2 seconds.   Neurological:      General: No focal deficit present.      Mental Status: She is alert and oriented to person, place, and time.   Psychiatric:         Mood and Affect: Mood normal.         Behavior: Behavior normal.       LABORATORY DATA     Labs: I have personally reviewed pertinent reports.  Results from last 7 days   Lab Units 01/07/24  1013 01/06/24  1248   WBC Thousand/uL 5.54 5.79   HEMOGLOBIN g/dL 11.5 11.4*   HEMATOCRIT % 35.9 35.9   PLATELETS Thousands/uL 187 195   NEUTROS PCT % 62 62   MONOS PCT % 7 6   EOS PCT % 5 4      Results from last 7 days   Lab Units 01/07/24  1013 01/06/24  1220   POTASSIUM mmol/L 3.6 4.1   CHLORIDE mmol/L 107 107   CO2 mmol/L 29 26   BUN mg/dL 11 12   CREATININE mg/dL 0.79 0.79   CALCIUM mg/dL 8.8 9.1   ALK PHOS U/L  --  55   ALT U/L  --  14   AST U/L  --  18              Results from last 7 days   Lab Units 01/07/24  0931 01/07/24  0547 01/06/24  2150 01/06/24  1431   INR   --  1.09  --  1.07   PTT seconds 71* 90* 58* 27               IMAGING & DIAGNOSTIC TESTING     Radiology Results: I have personally reviewed pertinent reports.  XR chest 2 views    Result Date: 1/6/2024  Impression: No acute cardiopulmonary disease. Workstation performed: [id number]     Other Diagnostic Testing: I have personally reviewed pertinent reports.    ACTIVE MEDICATIONS     Current Facility-Administered Medications   Medication Dose Route Frequency    acetaminophen (TYLENOL) tablet 975 mg  975 mg Oral Q8H PRN    atorvastatin (LIPITOR) tablet 40 mg  40 mg Oral Daily With Dinner    heparin (porcine) 25,000 units in 0.45% NaCl 250 mL infusion (premix)  3-20 Units/kg/hr  "(Order-Specific) Intravenous Titrated    heparin (porcine) injection 1,650 Units  1,650 Units Intravenous Q6H PRN    heparin (porcine) injection 3,300 Units  3,300 Units Intravenous Q6H PRN    metoprolol tartrate (LOPRESSOR) partial tablet 12.5 mg  12.5 mg Oral Q12H KARSON    nitroglycerin (NITROSTAT) SL tablet 0.4 mg  0.4 mg Sublingual Q5 Min PRN    oxyCODONE (ROXICODONE) split tablet 2.5 mg  2.5 mg Oral Q6H PRN       VTE Pharmacologic Prophylaxis: Heparin  VTE Mechanical Prophylaxis: reason for no mechanical VTE prophylaxis hep gtt    Portions of the record may have been created with voice recognition software.  Occasional wrong word or \"sound a like\" substitutions may have occurred due to the inherent limitations of voice recognition software.  Read the chart carefully and recognize, using context, where substitutions have occurred.  ==  Jacob Villatoro MD  [facility]  Internal Medicine Residency PGY-1      "

## 2024-01-07 NOTE — PLAN OF CARE
Problem: Potential for Falls  Goal: Patient will remain free of falls  Description: INTERVENTIONS:  - Educate patient/family on patient safety including physical limitations  - Instruct patient to call for assistance with activity   - Consult OT/PT to assist with strengthening/mobility   - Keep Call bell within reach  - Keep bed low and locked with side rails adjusted as appropriate  - Keep care items and personal belongings within reach  - Initiate and maintain comfort rounds  - Make Fall Risk Sign visible to staff  - Offer Toileting every  Hours, in advance of need  - Initiate/Maintain alarm  - Obtain necessary fall risk management equipment:   - Apply yellow socks and bracelet for high fall risk patients  - Consider moving patient to room near nurses station  Outcome: Progressing     Problem: PAIN - ADULT  Goal: Verbalizes/displays adequate comfort level or baseline comfort level  Description: Interventions:  - Encourage patient to monitor pain and request assistance  - Assess pain using appropriate pain scale  - Administer analgesics based on type and severity of pain and evaluate response  - Implement non-pharmacological measures as appropriate and evaluate response  - Consider cultural and social influences on pain and pain management  - Notify physician/advanced practitioner if interventions unsuccessful or patient reports new pain  Outcome: Progressing     Problem: INFECTION - ADULT  Goal: Absence or prevention of progression during hospitalization  Description: INTERVENTIONS:  - Assess and monitor for signs and symptoms of infection  - Monitor lab/diagnostic results  - Monitor all insertion sites, i.e. indwelling lines, tubes, and drains  - Monitor endotracheal if appropriate and nasal secretions for changes in amount and color  - [place] appropriate cooling/warming therapies per order  - Administer medications as ordered  - Instruct and encourage patient and family to use good hand hygiene technique  -  Identify and instruct in appropriate isolation precautions for identified infection/condition  Outcome: Progressing  Goal: Absence of fever/infection during neutropenic period  Description: INTERVENTIONS:  - Monitor WBC    Outcome: Progressing     Problem: SAFETY ADULT  Goal: Patient will remain free of falls  Description: INTERVENTIONS:  - Educate patient/family on patient safety including physical limitations  - Instruct patient to call for assistance with activity   - Consult OT/PT to assist with strengthening/mobility   - Keep Call bell within reach  - Keep bed low and locked with side rails adjusted as appropriate  - Keep care items and personal belongings within reach  - Initiate and maintain comfort rounds  - Make Fall Risk Sign visible to staff  - Offer Toileting every  Hours, in advance of need  - Initiate/Maintain alarm  - Obtain necessary fall risk management equipment:   - Apply yellow socks and bracelet for high fall risk patients  - Consider moving patient to room near nurses station  Outcome: Progressing  Goal: Maintain or return to baseline ADL function  Description: INTERVENTIONS:  -  Assess patient's ability to carry out ADLs; assess patient's baseline for ADL function and identify physical deficits which impact ability to perform ADLs (bathing, care of mouth/teeth, toileting, grooming, dressing, etc.)  - Assess/evaluate cause of self-care deficits   - Assess range of motion  - Assess patient's mobility; develop plan if impaired  - Assess patient's need for assistive devices and provide as appropriate  - Encourage maximum independence but intervene and supervise when necessary  - Involve family in performance of ADLs  - Assess for home care needs following discharge   - Consider OT consult to assist with ADL evaluation and planning for discharge  - Provide patient education as appropriate  Outcome: Progressing  Goal: Maintains/Returns to pre admission functional level  Description:  INTERVENTIONS:  - Perform AM-PAC 6 Click Basic Mobility/ Daily Activity assessment daily.  - Set and communicate daily mobility goal to care team and patient/family/caregiver.   - Collaborate with rehabilitation services on mobility goals if consulted  - Perform Range of Motion  times a day.  - Reposition patient every  hours.  - Dangle patient  times a day  - Stand patient  times a day  - Ambulate patient  times a day  - Out of bed to chair  times a day   - Out of bed for meals times a day  - Out of bed for toileting  - Record patient progress and toleration of activity level   Outcome: Progressing     Problem: DISCHARGE PLANNING  Goal: Discharge to home or other facility with appropriate resources  Description: INTERVENTIONS:  - Identify barriers to discharge w/patient and caregiver  - Arrange for needed discharge resources and transportation as appropriate  - Identify discharge learning needs (meds, wound care, etc.)  - Arrange for interpretive services to assist at discharge as needed  - Refer to Case Management Department for coordinating discharge planning if the patient needs post-hospital services based on physician/advanced practitioner order or complex needs related to functional status, cognitive ability, or social support system  Outcome: Progressing     Problem: Knowledge Deficit  Goal: Patient/family/caregiver demonstrates understanding of disease process, treatment plan, medications, and discharge instructions  Description: Complete learning assessment and assess knowledge base.  Interventions:  - Provide teaching at level of understanding  - Provide teaching via preferred learning methods  Outcome: Progressing     Problem: CARDIOVASCULAR - ADULT  Goal: Maintains optimal cardiac output and hemodynamic stability  Description: INTERVENTIONS:  - Monitor I/O, vital signs and rhythm  - Monitor for S/S and trends of decreased cardiac output  - Administer and titrate ordered vasoactive medications to  optimize hemodynamic stability  - Assess quality of pulses, skin color and temperature  - Assess for signs of decreased coronary artery perfusion  - Instruct patient to report change in severity of symptoms  Outcome: Progressing  Goal: Absence of cardiac dysrhythmias or at baseline rhythm  Description: INTERVENTIONS:  - Continuous cardiac monitoring, vital signs, obtain 12 lead EKG if ordered  - Administer antiarrhythmic and heart rate control medications as ordered  - Monitor electrolytes and administer replacement therapy as ordered  Outcome: Progressing

## 2024-01-07 NOTE — PROGRESS NOTES
Consultation - General Cardiology Team 2  Floridalma aDvison 53 y.o. female MRN: [id number]  Unit/Bed#: [facility] 531-01 Encounter: [id number]        History of Present Illness   Physician Requesting Consult: Jarrett Irizarry MD  Reason for Consult / Principal Problem: Chest pain    HPI: Floridalma Davison is a 53 y.o. year old female who presented with chest pain.  She has a past medical history significant for thalassemia alpha carrier but no cardiac history.  She recently had a ECG stress test completed through a program at her 's work which noted T wave inversions and ST depressions in the inferior leads.  She did not have symptoms prior to getting the stress test completed.  She notes never having chest pain, shortness of breath, palpitations, lightheadedness, dizziness, syncope.  She notes she has had increasing stress in her life recently more so than usual.  She does not note any limitations to her activities, chest pain with activity or worsening shortness of breath.  She is not very physically active but did complete a 10 mile run last year without issue.  She had brief period of smoking many years ago for a few years however has not smoked since.  She has a family history of hypertension multiple family members and heart failure in her maternal grandmother.    This morning she woke up and was going through her normal morning routine when she started to feel chest discomfort left-sided chest.  It did not radiate she did not have shortness of breath, palpitations, diaphoresis.  Nothing made it better or worse and is not positional.  This chest pain along with her abnormal stress test caused her to come to the ER for evaluation.  She has had negative troponin so far with normal ECG.  She was initiated on heparin, aspirin cardiology was consulted.      Review of Systems  Review of system was conducted and was negative except for as stated in the HPI.      Historical Information   Past Medical History:   Diagnosis  "Date    Abnormal Pap smear of cervix     6 yrs ago    Female infertility     Miscarriage     , 2016    Recurrent pregnancy loss, antepartum condition or complication     x 2 2015,-D & C; 2016-chemical pregnancy    Thalassemia     carrier    Urinary tract infection     last episode 2019    Varicella     childhood chickenpox     Past Surgical History:   Procedure Laterality Date    DILATION AND CURETTAGE OF UTERUS  2015    [place] SURGERY      [place]  DELIVERY ONLY N/A 2020    Procedure:  SECTION ();  Surgeon: Juliana Cuenca MD;  Location: [facility] ;  Service: Obstetrics    TOOTH EXTRACTION  2022    Molar removed    WISDOM TOOTH EXTRACTION       Social History     Substance and Sexual Activity   Alcohol Use Yes    Alcohol/week: 2.0 standard drinks of alcohol    Types: 2 Glasses of wine per week    Comment: occ     Social History     Substance and Sexual Activity   Drug Use No     Social History     Tobacco Use   Smoking Status Never   Smokeless Tobacco Never     Family History: non-contributory    Meds/Allergies   Hospital Medications:   Current Facility-Administered Medications   Medication Dose Route Frequency    acetaminophen (TYLENOL) tablet 975 mg  975 mg Oral Q8H PRN    atorvastatin (LIPITOR) tablet 40 mg  40 mg Oral Daily With Dinner    metoprolol tartrate (LOPRESSOR) partial tablet 12.5 mg  12.5 mg Oral Q12H KARSON    nitroglycerin (NITROSTAT) SL tablet 0.4 mg  0.4 mg Sublingual Q5 Min PRN    oxyCODONE (ROXICODONE) split tablet 2.5 mg  2.5 mg Oral Q6H PRN     Home Medications:   Medications Prior to Admission   Medication    methocarbamol (ROBAXIN) 500 mg tablet       Allergies   Allergen Reactions    Dicloxacillin Rash       Objective   Vitals: Blood pressure 126/76, pulse 62, temperature 98 °F (36.7 °C), temperature source Oral, resp. rate 15, height 5' 2\" (1.575 m), weight 58.5 kg (129 lb), last menstrual period 2022, SpO2 94%, currently breastfeeding.  Orthostatic Blood " Pressures      Flowsheet Row Most Recent Value   Blood Pressure 126/76 filed at 01/07/2024 1513   Patient Position - Orthostatic VS Lying filed at 01/07/2024 1513              Invasive Devices       Peripheral Intravenous Line  Duration             Peripheral IV 01/06/24 Left Antecubital 1 day                    Physical Exam  GEN: Floridalma Davison appears well, alert and oriented x 3, pleasant and cooperative   HEENT:  Normocephalic, atraumatic, anicteric, moist mucous membranes  NECK: No JVD or carotid bruits   HEART: Normal sinus rhythm, regular rate, normal S1 and S2, no murmurs, clicks, gallops or rubs   LUNGS: Clear to auscultation bilaterally; no wheezes, rales, or rhonchi; respiration nonlabored   ABDOMEN:  Normoactive bowel sounds, soft, no tenderness, no distention  EXTREMITIES: peripheral pulses palpable; no edema  NEURO: no gross focal findings; cranial nerves grossly intact   SKIN:  Dry, intact, warm to touch    Lab Results: I have personally reviewed pertinent lab results.    Results from last 7 days   Lab Units 01/06/24  1653 01/06/24  1431 01/06/24  1220   HS TNI 0HR ng/L  --   --  4   HS TNI 2HR ng/L  --  4  --    HS TNI 4HR ng/L 4  --   --          Results from last 7 days   Lab Units 01/07/24  1013 01/06/24  1220   POTASSIUM mmol/L 3.6 4.1   CO2 mmol/L 29 26   CHLORIDE mmol/L 107 107   BUN mg/dL 11 12   CREATININE mg/dL 0.79 0.79     Results from last 7 days   Lab Units 01/07/24  1013 01/06/24  1248   HEMOGLOBIN g/dL 11.5 11.4*   HEMATOCRIT % 35.9 35.9   PLATELETS Thousands/uL 187 195     Results from last 7 days   Lab Units 01/06/24  1509   TRIGLYCERIDES mg/dL 62   HDL mg/dL 37*   LDL CALC mg/dL 75   HEMOGLOBIN A1C % 5.1         Imaging: I have personally reviewed pertinent reports.        Telemetry:   Normal sinus rhythm    EKG:   Date: 1/7/2024  Interpretation: Sinus rhythm, normal ECG    ECHO:  No results found for this or any previous visit.    Results for orders placed during the hospital  encounter of 01/06/24    Echo complete w/ contrast if indicated    Interpretation Summary    Left Ventricle: Left ventricular cavity size is normal. Wall thickness is normal. The left ventricular ejection fraction is 65%. Systolic function is normal. Wall motion is normal. Diastolic function is normal.    Right Ventricle: Right ventricular cavity size is normal. Systolic function is normal.    No significant valvular abnormalities.    No prior studies for comparison.      Assessment/Plan     Chest pain  Abnormal stress test  Thalassemia alpha carrier  Polyarthralgia    Patient presenting with chest pain starting yesterday morning prior to arrival.  Patient is sedated on ACS protocol with heparin drip and aspirin load.  Troponins have been negative and EKG without ST changes.  Stress test EKGs reviewed, there are T wave inversions and ST depressions that developed during exercise stress test which improved within 1 minute of rest.  She denied chest pain during the stress test.  There is a high likelihood that this may be false positive stress test given her lack of symptoms, low risk factors and age.  Will benefit from further noninvasive workup with either CTA or nuclear stress.  CTA not currently available at [place] and therefore will undergo nuclear stress.  -Echo reviewed, LVEF 65% no wall motion abnormalities.    Plan:  - Will proceed with nuclear myocardial perfusion scan, would prefer CTA to receive calcium score as well however not available currently  - No need for ACS protocol chest pain-free and no troponin elevation  - She does not meet criteria for statin at this time.  Can further stratify with coronary calcium score as an outpatient  -Will discontinue Lopressor 12.5 mg twice daily for now given she is not having chest pain and troponins were negative.  If she develops chest pain again and try nitro and then at that time consider beta-blocker for antianginal.      Case discussed and reviewed with   Tacos who agrees with my assessment and plan.    Thank you for involving us in the care of your patient.      Dario Elaine, DO  Cardiology Fellow   PGY-4    ==========================================================================================    Epic/ Allscripts/Care Everywhere records reviewed    ** Please Note: Fluency DirectDictation voice to text software may have been used in the creation of this document. **

## 2024-01-07 NOTE — NURSING NOTE
Spoke with cardiologist, Anthony Balderrama, regarding patient's heparin infusion. Verified it is okay to discontinue medication at this time. Verbal order placed and medication stopped.

## 2024-01-08 ENCOUNTER — APPOINTMENT (INPATIENT)
Dept: RADIOLOGY | Facility: HOSPITAL | Age: 54
DRG: 313 | End: 2024-01-08
Payer: COMMERCIAL

## 2024-01-08 ENCOUNTER — APPOINTMENT (INPATIENT)
Dept: NON INVASIVE DIAGNOSTICS | Facility: HOSPITAL | Age: 54
DRG: 313 | End: 2024-01-08
Payer: COMMERCIAL

## 2024-01-08 VITALS
SYSTOLIC BLOOD PRESSURE: 128 MMHG | WEIGHT: 129 LBS | RESPIRATION RATE: 18 BRPM | OXYGEN SATURATION: 96 % | HEIGHT: 61 IN | TEMPERATURE: 98 F | HEART RATE: 68 BPM | BODY MASS INDEX: 24.35 KG/M2 | DIASTOLIC BLOOD PRESSURE: 84 MMHG

## 2024-01-08 PROBLEM — R07.9 CHEST PAIN: Status: RESOLVED | Noted: 2024-01-06 | Resolved: 2024-01-08

## 2024-01-08 LAB
ANION GAP SERPL CALCULATED.3IONS-SCNC: 6 MMOL/L
BASOPHILS # BLD AUTO: 0.04 THOUSANDS/ÂΜL (ref 0–0.1)
BASOPHILS NFR BLD AUTO: 1 % (ref 0–1)
BUN SERPL-MCNC: 13 MG/DL (ref 5–25)
CALCIUM SERPL-MCNC: 8.6 MG/DL (ref 8.4–10.2)
CHEST PAIN STATEMENT: NORMAL
CHLORIDE SERPL-SCNC: 106 MMOL/L (ref 96–108)
CO2 SERPL-SCNC: 27 MMOL/L (ref 21–32)
CREAT SERPL-MCNC: 0.75 MG/DL (ref 0.6–1.3)
EOSINOPHIL # BLD AUTO: 0.28 THOUSAND/ÂΜL (ref 0–0.61)
EOSINOPHIL NFR BLD AUTO: 5 % (ref 0–6)
ERYTHROCYTE [DISTWIDTH] IN BLOOD BY AUTOMATED COUNT: 15.7 % (ref 11.6–15.1)
GFR SERPL CREATININE-BSD FRML MDRD: 90 ML/MIN/1.73SQ M
GLUCOSE SERPL-MCNC: 89 MG/DL (ref 65–140)
HCT VFR BLD AUTO: 35.5 % (ref 34.8–46.1)
HGB BLD-MCNC: 11.1 G/DL (ref 11.5–15.4)
IMM GRANULOCYTES # BLD AUTO: 0.02 THOUSAND/UL (ref 0–0.2)
IMM GRANULOCYTES NFR BLD AUTO: 0 % (ref 0–2)
LYMPHOCYTES # BLD AUTO: 1.7 THOUSANDS/ÂΜL (ref 0.6–4.47)
LYMPHOCYTES NFR BLD AUTO: 29 % (ref 14–44)
MAX DIASTOLIC BP: 78 MMHG
MAX HR PERCENT: 92 %
MAX HR: 153 BPM
MAX PREDICTED HEART RATE: 166 BPM
MCH RBC QN AUTO: 20 PG (ref 26.8–34.3)
MCHC RBC AUTO-ENTMCNC: 31.3 G/DL (ref 31.4–37.4)
MCV RBC AUTO: 64 FL (ref 82–98)
MONOCYTES # BLD AUTO: 0.41 THOUSAND/ÂΜL (ref 0.17–1.22)
MONOCYTES NFR BLD AUTO: 7 % (ref 4–12)
NEUTROPHILS # BLD AUTO: 3.51 THOUSANDS/ÂΜL (ref 1.85–7.62)
NEUTS SEG NFR BLD AUTO: 58 % (ref 43–75)
NRBC BLD AUTO-RTO: 0 /100 WBCS
NUC STRESS EJECTION FRACTION: 70 %
PLATELET # BLD AUTO: 191 THOUSANDS/UL (ref 149–390)
POTASSIUM SERPL-SCNC: 3.6 MMOL/L (ref 3.5–5.3)
PROTOCOL NAME: NORMAL
RATE PRESSURE PRODUCT: NORMAL
RBC # BLD AUTO: 5.56 MILLION/UL (ref 3.81–5.12)
REASON FOR TERMINATION: NORMAL
SL CV REST NUCLEAR ISOTOPE DOSE: 11 MCI
SL CV STRESS NUCLEAR ISOTOPE DOSE: 32 MCI
SL CV STRESS RECOVERY BP: NORMAL MMHG
SL CV STRESS RECOVERY HR: 93 BPM
SL CV STRESS RECOVERY O2 SAT: 98 %
SODIUM SERPL-SCNC: 139 MMOL/L (ref 135–147)
STRESS ANGINA INDEX: 0
STRESS BASELINE BP: NORMAL MMHG
STRESS BASELINE HR: 69 BPM
STRESS O2 SAT REST: 98 %
STRESS PEAK HR: 153 BPM
STRESS POST ESTIMATED WORKLOAD: 10.1 METS
STRESS POST EXERCISE DUR MIN: 7 MIN
STRESS POST EXERCISE DUR MIN: 7 MIN
STRESS POST EXERCISE DUR SEC: 0 SEC
STRESS POST EXERCISE DUR SEC: 0 SEC
STRESS POST O2 SAT PEAK: 98 %
STRESS POST PEAK BP: 198 MMHG
STRESS POST PEAK HR: 153 BPM
STRESS POST PEAK SYSTOLIC BP: 198 MMHG
STRESS/REST PERFUSION RATIO: 1.1
TARGET HR FORMULA: NORMAL
WBC # BLD AUTO: 5.96 THOUSAND/UL (ref 4.31–10.16)

## 2024-01-08 PROCEDURE — 93018 CV STRESS TEST I&R ONLY: CPT | Performed by: INTERNAL MEDICINE

## 2024-01-08 PROCEDURE — 93017 CV STRESS TEST TRACING ONLY: CPT

## 2024-01-08 PROCEDURE — 99238 HOSP IP/OBS DSCHRG MGMT 30/<: CPT | Performed by: INTERNAL MEDICINE

## 2024-01-08 PROCEDURE — 85025 COMPLETE CBC W/AUTO DIFF WBC: CPT

## 2024-01-08 PROCEDURE — 99232 SBSQ HOSP IP/OBS MODERATE 35: CPT | Performed by: INTERNAL MEDICINE

## 2024-01-08 PROCEDURE — NC001 PR NO CHARGE: Performed by: INTERNAL MEDICINE

## 2024-01-08 PROCEDURE — 78452 HT MUSCLE IMAGE SPECT MULT: CPT

## 2024-01-08 PROCEDURE — A9502 TC99M TETROFOSMIN: HCPCS

## 2024-01-08 PROCEDURE — 80048 BASIC METABOLIC PNL TOTAL CA: CPT

## 2024-01-08 PROCEDURE — 78452 HT MUSCLE IMAGE SPECT MULT: CPT | Performed by: INTERNAL MEDICINE

## 2024-01-08 PROCEDURE — 93016 CV STRESS TEST SUPVJ ONLY: CPT | Performed by: INTERNAL MEDICINE

## 2024-01-08 NOTE — PROGRESS NOTES
"Cardiology Team 2 Progress Note - Floridalma Davison 53 y.o. female MRN: [id number]    Unit/Bed#: [facility] 531-01 Encounter: [id number]          Subjective:   Patient seen and examined.  No significant events overnight. Denies lightheadedness, dizziness, syncope, headache, vision changes, diaphoresis, chest pain, palpitations, shortness of breath, PND, orthopnea, nausea, vomiting, abdominal pain or lower extremity edema.    Hospital Course:   Floridalma Davison is a 53 y.o. year old female with a history of thalassemia.  She presented on 1/6/24 with chest pressure while doing normal morning activities.  She denied shortness of breath, palpitations, lightheadedness.  Patient had a recent abnormal exercise treadmill test through her 's work that showed inferior and lateral ST depressions with stress that went back to baseline during recovery.  EKG was sinus rhythm and unremarkable. Troponins were unremarkable.  Echo showed EF 65% without abnormalities.  Patient will undergo stress nuclear study on 1/8/24.     Vitals: Blood pressure 128/83, pulse 68, temperature 98.3 °F (36.8 °C), resp. rate 14, height 5' 2\" (1.575 m), weight 58.5 kg (129 lb), last menstrual period 11/05/2022, SpO2 96%, currently breastfeeding., Body mass index is 23.59 kg/m².,   Orthostatic Blood Pressures      Flowsheet Row Most Recent Value   Blood Pressure 128/83 filed at 01/08/2024 1031   Patient Position - Orthostatic VS Lying filed at 01/07/2024 2153              Intake/Output Summary (Last 24 hours) at 1/8/2024 1312  Last data filed at 1/8/2024 1217  Gross per 24 hour   Intake 646.13 ml   Output 1220 ml   Net -573.87 ml       Review of System:  Review of system was conducted and was negative except for as stated in the subjective course.    Physical Exam:    GEN: Floridalma Davison appears well, alert and oriented x 3, pleasant and cooperative   HEENT:  Normocephalic, atraumatic, anicteric, moist mucous membranes  NECK: No JVD or carotid bruits "   HEART: Regular rhythm, regular rate, normal S1 and S2, no murmurs, clicks, gallops or rubs   LUNGS: Clear to auscultation bilaterally; no wheezes, rales, or rhonchi; respiration nonlabored   ABDOMEN:  Normoactive bowel sounds, soft, no tenderness, no distention  EXTREMITIES: peripheral pulses palpable; no edema  NEURO: no gross focal findings; cranial nerves grossly intact   SKIN:  Dry, intact, warm to touch      Current Facility-Administered Medications:     acetaminophen (TYLENOL) tablet 975 mg, 975 mg, Oral, Q8H PRN, Chris Lo MD    nitroglycerin (NITROSTAT) SL tablet 0.4 mg, 0.4 mg, Sublingual, Q5 Min PRN, Chris Lo MD    oxyCODONE (ROXICODONE) split tablet 2.5 mg, 2.5 mg, Oral, Q6H PRN, Chris Lo MD    Labs & Results:  Results from last 7 days   Lab Units 01/06/24  1653 01/06/24  1431 01/06/24  1220   HS TNI 0HR ng/L  --   --  4   HS TNI 2HR ng/L  --  4  --    HS TNI 4HR ng/L 4  --   --          Results from last 7 days   Lab Units 01/08/24  0444 01/07/24  1013 01/06/24  1220   POTASSIUM mmol/L 3.6 3.6 4.1   CO2 mmol/L 27 29 26   CHLORIDE mmol/L 106 107 107   BUN mg/dL 13 11 12   CREATININE mg/dL 0.75 0.79 0.79     Results from last 7 days   Lab Units 01/08/24  0444 01/07/24  1013 01/06/24  1248   HEMOGLOBIN g/dL 11.1* 11.5 11.4*   HEMATOCRIT % 35.5 35.9 35.9   PLATELETS Thousands/uL 191 187 195     Results from last 7 days   Lab Units 01/06/24  1509   TRIGLYCERIDES mg/dL 62   HDL mg/dL 37*   LDL CALC mg/dL 75   HEMOGLOBIN A1C % 5.1       Telemetry:   Personally reviewed by Pushpa Jay: Unremarkable.      Imaging: None new.       VTE Prophylaxis: Sequential compression device (Venodyne)         Assessment:  Active Problems:    Thalassemia alpha carrier    Polyarthralgia    Chest pain    Assessment: 53-year-old female without significant medical history presented with chest pressure in the morning while doing normal activities of daily living and recent abnormal stress test.  Stress test  was reviewed and showed T wave inversions and ST depressions that developed during the exercise stress testing and then resolved within 1 minute of rest.  EKG, troponins, echo were unremarkable.      1. Chest pain   - Unremarkable EKG, troponins, echo   - No chest pain currently   - Follow-up on nuclear stress test       Plan:  - Follow-up on nuclear stress test   - If nuclear stress test is normal, likely discharge with outpatient follow-up       Case discussed and reviewed with Dr. Kailee David DO who agrees with my assessment and plan.    Thank you for involving us in the care of your patient.      Pushpa Jay  MS-4  [place]/  ke[employer]       ** Please Note: Fluency DirectDictation voice to text software may have been used in the creation of this document. **          Otc Regimen: Differin - use 2-3 nights a week for maintenance starting 1 month after last Accutane dose Detail Level: Zone Plan: Patient has a few accutane pills left. She will finish these and then discontinue, as she has reached her goal dose and has no new breakouts the last 2 months.\\n\\nShe had a few pinpoint erythematous papules around the nose, that appears consistent with perioral dermatitis. She notes she has been blowing her nose a lot and thinks it may just be due to irritation. Discussed if these bumps start to worsen/spread or don't resolve, she can call and we can send in a prescription for her (metronidazole 0.75% cream to use BID until clear). Otherwise follow up prn

## 2024-01-08 NOTE — UTILIZATION REVIEW
Initial Clinical Review    Admission: Date/Time/Statement:   Admission Orders (From admission, onward)       Ordered        01/06/24 1425  INPATIENT ADMISSION  Once                          Orders Placed This Encounter   Procedures    Inpatient Admission     Standing Status:   Standing     Number of Occurrences:   1     Order Specific Question:   Level of Care     Answer:   Med Surg [16]     Order Specific Question:   Estimated length of stay     Answer:   More than 2 Midnights     Order Specific Question:   Certification     Answer:   I certify that inpatient services are medically necessary for this patient for a duration of greater than two midnights. See H&P and MD Progress Notes for additional information about the patient's course of treatment.     ED Arrival Information       Expected   -    Arrival   1/6/2024 11:49    Acuity   Urgent              Means of arrival   Walk-In    Escorted by   Family Member    Service   [facility]    Admission type   Emergency              Arrival complaint   SOB             Chief Complaint   Patient presents with    Shortness of Breath     Pt c/o chest pressure and tightness for the past few days       Initial Presentation: 53 y.o. female w. PMH of polyarthralgia, thalassemia carrier presented to the ED from home w/ chest ain.   Pt reports  that she woke up around 7 AM with chest pain on the left side that felt like a pressure, rate pain 2/10. Reporst SOB for several months and had a stress test yesterday w/c showed e some ST depressions and T wave inversions.   In the ED,  troponin 4, 2 and 4 hours still pending.      Admit as Inpatient for evaluation and treatment of chest pain.  Plan: start on heparin gtt, aspirin 325, lipitor 40, lopressor 12.5 BID. Telemetry. Cardiology consulted. A1c, lipid panel, TSH    Date:  01/07  Day 2:   Cardiology Consult:Chest pain with an abnormal stress test:  Troponins have been negative and EKG without ST changes.  Stress test EKGs  reviewed, there are T wave inversions and ST depressions that developed during exercise stress test which improved within 1 minute of rest.  She denied chest pain during the stress test.   Plan: proceed with nuclear myocardial perfusion scan, would prefer CTA to receive calcium score as well however not available currently. DC heparin, dc lopressor.      ED Triage Vitals [01/06/24 1152]   Temperature Pulse Respirations Blood Pressure SpO2   97.6 °F (36.4 °C) 69 18 153/99 98 %      Temp Source Heart Rate Source Patient Position - Orthostatic VS BP Location FiO2 (%)   Oral Monitor Lying Right arm --      Pain Score       7          Wt Readings from Last 1 Encounters:   01/07/24 58.5 kg (129 lb)     Additional Vital Signs:   Date/Time Temp Pulse Resp BP MAP (mmHg) SpO2 O2 Device Patient Position - Orthostatic VS   01/08/24 07:07:19 98.3 °F (36.8 °C) 59 14 113/74 87 96 % -- --   01/08/24 01:51:20 -- 66 -- 114/75 88 96 % -- --   01/07/24 21:53:53 98.6 °F (37 °C) 62 15 137/80 99 95 % -- Lying   01/07/24 19:52:51 97.9 °F (36.6 °C) 65 18 126/74 91 96 % -- --   01/07/24 15:13:46 98 °F (36.7 °C) 62 15 126/76 93 94 % None (Room air) Lying   01/07/24 14:18:55 -- 62 -- 127/77 94 95 % -- --   01/07/24 0845 -- 61 -- 131/81 -- -- -- --   01/07/24 07:26:08 97.5 °F (36.4 °C) 61 -- 131/81 98 96 % -- --   01/07/24 0726 -- -- -- -- -- 96 % None (Room air) --   01/07/24 0700 -- 56 13 108/55 78 96 % -- --   01/07/24 0600 -- 56 14 127/58 83 96 % None (Room air) Lying   01/07/24 0400 -- 60 16 125/68 89 95 % None (Room air) Lying   01/07/24 0000 -- 58 14 128/64 89 92 % None (Room air) Lying   01/06/24 2300 -- 60 18 139/67 96 94 % None (Room air) Lying   01/06/24 2200 -- 60 17 152/81 110 94 % None (Room air) Lying   01/06/24 2150 -- 60 -- -- -- -- -- --   01/06/24 2100 -- 62 12 132/78 100 96 % None (Room air) Lying   01/06/24 1900 -- 64 15 141/66 94 95 % None (Room air) Lying   01/06/24 1800 -- 64 16 131/81 101 97 % None (Room air) --    01/06/24 1600 -- 60 16 160/76 108 97 % None (Room air) --   01/06/24 1511 -- 60 13 152/78 105 97 % -- --   01/06/24 1300 -- 60 17 146/90 114 97 % None (Room air) --       Pertinent Labs/Diagnostic Test Results:   XR chest 2 views   Final Result by Priscila Shearer MD (01/06 2038)      No acute cardiopulmonary disease.               Workstation performed: [id number]           01/06 EKG result:Normal sinus rhythm  Nonspecific T wave abnormality      01/07   ECHO:     Left Ventricle: Left ventricular cavity size is normal. Wall thickness is normal. The left ventricular ejection fraction is 65%. Systolic function is normal. Wall motion is normal. Diastolic function is normal.    Right Ventricle: Right ventricular cavity size is normal. Systolic function is normal.    No significant valvular abnormalities.    EKG result:Normal sinus rhythm  Nonspecific T wave abnormality        Results from last 7 days   Lab Units 01/08/24 0444 01/07/24  1013 01/06/24  1248   WBC Thousand/uL 5.96 5.54 5.79   HEMOGLOBIN g/dL 11.1* 11.5 11.4*   HEMATOCRIT % 35.5 35.9 35.9   PLATELETS Thousands/uL 191 187 195   NEUTROS ABS Thousands/µL 3.51 3.41 3.60         Results from last 7 days   Lab Units 01/08/24 0444 01/07/24  1013 01/06/24  1220   SODIUM mmol/L 139 141 141   POTASSIUM mmol/L 3.6 3.6 4.1   CHLORIDE mmol/L 106 107 107   CO2 mmol/L 27 29 26   ANION GAP mmol/L 6 5 8   BUN mg/dL 13 11 12   CREATININE mg/dL 0.75 0.79 0.79   EGFR ml/min/1.73sq m 90 85 85   CALCIUM mg/dL 8.6 8.8 9.1     Results from last 7 days   Lab Units 01/06/24  1220   AST U/L 18   ALT U/L 14   ALK PHOS U/L 55   TOTAL PROTEIN g/dL 7.1   ALBUMIN g/dL 4.2   TOTAL BILIRUBIN mg/dL 1.95*         Results from last 7 days   Lab Units 01/08/24  0444 01/07/24  1013 01/06/24  1220   GLUCOSE RANDOM mg/dL 89 107 96         Results from last 7 days   Lab Units 01/06/24  1509   HEMOGLOBIN A1C % 5.1   EAG mg/dl 100         Results from last 7 days   Lab Units 01/06/24  9709  01/06/24  1431 01/06/24  1220   HS TNI 0HR ng/L  --   --  4   HS TNI 2HR ng/L  --  4  --    HSTNI D2 ng/L  --  0  --    HS TNI 4HR ng/L 4  --   --    HSTNI D4 ng/L 0  --   --          Results from last 7 days   Lab Units 01/07/24  0931 01/07/24  0547 01/06/24  2150 01/06/24  1431   PROTIME seconds  --  14.0  --  13.8   INR   --  1.09  --  1.07   PTT seconds 71* 90* 58* 27     Results from last 7 days   Lab Units 01/06/24  1509   TSH 3RD GENERATON uIU/mL 1.748               ED Treatment:   Medication Administration from 01/06/2024 1149 to 01/07/2024 0714         Date/Time Order Dose Route Action     01/06/2024 1235 EST acetaminophen (TYLENOL) tablet 650 mg 650 mg Oral Given     01/06/2024 1541 EST heparin (porcine) injection 3,300 Units 3,300 Units Intravenous Given     01/06/2024 2236 EST heparin (porcine) 25,000 units in 0.45% NaCl 250 mL infusion (premix) 14 Units/kg/hr Intravenous Rate/Dose Change     01/06/2024 1901 EST heparin (porcine) 25,000 units in 0.45% NaCl 250 mL infusion (premix) 12 Units/kg/hr Intravenous Handoff     01/06/2024 1540 EST heparin (porcine) 25,000 units in 0.45% NaCl 250 mL infusion (premix) 12 Units/kg/hr Intravenous New Bag     01/06/2024 2235 EST heparin (porcine) injection 1,650 Units 1,650 Units Intravenous Given     01/06/2024 2150 EST metoprolol tartrate (LOPRESSOR) partial tablet 12.5 mg 12.5 mg Oral Given     01/06/2024 1541 EST atorvastatin (LIPITOR) tablet 40 mg 40 mg Oral Given     01/06/2024 1622 EST aspirin chewable tablet 325 mg 325 mg Oral Given          Past Medical History:   Diagnosis Date    Abnormal Pap smear of cervix     6 yrs ago    Female infertility     Miscarriage     2015, 2016    Recurrent pregnancy loss, antepartum condition or complication     x 2 2015,-D & C; 2016-chemical pregnancy    Thalassemia     carrier    Urinary tract infection     last episode 2019    Varicella     childhood chickenpox     Present on Admission:   Polyarthralgia   Thalassemia alpha  carrier      Admitting Diagnosis: Chest pain [R07.9]  SOB (shortness of breath) [R06.02]  Dyspnea on exertion [R06.09]  Abnormal cardiovascular stress test [R94.39]  Age/Sex: 53 y.o. female  Admission Orders:  SCD  Tele monitoring    Scheduled Medications:     Continuous IV Infusions:  heparin (porcine) 25,000 units in 0.45% NaCl 250 mL infusion (premix)  Rate: 1.7-11 mL/hr Dose: 3-20 Units/kg/hr  Weight Dosing Info: 55 kg (Order-Specific)  Freq: Titrated Route: IV  Last Dose: Stopped (01/07/24 1415)  Start: 01/06/24 1445 End: 01/07/24 1420        PRN Meds:  acetaminophen, 975 mg, Oral, Q8H PRN  nitroglycerin, 0.4 mg, Sublingual, Q5 Min PRN  oxyCODONE, 2.5 mg, Oral, Q6H PRN        IP CONSULT TO CARDIOLOGY    Network Utilization Review Department  ATTENTION: Please call with any questions or concerns to [phone] and carefully listen to the prompts so that you are directed to the right person. All voicemails are confidential.   For Discharge needs, contact Care Management DC Support Team at [phone] opt. 2  Send all requests for admission clinical reviews, approved or denied determinations and any other requests to dedicated fax number below belonging to the campus where the patient is receiving treatment. List of dedicated fax numbers for the Facilities:  FACILITY NAME UR FAX NUMBER   ADMISSION DENIALS (Administrative/Medical Necessity) [phone]   DISCHARGE SUPPORT TEAM (NETWORK) [phone]   PARENT CHILD HEALTH (Maternity/NICU/Pediatrics) [phone]   [facility] [phone]   [facility] [phone]   [facility] [phone]   [facility] [phone]   [facility] [phone]   [facility] [phone]   [facility] [phone]   [facility] [phone]   [facility]  [facility] [phone]   [facility] [phone]   [facility] [phone]

## 2024-01-08 NOTE — PROGRESS NOTES
INTERNAL MEDICINE RESIDENCY PROGRESS NOTE     Name: Floridalma Davison   Age & Sex: 53 y.o. female   MRN: [id number]  Unit/Bed#: [facility] 531-01   Encounter: [id number]  Team: SOD Team C     PATIENT INFORMATION     Name: Floridalma Davison   Age & Sex: 53 y.o. female   MRN: [id number]  Hospital Stay Days: 2    ASSESSMENT/PLAN     Active Problems:    Thalassemia alpha carrier    Polyarthralgia    Chest pain      Chest pain  Assessment & Plan  Patient presenting with chest pain since 7 AM.  Patient states it is a pressure sensation on her left/center of her chest that is now resolved.  -Recently had a stress test done yesterday which was present at bedside due to being from an outside facility for shortness of breath over the last several months    -No official read on stress test but did note ST depressions and T wave inversions  On repeat ECG here, shows resolution of ST depressions    -Per cardiology, stress test likely false positive ideal test would be CTA coronaries but unable to be performed on site    Echo: EF 65% no wall motion abnormalities     Per cardiology, d/c heparin gtt, aspirin 325, lipitor 40, lopressor 12.5 BID   Nuclear stress test pending  CTA coronaries at outside facility pending nuclear stress test results   A1c, lipid panel, TSH wnl    Polyarthralgia  Assessment & Plan  Minimal past medical history apart from polyarthralgia seen by PCP.  -CRP, ESR, TOM, rheumatologic workup was ordered outpatient but this was never done  -Can do further workup outpatient    Thalassemia alpha carrier  Assessment & Plan  Patient is known to be a thalassemia alpha carrier with baseline anemia of 10-11        Disposition: continue inpatient management      SUBJECTIVE     Patient seen and examined, resting comfortably in no apparent distress. No acute events overnight. No complaints at this time. Patient reports CP has not occurred since Saturday evening. Denies SOB, nausea, vomiting, palpitations, orthopnea.      OBJECTIVE     Vitals:    24 2153 24 0151 24 0707 24 1031   BP: 137/80 114/75 113/74 128/83   BP Location: Right arm      Pulse: 62 66 59 68   Resp: 15  14 14   Temp: 98.6 °F (37 °C)  98.3 °F (36.8 °C)    TempSrc: Oral      SpO2: 95% 96% 96% 96%   Weight:       Height:          Temperature:   Temp (24hrs), Av.2 °F (36.8 °C), Min:97.9 °F (36.6 °C), Max:98.6 °F (37 °C)    Temperature: 98.3 °F (36.8 °C)  Intake & Output:  I/O          07    P.O.  480 118    I.V. (mL/kg)  166.3 (2.8)     Total Intake(mL/kg)  646.3 (11) 118 (2)    Urine (mL/kg/hr)  1220 (0.9)     Total Output  1220     Net  -573.7 +118           Unmeasured Urine Occurrence  2 x           Weights:   IBW (Ideal Body Weight): 50.1 kg    Body mass index is 23.59 kg/m².  Weight (last 2 days)       Date/Time Weight    24 0845 58.5 (129)    24 0700 58.9 (129.85)    24 2200 59 (130)          Physical Exam  Vitals and nursing note reviewed.   Constitutional:       General: She is not in acute distress.     Appearance: Normal appearance.   HENT:      Head: Normocephalic and atraumatic.   Eyes:      General: No scleral icterus.     Conjunctiva/sclera: Conjunctivae normal.   Cardiovascular:      Rate and Rhythm: Normal rate and regular rhythm.      Pulses: Normal pulses.      Heart sounds: Normal heart sounds. No murmur heard.  Pulmonary:      Effort: Pulmonary effort is normal. No respiratory distress.      Breath sounds: Normal breath sounds.   Abdominal:      General: Abdomen is flat. Bowel sounds are normal. There is no distension.      Palpations: Abdomen is soft.      Tenderness: There is no abdominal tenderness.   Skin:     General: Skin is warm and dry.   Neurological:      Mental Status: She is alert.   Psychiatric:         Mood and Affect: Mood normal.       LABORATORY DATA     Labs: I have personally reviewed pertinent reports.  Results from  "last 7 days   Lab Units 01/08/24  0444 01/07/24  1013 01/06/24  1248   WBC Thousand/uL 5.96 5.54 5.79   HEMOGLOBIN g/dL 11.1* 11.5 11.4*   HEMATOCRIT % 35.5 35.9 35.9   PLATELETS Thousands/uL 191 187 195   NEUTROS PCT % 58 62 62   MONOS PCT % 7 7 6   EOS PCT % 5 5 4      Results from last 7 days   Lab Units 01/08/24  0444 01/07/24  1013 01/06/24  1220   POTASSIUM mmol/L 3.6 3.6 4.1   CHLORIDE mmol/L 106 107 107   CO2 mmol/L 27 29 26   BUN mg/dL 13 11 12   CREATININE mg/dL 0.75 0.79 0.79   CALCIUM mg/dL 8.6 8.8 9.1   ALK PHOS U/L  --   --  55   ALT U/L  --   --  14   AST U/L  --   --  18              Results from last 7 days   Lab Units 01/07/24  0931 01/07/24  0547 01/06/24  2150 01/06/24  1431   INR   --  1.09  --  1.07   PTT seconds 71* 90* 58* 27               IMAGING & DIAGNOSTIC TESTING     Radiology Results: I have personally reviewed pertinent reports.  XR chest 2 views    Result Date: 1/6/2024  Impression: No acute cardiopulmonary disease. Workstation performed: [id number]     Other Diagnostic Testing: I have personally reviewed pertinent reports.    ACTIVE MEDICATIONS     Current Facility-Administered Medications   Medication Dose Route Frequency    acetaminophen (TYLENOL) tablet 975 mg  975 mg Oral Q8H PRN    nitroglycerin (NITROSTAT) SL tablet 0.4 mg  0.4 mg Sublingual Q5 Min PRN    oxyCODONE (ROXICODONE) split tablet 2.5 mg  2.5 mg Oral Q6H PRN       VTE Mechanical Prophylaxis: sequential compression device    Portions of the record may have been created with voice recognition software.  Occasional wrong word or \"sound a like\" substitutions may have occurred due to the inherent limitations of voice recognition software.  Read the chart carefully and recognize, using context, where substitutions have occurred.  ==  Brit Melchor  Reviewed by Jacob Villatoro MD   [facility]  Internal Medicine Residency PGY-1      "

## 2024-01-08 NOTE — DISCHARGE SUMMARY
INTERNAL MEDICINE RESIDENCY DISCHARGE SUMMARY     Floridalma Davison   53 y.o. female  MRN: [id number]  Room/Bed: [facility] 531/[facility] 531-01     [facility] BE [facility] 5 MED SURG/SD   Encounter: [id number]    Principal Problem:    Chest pain  Active Problems:    Thalassemia alpha carrier    Polyarthralgia      * Chest pain  Assessment & Plan  Patient presenting with chest pain since 7 AM.  Patient states it is a pressure sensation on her left/center of her chest that is now resolved.  -Recently had a stress test done yesterday which was present at bedside due to being from an outside facility for shortness of breath over the last several months    -No official read on stress test but did note ST depressions and T wave inversions  On repeat ECG here, shows resolution of ST depressions    -Per cardiology, stress test likely false positive ideal test would be CTA coronaries but unable to be performed on site    Echo: EF 65% no wall motion abnormalities     Nuclear stress test:    Stress ECG: Horizontal ST depression in the inferior, inferolateral and anterior leads (II, III, aVF, V3, V4, V5 and V6) is noted. There were no arrhythmias during recovery. . The stress ECG is abnormal after maximal exercise, without reproduction of symptoms.    Perfusion Defect Conclusion: The stress/rest perfusion ratio is 1.10. There is no evidence of transient ischemic dilation (TID). Myocardial wall thickness appears to be increased, consistent with left ventricular hypertrophy.    Perfusion: There are no perfusion defects.    Stress Function: Left ventricular function post-stress is normal. Stress ejection fraction is 70%.    Stress Combined Conclusion: Left ventricular perfusion is normal.     Most likely normal exercise nuclear medicine study with normal perfusion. Notable ECG changes likely from LVH, with hypertensive response to exercise.        Per cardiology, d/c heparin gtt, aspirin 325, lipitor  "40, lopressor 12.5 BID   CTA coronaries at outside facility pending nuclear stress test results   A1c, lipid panel, TSH wnl  Follow-up with cardiology outpatient, appt 1/11/24    Polyarthralgia  Assessment & Plan  Minimal past medical history apart from polyarthralgia seen by PCP.  -CRP, ESR, TOM, rheumatologic workup was ordered outpatient but this was never done  -Can do further workup outpatient    Thalassemia alpha carrier  Assessment & Plan  Patient is known to be a thalassemia alpha carrier with baseline anemia of 10-11        DETAILS OF HOSPITAL COURSE     H&P per Dr. Fraser: \"Patient is a 53-year-old with minimal past medical history.  Patient has been experiencing shortness of breath for the last several months and had stress test at an outpatient facility yesterday.  Patient had the stress test at bedside which did indicate some ST depressions and T wave inversions. This morning patient states that she woke up around 7 AM with chest pain on the left side that felt like a pressure.  She rated the pain as a 2 out of 10 and nothing made this better or worse.  Patient denies any family history or past history of chest pain but more so exertional shortness of breath.  Patient has no other complaints at this time. ED course includes troponin 4, 2 and 4 hours still pending.  Creatinine 0.79, BUN 12, K4.1, hemoglobin 11.4, MCV 63.\"    While inpatient, Cardiology was consulted for further evaluation. Heparin gtt was discontinued. She remained chest pain free while admitted. Cardiology feels as though stress test was likely a false positive. Patient have ST and T wave changes that worsened with exercise. Although, these changes meet criteria for ischemia, there was a quick recovery to baseline. Per Cardiology, they recommended a CTA of coronaries which is exam not available at [facility]. In light of that, it was decided to pursue a nuclear stress test while inpatient. Stress test showed \"horizontal ST depression in the " "inferior, inferolateral and anterior leads (II, III, aVF, V3, V4, V5 and V6) is noted. There were no arrhythmias during recovery. . The stress ECG is abnormal after maximal exercise, without reproduction of symptoms.\" Per Cardiology, it was felt that this demonstrated a most likely normal exercise nuclear medicine study with normal perfusion. Notable ECG changes likely from LVH, with hypertensive response to exercise. It was recommended patient follow-up with cardiology outpatient, as well as with PCP within 1-2 weeks of discharge. Patient was symptom free at time of discharge and stable to return home.     DISCHARGE INFORMATION     Physical Exam  Vitals and nursing note reviewed.   Constitutional:       General: She is not in acute distress.     Appearance: Normal appearance. She is not ill-appearing or toxic-appearing.   HENT:      Head: Normocephalic and atraumatic.      Nose: No congestion.      Mouth/Throat:      Pharynx: Oropharynx is clear.   Eyes:      General: No scleral icterus.     Conjunctiva/sclera: Conjunctivae normal.   Cardiovascular:      Rate and Rhythm: Normal rate and regular rhythm.      Pulses: Normal pulses.      Heart sounds: Normal heart sounds. No murmur heard.  Pulmonary:      Effort: Pulmonary effort is normal. No respiratory distress.      Breath sounds: Normal breath sounds. No wheezing.   Abdominal:      General: Abdomen is flat. Bowel sounds are normal. There is no distension.      Palpations: Abdomen is soft.      Tenderness: There is no abdominal tenderness.   Musculoskeletal:         General: No swelling. Normal range of motion.      Cervical back: Normal range of motion.      Right lower leg: No edema.      Left lower leg: No edema.   Skin:     General: Skin is warm.      Capillary Refill: Capillary refill takes less than 2 seconds.      Coloration: Skin is not jaundiced.   Neurological:      General: No focal deficit present.      Mental Status: She is alert and oriented to " person, place, and time.   Psychiatric:         Mood and Affect: Mood normal.         Behavior: Behavior normal.         Thought Content: Thought content normal.         Judgment: Judgment normal.         PCP at Discharge: SCAR Cerda    Admitting Provider: Jarrett Irizarry MD  Admission Date: 1/6/2024    Discharge Provider: Jarrett Irizarry MD   Discharge Date: 1/8/24    Discharge Disposition: Home/Self Care  Discharge Condition: stable  Discharge with Lines: no    Discharge Diet: regular diet  Activity Restrictions: none  Test Results Pending at Discharge: N/a    Discharge Diagnoses:  Principal Problem:    Chest pain  Active Problems:    Thalassemia alpha carrier    Polyarthralgia  Resolved Problems:    * No resolved hospital problems. *      Consulting Providers: Cardiology      Diagnostic & Therapeutic Procedures Performed:  XR chest 2 views    Result Date: 1/6/2024  Impression: No acute cardiopulmonary disease. Workstation performed: [id number]       Code Status: Level 1 - Full Code  Advance Directive & Living Will: <no information>  Power of :    POLST:      Medications:       Medication List      CONTINUE taking these medications     methocarbamol 500 mg tablet; Commonly known as: ROBAXIN; Take 1 tablet   (500 mg total) by mouth 4 (four) times a day for 5 days     Current Discharge Medication List         Current Discharge Medication List        CONTINUE these medications which have NOT CHANGED    Details   methocarbamol (ROBAXIN) 500 mg tablet Take 1 tablet (500 mg total) by mouth 4 (four) times a day for 5 days  Qty: 20 tablet, Refills: 0    Associated Diagnoses: Strain of right quadriceps, initial encounter            Current Discharge Medication List         Current Discharge Medication List             Allergies:  Allergies   Allergen Reactions    Dicloxacillin Rash       FOLLOW-UP     PCP Outpatient Follow-up:  SCAR Cerda  Follow-up within 1-2 weeks of discharge    Consulting  "Providers Follow-up:  Cardiology follow-up 1/11/24    Active Issues Requiring Follow-up:   Follow-up chest pain    Discharge Statement:   I spent 45 minutes minutes discharging the patient. This time was spent on the day of discharge. I had direct contact with the patient on the day of discharge. Additional documentation is required if more than 30 minutes were spent on discharge.    Portions of the record may have been created with voice recognition software.  Occasional wrong word or \"sound a like\" substitutions may have occurred due to the inherent limitations of voice recognition software.  Read the chart carefully and recognize, using context, where substitutions have occurred.    ==  Delicia Flores MD  [facility]  Internal Medicine Resident PGY-II   "

## 2024-01-09 ENCOUNTER — TRANSITIONAL CARE MANAGEMENT (OUTPATIENT)
Dept: INTERNAL MEDICINE CLINIC | Facility: OTHER | Age: 54
End: 2024-01-09

## 2024-01-09 NOTE — UTILIZATION REVIEW
NOTIFICATION OF ADMISSION DISCHARGE   This is a Notification of Discharge from [facility]. Please be advised that this patient has been discharge from our facility. Below you will find the admission and discharge date and time including the patient’s disposition.   UTILIZATION REVIEW CONTACT:  Randi Mixon  Utilization   Network Utilization Review Department  Phone: [phone] x carefully listen to the prompts. All voicemails are confidential.  Email: [email or web address]     ADMISSION INFORMATION  PRESENTATION DATE: 1/6/2024 11:59 AM  OBERVATION ADMISSION DATE:   INPATIENT ADMISSION DATE: 1/6/24  2:25 PM   DISCHARGE DATE: 1/8/2024  6:29 PM   DISPOSITION:Home/Self Care    Network Utilization Review Department  ATTENTION: Please call with any questions or concerns to [phone] and carefully listen to the prompts so that you are directed to the right person. All voicemails are confidential.   For Discharge needs, contact Care Management DC Support Team at [phone] opt. 2  Send all requests for admission clinical reviews, approved or denied determinations and any other requests to dedicated fax number below belonging to the campus where the patient is receiving treatment. List of dedicated fax numbers for the Facilities:  FACILITY NAME UR FAX NUMBER   ADMISSION DENIALS (Administrative/Medical Necessity) [phone]   DISCHARGE SUPPORT TEAM ([facility]) [phone]   PARENT CHILD HEALTH (Maternity/NICU/Pediatrics) [phone]   [facility] [phone]   [facility] [phone]   [facility] [phone]   [facility] [phone]   [facility] [phone]   [facility] [phone]   [facility] [phone]   [facility] [phone]   [facility]  [facility] [phone]   [facility] [phone]   [facility] [phone]

## 2024-01-10 ENCOUNTER — OFFICE VISIT (OUTPATIENT)
Dept: INTERNAL MEDICINE CLINIC | Facility: OTHER | Age: 54
End: 2024-01-10
Payer: COMMERCIAL

## 2024-01-10 VITALS
DIASTOLIC BLOOD PRESSURE: 76 MMHG | WEIGHT: 131 LBS | BODY MASS INDEX: 24.73 KG/M2 | HEART RATE: 86 BPM | TEMPERATURE: 98.2 F | HEIGHT: 61 IN | OXYGEN SATURATION: 98 % | SYSTOLIC BLOOD PRESSURE: 110 MMHG

## 2024-01-10 DIAGNOSIS — R07.9 CHEST PAIN, UNSPECIFIED TYPE: Primary | ICD-10-CM

## 2024-01-10 DIAGNOSIS — Z12.31 ENCOUNTER FOR SCREENING MAMMOGRAM FOR BREAST CANCER: ICD-10-CM

## 2024-01-10 PROCEDURE — 99496 TRANSJ CARE MGMT HIGH F2F 7D: CPT | Performed by: NURSE PRACTITIONER

## 2024-01-10 NOTE — ASSESSMENT & PLAN NOTE
"-Hospitalization reviewed in detail with patient  -Asymptomatic during exam today  -Nuclear stress test on day of discharge \"most likely normal exercise nuclear medicine study with normal perfusion.  Notable ECG changes likely from LVH, with hypertensive response to exercise. \"  -Cardiology recommending coronary calcium CT  -Outpatient follow-up with cardiology scheduled for tomorrow    "

## 2024-01-10 NOTE — PROGRESS NOTES
"[facility] - [facility] PRIMARY [facility] [place]  Transition of Care Visit  Patient ID: Floridalma Davison    : 1970  Age/Gender: 53 y.o. female     DATE: 1/10/2024      Assessment/Plan:    Chest pain  -Hospitalization reviewed in detail with patient  -Asymptomatic during exam today  -Nuclear stress test on day of discharge \"most likely normal exercise nuclear medicine study with normal perfusion.  Notable ECG changes likely from LVH, with hypertensive response to exercise. \"  -Cardiology recommending coronary calcium CT  -Outpatient follow-up with cardiology scheduled for tomorrow      Diagnoses and all orders for this visit:    Chest pain, unspecified type    Encounter for screening mammogram for breast cancer  -     Mammo screening bilateral w 3d & cad; Future          Subjective:       Floridalma Davison is a 53 y.o. female who is here for TCM follow up on 1/10/2024.  During the TCM phone call the patient stated:    TCM Call       Date and time call was made  2024  9:03 AM    Hospital care reviewed  Records reviewed    Patient was hospitialized at  [facility]    Date of Admission  24    Date of discharge  24    Diagnosis  chest pain    Disposition  Home    Current Symptoms  None          TCM Call       Post hospital issues  None    Scheduled for follow up?  Yes    Did you obtain your prescribed medications  Yes    Do you need help managing your prescriptions or medications  No    Is transportation to your appointment needed  No    I have advised the patient to call PCP with any new or worsening symptoms  Lane Downey [facility]            HPI   Patient presents today for TCM from recent hospitalization at [facility] from -.  She tells me that she initially went for routine cardiac screening offered by her 's work and her exercise stress test came back abnormal and she was told there were ST depressions and T wave inversions and that " "she needed to file follow-up with a cardiologist as soon as possible.  She states after finding out this information she started to have chest pain which she attributes likely to anxiety, but took herself to the hospital and was admitted due to chest pain and an abnormal stress test.  During her hospitalization she was seen by cardiology who recommended nuclear exercise stress test.  Serial troponins were normal.   Echo was normal    Left Ventricle: Left ventricular cavity size is normal. Wall thickness is normal. The left ventricular ejection fraction is 65%. Systolic function is normal. Wall motion is normal. Diastolic function is normal.    Right Ventricle: Right ventricular cavity size is normal. Systolic function is normal.    No significant valvular abnormalities.    Nuclear stress test continue to show an abnormal EKG after maximal exercise without reproduction of symptoms.  The myocardial wall thickness appear to be increased, consistent with left ventricular hypertrophy per the report but there were no perfusion defects.  Her left ventricular function post-rest was normal with a stress ejection fraction of 70%.  The interpretation was read as \"most likely normal exercise nuclear medicine study with normal perfusion.  Notable ECG changes likely from LVH, with hypertensive response to exercise.     Patient's blood pressure is well-controlled today without medication.  She is asymptomatic and has follow-up tomorrow with cardiology.     The following portions of the patient's history were reviewed and updated as appropriate: allergies, current medications, past family history, past medical history, past social history, past surgical history, and problem list.    Review of Systems   Constitutional:  Negative for activity change, appetite change, fever and unexpected weight change.   Respiratory:  Negative for chest tightness and shortness of breath.    Cardiovascular:  Negative for chest pain and leg swelling. "         Patient Active Problem List   Diagnosis    Left wrist pain    Mass of joint of left wrist    Annual physical exam    Thalassemia alpha carrier    Polyarthralgia    Abdominal discomfort    Chest pain       Past Medical History:   Diagnosis Date    Abnormal Pap smear of cervix     6 yrs ago    Female infertility     Miscarriage     , 2016    Recurrent pregnancy loss, antepartum condition or complication     x 2 2015,-D & C; 2016-chemical pregnancy    Thalassemia     carrier    Urinary tract infection     last episode 2019    Varicella     childhood chickenpox       Past Surgical History:   Procedure Laterality Date    DILATION AND CURETTAGE OF UTERUS      EYE SURGERY      [place]  DELIVERY ONLY N/A 2020    Procedure:  SECTION ();  Surgeon: Juliana Cuenca MD;  Location: [facility] ;  Service: Obstetrics    TOOTH EXTRACTION  2022    Molar removed    WISDOM TOOTH EXTRACTION         No current outpatient medications on file.    Allergies   Allergen Reactions    Dicloxacillin Rash       Social History     Socioeconomic History    Marital status: /Civil Union     Spouse name: None    Number of children: None    Years of education: None    Highest education level: None   Occupational History    None   Tobacco Use    Smoking status: Never    Smokeless tobacco: Never   Vaping Use    Vaping status: Never Used   Substance and Sexual Activity    Alcohol use: Yes     Alcohol/week: 2.0 standard drinks of alcohol     Types: 2 Glasses of wine per week     Comment: occ    Drug use: No    Sexual activity: Yes     Partners: Male     Birth control/protection: Male Sterilization     Comment: vasectomy-    Other Topics Concern    None   Social History Narrative    None     Social Determinants of Health     Financial Resource Strain: Not on file   Food Insecurity: Not on file   Transportation Needs: Not on file   Physical Activity: Not on file   Stress: Not on file   Social  "Connections: Not on file   Intimate Partner Violence: Not on file   Housing Stability: Not on file       Family History   Problem Relation Age of Onset    Hypertension Mother     Diabetes Mother     Cancer Mother     No Known Problems Father     Heart failure Maternal Grandmother     Arthritis Maternal Grandmother     Cancer Maternal Grandfather     Diabetes Maternal Grandfather     No Known Problems Paternal Grandmother     No Known Problems Paternal Grandfather     Hypertension Brother        PHQ-2/9 Depression Screening    Little interest or pleasure in doing things: 0 - not at all  Feeling down, depressed, or hopeless: 0 - not at all  PHQ-2 Score: 0  PHQ-2 Interpretation: Negative depression screen         Health Maintenance   Topic Date Due    Pneumococcal Vaccine: Pediatrics (0 to 5 Years) and At-Risk Patients (6 to 64 Years) (1 - PCV) Never done    Zoster Vaccine (1 of 2) Never done    Influenza Vaccine (1) 09/01/2023    COVID-19 Vaccine (3 - 2023-24 season) 09/01/2023    Annual Physical  03/29/2024    Breast Cancer Screening: Mammogram  04/03/2024    Depression Screening  01/10/2025    Cervical Cancer Screening  11/14/2025    DTaP,Tdap,and Td Vaccines (2 - Td or Tdap) 01/07/2030    Colorectal Cancer Screening  03/04/2032    HIV Screening  Completed    Hepatitis C Screening  Completed    HIB Vaccine  Aged Out    IPV Vaccine  Aged Out    Hepatitis A Vaccine  Aged Out    Meningococcal ACWY Vaccine  Aged Out    HPV Vaccine  Aged Out       Immunization History   Administered Date(s) Administered    COVID-19 MODERNA VACC 0.5 ML IM 01/26/2021, 02/23/2021    INFLUENZA 12/05/2017    Influenza, injectable, quadrivalent, preservative free 0.5 mL 09/10/2019    Tdap 01/07/2020        Objective:  Vitals:    01/10/24 1451   BP: 110/76   BP Location: Left arm   Patient Position: Sitting   Cuff Size: Standard   Pulse: 86   Temp: 98.2 °F (36.8 °C)   TempSrc: Temporal   SpO2: 98%   Weight: 59.4 kg (131 lb)   Height: 5' 1\" " (1.549 m)     Wt Readings from Last 3 Encounters:   01/10/24 59.4 kg (131 lb)   01/08/24 58.5 kg (129 lb)   10/17/23 59 kg (130 lb)     Body mass index is 24.75 kg/m².  No results found.       Physical Exam  Vitals reviewed.   Constitutional:       General: She is not in acute distress.     Appearance: Normal appearance. She is normal weight. She is not diaphoretic.   HENT:      Head: Normocephalic and atraumatic.   Eyes:      Extraocular Movements: Extraocular movements intact.      Conjunctiva/sclera: Conjunctivae normal.      Pupils: Pupils are equal, round, and reactive to light.   Neck:      Vascular: No carotid bruit.   Cardiovascular:      Rate and Rhythm: Normal rate and regular rhythm.      Heart sounds: Normal heart sounds. No murmur heard.  Pulmonary:      Effort: Pulmonary effort is normal. No respiratory distress.      Breath sounds: Normal breath sounds. No wheezing, rhonchi or rales.   Musculoskeletal:      Right lower leg: No edema.      Left lower leg: No edema.   Neurological:      Mental Status: She is alert and oriented to person, place, and time. Mental status is at baseline.   Psychiatric:         Mood and Affect: Mood normal.         Behavior: Behavior normal.         Thought Content: Thought content normal.         Judgment: Judgment normal.             Future Appointments   Date Time Provider Department Center   1/11/2024 10:20 AM Marti Florentino MD [facility]   7/11/2024  3:00 PM SC[facility] Sarah [facility]       SCAR Sarah  [facility]    Patient Care Team:  SCAR Sarah as PCP - General (Internal Medicine)  Hao Tena MD (Maternal and Fetal Medicine)  Palak Olivares MD (Maternal and Fetal Medicine)  Arik Bacon MD (Maternal and Fetal Medicine)  Maddie Diaz MD (Maternal and Fetal Medicine)  Donnie Dorantes MD (Maternal and Fetal Medicine)  Chrissie Sheldon MD (Maternal  and Fetal Medicine)

## 2024-01-11 ENCOUNTER — OFFICE VISIT (OUTPATIENT)
Dept: CARDIOLOGY CLINIC | Facility: CLINIC | Age: 54
End: 2024-01-11
Payer: COMMERCIAL

## 2024-01-11 VITALS
HEIGHT: 61 IN | WEIGHT: 132.7 LBS | SYSTOLIC BLOOD PRESSURE: 140 MMHG | HEART RATE: 68 BPM | DIASTOLIC BLOOD PRESSURE: 82 MMHG | BODY MASS INDEX: 25.05 KG/M2 | OXYGEN SATURATION: 98 %

## 2024-01-11 DIAGNOSIS — R94.39 ABNORMAL CARDIOVASCULAR STRESS TEST: Primary | ICD-10-CM

## 2024-01-11 PROCEDURE — 99214 OFFICE O/P EST MOD 30 MIN: CPT | Performed by: INTERNAL MEDICINE

## 2024-01-11 NOTE — LETTER
January 11, 2024     Chele Bishop DO  [address] [place] [address]    Patient: Floridalma Davison   YOB: 1970   Date of Visit: 1/11/2024       Dear Dr. Bishop:    Thank you for referring Floridalma Davison to me for evaluation. Below are my notes for this consultation.    If you have questions, please do not hesitate to call me. I look forward to following your patient along with you.         Sincerely,        Marti Florentino MD        CC: MD Marti Booth MD  1/11/2024 11:15 AM  Incomplete                                             Cardiology Follow Up    Floridalma Davison  1970  [id number]  [facility] [place]  [address] [facility]  [place] [id number] [phone]    No diagnosis found.    There are no diagnoses linked to this encounter.  I had the pleasure of seeing Floridalma Davison for a follow up visit.     INTERVAL HISTORY: none    History of the presenting illness, Discussion/Summary and My Plan are as follows:::      Floridalma is a pleasant 53-year-old without hypertension, diabetes or dyslipidemia, she has had regular medical care.  Rare tobacco use spanning 10 years-in college, none since.    Family history-positive for hypertension in mother and one of his siblings, she is estranged from her dad.  Has history, no known history of vascular disease    She did have a stress test in 2015 that showed excellent functional capacity without chest pains but with ECG changes reported equivocal.    From a physical activity standpoint, she is busy with her job and 4-year-old, tries to stay active, did a 10 miler in October 2023 without any issues although ran and walked for part of it.    She was offered a stress test through her 's employment, although she is completely asymptomatic with reported ECG changes, this worried her quite a bit and the following day, presented with chest pains to  [facility] and admitted.  Following this  she did an exercise nuclear perfusion study that again demonstrated  ECG changes-inferolateral ST segment depressions, during exercise with quick resolution in recovery and reappearance around 4 minutes of recovery.    She is here to discuss those results.  She is now asymptomatic, does not have any cardiac chest pains.  She also wishes to undergo a calcium score to put her mind at ease.    Plan:    Abnormal exercise stress test: These are most likely false positive changes, during her exercise stress test in the hospital, peak blood pressure was 198 systolic, need to keep an eye on her blood pressures, otherwise well-controlled  I personally reviewed her nuclear images which showed normal perfusion  Of note she did have ECG changes despite excellent functional capacity and absence of symptoms in 2015 as well  In the future-should stress testing be indicated-will need stress testing with imaging  Will check a calcium score-discussed that this would not be covered with insurance and it would be $99.  She is okay completing it.    Lipids are under control    Follow-up tentatively in 6 months with Dr. Anthony Balderrama or Dr. Elaine and if she is doing well at that point, can likely follow-up as needed       Latest Reference Range & Units 01/06/24 15:09   Cholesterol See Comment mg/dL 124   Triglycerides See Comment mg/dL 62   HDL >=50 mg/dL 37 (L)   LDL Calculated 0 - 100 mg/dL 75   (L): Data is abnormally low    Patient Active Problem List   Diagnosis   • Left wrist pain   • Mass of joint of left wrist   • Annual physical exam   • Thalassemia alpha carrier   • Polyarthralgia   • Abdominal discomfort   • Chest pain     Past Medical History:   Diagnosis Date   • Abnormal Pap smear of cervix     6 yrs ago   • Female infertility    • Miscarriage     2015, 2016   • Recurrent pregnancy loss, antepartum condition or complication     x 2 2015,-D & C; 2016-chemical pregnancy   • Thalassemia     carrier   • Urinary tract  infection     last episode 2019   • Varicella     childhood chickenpox     Social History     Socioeconomic History   • Marital status: /Civil Union     Spouse name: Not on file   • Number of children: Not on file   • Years of education: Not on file   • Highest education level: Not on file   Occupational History   • Not on file   Tobacco Use   • Smoking status: Never   • Smokeless tobacco: Never   Vaping Use   • Vaping status: Never Used   Substance and Sexual Activity   • Alcohol use: Yes     Alcohol/week: 2.0 standard drinks of alcohol     Types: 2 Glasses of wine per week     Comment: occ   • Drug use: No   • Sexual activity: Yes     Partners: Male     Birth control/protection: Male Sterilization     Comment: vasectomy-    Other Topics Concern   • Not on file   Social History Narrative   • Not on file     Social Determinants of Health     Financial Resource Strain: Not on file   Food Insecurity: Not on file   Transportation Needs: Not on file   Physical Activity: Not on file   Stress: Not on file   Social Connections: Not on file   Intimate Partner Violence: Not on file   Housing Stability: Not on file      Family History   Problem Relation Age of Onset   • Hypertension Mother    • Diabetes Mother    • Cancer Mother    • No Known Problems Father    • Heart failure Maternal Grandmother    • Arthritis Maternal Grandmother    • Cancer Maternal Grandfather    • Diabetes Maternal Grandfather    • No Known Problems Paternal Grandmother    • No Known Problems Paternal Grandfather    • Hypertension Brother      Past Surgical History:   Procedure Laterality Date   • DILATION AND CURETTAGE OF UTERUS     • EYE SURGERY     • [place]  DELIVERY ONLY N/A 2020    Procedure:  SECTION ();  Surgeon: Juliana Cuenca MD;  Location: [facility];  Service: Obstetrics   • TOOTH EXTRACTION  2022    Molar removed   • WISDOM TOOTH EXTRACTION       No current outpatient medications on  file.  Allergies   Allergen Reactions   • Dicloxacillin Rash       Imaging: NM Myocardial Perfusion Spect (Exercise Induced Stress and/or Rest)    Result Date: 1/8/2024  Narrative: •  Stress ECG: Horizontal ST depression in the inferior, inferolateral and anterior leads (II, III, aVF, V3, V4, V5 and V6) is noted. There were no arrhythmias during recovery. . The stress ECG is abnormal after maximal exercise, without reproduction of symptoms. •  Perfusion Defect Conclusion: The stress/rest perfusion ratio is 1.10. There is no evidence of transient ischemic dilation (TID). Myocardial wall thickness appears to be increased, consistent with left ventricular hypertrophy. •  Perfusion: There are no perfusion defects. •  Stress Function: Left ventricular function post-stress is normal. Stress ejection fraction is 70%. •  Stress Combined Conclusion: Left ventricular perfusion is normal. Most likely normal exercise nuclear medicine study with normal perfusion. Notable ECG changes likely from LVH, with hypertensive response to exercise.     Echo complete w/ contrast if indicated    Result Date: 1/7/2024  Narrative: •  Left Ventricle: Left ventricular cavity size is normal. Wall thickness is normal. The left ventricular ejection fraction is 65%. Systolic function is normal. Wall motion is normal. Diastolic function is normal. •  Right Ventricle: Right ventricular cavity size is normal. Systolic function is normal. •  No significant valvular abnormalities. No prior studies for comparison.    XR chest 2 views    Result Date: 1/6/2024  Narrative: CHEST INDICATION:   chest pain. COMPARISON: CXR 5/22/2015. EXAM PERFORMED/VIEWS:  XR CHEST PA & LATERAL. DUAL ENERGY SUBTRACTION. FINDINGS: Cardiomediastinal silhouette normal. No acute disease. Benign left apical scar. Stable benign calcification in the left midlung. No effusion or pneumothorax. Upper abdomen normal.  Bones normal for age.     Impression: No acute cardiopulmonary  "disease. Workstation performed: [id number]       Review of Systems:  Review of Systems   Constitutional: Negative.    HENT: Negative.     Eyes: Negative.    Respiratory: Negative.     Cardiovascular: Negative.    Endocrine: Negative.    Musculoskeletal: Negative.        Physical Exam:  /82 (BP Location: Right arm, Patient Position: Sitting, Cuff Size: Standard)   Pulse 68   Ht 5' 1\" (1.549 m)   Wt 60.2 kg (132 lb 11.2 oz)   LMP 11/05/2022 (Exact Date)   SpO2 98%   BMI 25.07 kg/m²   Physical Exam  Constitutional:       General: She is not in acute distress.     Appearance: She is not ill-appearing, toxic-appearing or diaphoretic.   HENT:      Nose: Nose normal. No congestion or rhinorrhea.   Neck:      Vascular: No carotid bruit.   Cardiovascular:      Pulses: Normal pulses.      Heart sounds: No murmur heard.     No friction rub. No gallop.   Pulmonary:      Effort: Pulmonary effort is normal. No respiratory distress.      Breath sounds: No stridor. No wheezing or rhonchi.   Abdominal:      General: Abdomen is flat. Bowel sounds are normal. There is no distension.      Palpations: There is no mass.      Tenderness: There is no abdominal tenderness.      Hernia: No hernia is present.   Musculoskeletal:         General: No swelling, tenderness, deformity or signs of injury. Normal range of motion.      Cervical back: Normal range of motion. No rigidity or tenderness.   Lymphadenopathy:      Cervical: No cervical adenopathy.   Skin:     General: Skin is warm.      Coloration: Skin is not jaundiced or pale.      Findings: No bruising or erythema.   Neurological:      Mental Status: She is alert.         This note was completed in part utilizing [employer] direct voice recognition software.   Grammatical errors, random word insertion, spelling mistakes, occasional wrong word or \"sound-alike\" substitutions and incomplete sentences may be an occasional consequence of the system secondary to software " limitations, ambient noise and hardware issues. At the time of dictation, efforts were made to edit, clarify and /or correct errors.  Please read the chart carefully and recognize, using context, where substitutions have occurred.  If you have any questions or concerns about the context, text or information contained within the body of this dictation, please contact myself, the provider, for further clarification.

## 2024-01-11 NOTE — PROGRESS NOTES
Cardiology Follow Up    Floridalma Davison  1970  [id number]  [facility] [place] [id number] [place] [address]  [place] GRIFF[place] [id number] [phone]    No diagnosis found.    There are no diagnoses linked to this encounter.  I had the pleasure of seeing Floridalma Davison for a follow up visit.     INTERVAL HISTORY: none    History of the presenting illness, Discussion/Summary and My Plan are as follows:::    Floridalma is a pleasant 53-year-old without hypertension, diabetes or dyslipidemia, she has had regular medical care.  Rare tobacco use spanning 10 years-in college, none since.    Family history-positive for hypertension in mother and one of his siblings, she is estranged from her dad.  Has history, no known history of vascular disease    She did have a stress test in 2015 that showed excellent functional capacity without chest pains but with ECG changes reported equivocal.    From a physical activity standpoint, she is busy with her job and 4-year-old, tries to stay active, did a 10 miler in October 2023 without any issues although ran and walked for part of it.    She was offered a stress test through her 's employment, although she is completely asymptomatic with reported ECG changes, this worried her quite a bit and the following day, presented with chest pains to  [facility] and admitted.  Following this she did an exercise nuclear perfusion study that again demonstrated  ECG changes-inferolateral ST segment depressions, during exercise with quick resolution in recovery and reappearance around 4 minutes of recovery.    She is here to discuss those results.  She is now asymptomatic, does not have any cardiac chest pains.  She also wishes to undergo a calcium score to put her mind at ease.    Plan:    Abnormal exercise stress test: These are most likely false positive changes, during her exercise stress test in the hospital, peak blood  pressure was 198 systolic, need to keep an eye on her blood pressures, otherwise well-controlled  I personally reviewed her nuclear images which showed normal perfusion  Of note she did have ECG changes despite excellent functional capacity and absence of symptoms in 2015 as well  In the future-should stress testing be indicated-will need stress testing with imaging  Will check a calcium score-discussed that this would not be covered with insurance and it would be $99.  She is okay completing it.    Lipids are under control    Follow-up tentatively in 6 months with Dr. Anthony Balderrama or Dr. Elaine and if she is doing well at that point, can likely follow-up as needed       Latest Reference Range & Units 01/06/24 15:09   Cholesterol See Comment mg/dL 124   Triglycerides See Comment mg/dL 62   HDL >=50 mg/dL 37 (L)   LDL Calculated 0 - 100 mg/dL 75   (L): Data is abnormally low    Patient Active Problem List   Diagnosis    Left wrist pain    Mass of joint of left wrist    Annual physical exam    Thalassemia alpha carrier    Polyarthralgia    Abdominal discomfort    Chest pain     Past Medical History:   Diagnosis Date    Abnormal Pap smear of cervix     6 yrs ago    Female infertility     Miscarriage     2015, 2016    Recurrent pregnancy loss, antepartum condition or complication     x 2 2015,-D & C; 2016-chemical pregnancy    Thalassemia     carrier    Urinary tract infection     last episode 2019    Varicella     childhood chickenpox     Social History     Socioeconomic History    Marital status: /Civil Union     Spouse name: Not on file    Number of children: Not on file    Years of education: Not on file    Highest education level: Not on file   Occupational History    Not on file   Tobacco Use    Smoking status: Never    Smokeless tobacco: Never   Vaping Use    Vaping status: Never Used   Substance and Sexual Activity    Alcohol use: Yes     Alcohol/week: 2.0 standard drinks of alcohol     Types: 2  Glasses of wine per week     Comment: occ    Drug use: No    Sexual activity: Yes     Partners: Male     Birth control/protection: Male Sterilization     Comment: vasectomy-    Other Topics Concern    Not on file   Social History Narrative    Not on file     Social Determinants of Health     Financial Resource Strain: Not on file   Food Insecurity: Not on file   Transportation Needs: Not on file   Physical Activity: Not on file   Stress: Not on file   Social Connections: Not on file   Intimate Partner Violence: Not on file   Housing Stability: Not on file      Family History   Problem Relation Age of Onset    Hypertension Mother     Diabetes Mother     Cancer Mother     No Known Problems Father     Heart failure Maternal Grandmother     Arthritis Maternal Grandmother     Cancer Maternal Grandfather     Diabetes Maternal Grandfather     No Known Problems Paternal Grandmother     No Known Problems Paternal Grandfather     Hypertension Brother      Past Surgical History:   Procedure Laterality Date    DILATION AND CURETTAGE OF UTERUS      E[place] SURGERY      [place]  DELIVERY ONLY N/A 2020    Procedure:  SECTION ();  Surgeon: Juliana Cuenca MD;  Location: [facility] ;  Service: Obstetrics    TOOTH EXTRACTION  2022    Molar removed    WISDOM TOOTH EXTRACTION       No current outpatient medications on file.  Allergies   Allergen Reactions    Dicloxacillin Rash       Imaging: NM Myocardial Perfusion Spect (Exercise Induced Stress and/or Rest)    Result Date: 2024  Narrative:   Stress ECG: Horizontal ST depression in the inferior, inferolateral and anterior leads (II, III, aVF, V3, V4, V5 and V6) is noted. There were no arrhythmias during recovery. . The stress ECG is abnormal after maximal exercise, without reproduction of symptoms.   Perfusion Defect Conclusion: The stress/rest perfusion ratio is 1.10. There is no evidence of transient ischemic dilation (TID). Myocardial wall  "thickness appears to be increased, consistent with left ventricular hypertrophy.   Perfusion: There are no perfusion defects.   Stress Function: Left ventricular function post-stress is normal. Stress ejection fraction is 70%.   Stress Combined Conclusion: Left ventricular perfusion is normal. Most likely normal exercise nuclear medicine study with normal perfusion. Notable ECG changes likely from LVH, with hypertensive response to exercise.     Echo complete w/ contrast if indicated    Result Date: 1/7/2024  Narrative:   Left Ventricle: Left ventricular cavity size is normal. Wall thickness is normal. The left ventricular ejection fraction is 65%. Systolic function is normal. Wall motion is normal. Diastolic function is normal.   Right Ventricle: Right ventricular cavity size is normal. Systolic function is normal.   No significant valvular abnormalities. No prior studies for comparison.    XR chest 2 views    Result Date: 1/6/2024  Narrative: CHEST INDICATION:   chest pain. COMPARISON: CXR 5/22/2015. EXAM PERFORMED/VIEWS:  XR CHEST PA & LATERAL. DUAL ENERGY SUBTRACTION. FINDINGS: Cardiomediastinal silhouette normal. No acute disease. Benign left apical scar. Stable benign calcification in the left midlung. No effusion or pneumothorax. Upper abdomen normal.  Bones normal for age.     Impression: No acute cardiopulmonary disease. Workstation performed: [id number]       Review of Systems:  Review of Systems   Constitutional: Negative.    HENT: Negative.     Eyes: Negative.    Respiratory: Negative.     Cardiovascular: Negative.    Endocrine: Negative.    Musculoskeletal: Negative.        Physical Exam:  /82 (BP Location: Right arm, Patient Position: Sitting, Cuff Size: Standard)   Pulse 68   Ht 5' 1\" (1.549 m)   Wt 60.2 kg (132 lb 11.2 oz)   LMP 11/05/2022 (Exact Date)   SpO2 98%   BMI 25.07 kg/m²   Physical Exam  Constitutional:       General: She is not in acute distress.     Appearance: She is not " "ill-appearing, toxic-appearing or diaphoretic.   HENT:      Nose: Nose normal. No congestion or rhinorrhea.   Neck:      Vascular: No carotid bruit.   Cardiovascular:      Pulses: Normal pulses.      Heart sounds: No murmur heard.     No friction rub. No gallop.   Pulmonary:      Effort: Pulmonary effort is normal. No respiratory distress.      Breath sounds: No stridor. No wheezing or rhonchi.   Abdominal:      General: Abdomen is flat. Bowel sounds are normal. There is no distension.      Palpations: There is no mass.      Tenderness: There is no abdominal tenderness.      Hernia: No hernia is present.   Musculoskeletal:         General: No swelling, tenderness, deformity or signs of injury. Normal range of motion.      Cervical back: Normal range of motion. No rigidity or tenderness.   Lymphadenopathy:      Cervical: No cervical adenopathy.   Skin:     General: Skin is warm.      Coloration: Skin is not jaundiced or pale.      Findings: No bruising or erythema.   Neurological:      Mental Status: She is alert.         This note was completed in part utilizing [employer] direct voice recognition software.   Grammatical errors, random word insertion, spelling mistakes, occasional wrong word or \"sound-alike\" substitutions and incomplete sentences may be an occasional consequence of the system secondary to software limitations, ambient noise and hardware issues. At the time of dictation, efforts were made to edit, clarify and /or correct errors.  Please read the chart carefully and recognize, using context, where substitutions have occurred.  If you have any questions or concerns about the context, text or information contained within the body of this dictation, please contact myself, the provider, for further clarification.  "

## 2024-01-12 ENCOUNTER — HOSPITAL ENCOUNTER (OUTPATIENT)
Dept: CT IMAGING | Facility: HOSPITAL | Age: 54
Discharge: HOME | End: 2024-01-12
Attending: INTERNAL MEDICINE
Payer: COMMERCIAL

## 2024-01-12 DIAGNOSIS — R94.39 ABNORMAL CARDIOVASCULAR STRESS TEST: ICD-10-CM

## 2024-01-12 PROCEDURE — 75571 CT HRT W/O DYE W/CA TEST: CPT

## 2024-01-12 PROCEDURE — G1004 CDSM NDSC: HCPCS

## 2024-01-17 LAB
CHEST PAIN STATEMENT: NORMAL
MAX DIASTOLIC BP: 78 MMHG
MAX PREDICTED HEART RATE: 166 BPM
PROTOCOL NAME: NORMAL
REASON FOR TERMINATION: NORMAL
STRESS POST EXERCISE DUR MIN: 7 MIN
STRESS POST EXERCISE DUR SEC: 0 SEC
STRESS POST PEAK HR: 153 BPM
STRESS POST PEAK SYSTOLIC BP: 198 MMHG
TARGET HR FORMULA: NORMAL

## 2024-01-19 ENCOUNTER — TELEPHONE (OUTPATIENT)
Dept: CARDIOLOGY CLINIC | Facility: CLINIC | Age: 54
End: 2024-01-19

## 2024-01-19 NOTE — TELEPHONE ENCOUNTER
----- Message -----  From: Marti Florentino MD  Sent: 1/15/2024   4:09 PM EST  To: [facility] [place] [facility]    Calcium score was 0,-this is very good and normal, she please let her know,   She also had some calcified granulomas in the lungs-probably chronic and nothing to worry about but she should discuss this with her primary care physician

## 2024-04-19 ENCOUNTER — TELEPHONE (OUTPATIENT)
Age: 54
End: 2024-04-19

## 2024-04-19 NOTE — TELEPHONE ENCOUNTER
Pt called stating that she plans to start doing some cardio vascular exercise and she is worried about doing this d/t results of her EKGs in the past, She said she is mostly concerned about inverted T waves and SVT prolongation. She mentioned this being a concern of  maker heart attack. She also states that she thought she was going for a CTA not a calcium scoring. She said that she thought the CTA would be more of an ultrasound of the valves.     Pt would like to know if she is okay to proceed with exercising and also if she can have a CTA ordered?    Per OV note: Return in about 6 months (around 7/11/2024) for  Dr. Anthony Balderrama or Dr. Elaine .    Would you like pt scheduled with one of those doctors or yourself?    Please advise

## 2024-04-22 ENCOUNTER — OFFICE VISIT (OUTPATIENT)
Dept: INTERNAL MEDICINE CLINIC | Facility: OTHER | Age: 54
End: 2024-04-22
Payer: COMMERCIAL

## 2024-04-22 VITALS
HEART RATE: 76 BPM | HEIGHT: 61 IN | WEIGHT: 135.4 LBS | SYSTOLIC BLOOD PRESSURE: 140 MMHG | DIASTOLIC BLOOD PRESSURE: 90 MMHG | OXYGEN SATURATION: 95 % | TEMPERATURE: 98.8 F | BODY MASS INDEX: 25.57 KG/M2

## 2024-04-22 DIAGNOSIS — R03.0 ELEVATED BP WITHOUT DIAGNOSIS OF HYPERTENSION: Primary | ICD-10-CM

## 2024-04-22 DIAGNOSIS — R22.41 MASS OF RIGHT THIGH: ICD-10-CM

## 2024-04-22 PROCEDURE — 99213 OFFICE O/P EST LOW 20 MIN: CPT

## 2024-04-22 NOTE — PROGRESS NOTES
Assessment/Plan:    1. Elevated BP without diagnosis of hypertension  Assessment & Plan:  BP elevated at 158/70, repeat 140/90 in office today  Discussed starting low-dose blood pressure medication with patient.  Patient would like to monitor blood pressure on her own at this time  Discussed healthy lifestyle modifications including decreasing salt in diet.  Exercise as tolerated  Monitor BP at home record 1-2 times per day for next month  Follow-up in office in 1 month to review, or sooner if blood pressure remains persistently elevated    Orders:  -     Comprehensive metabolic panel; Future  -     CBC and differential; Future  -     TSH, 3rd generation with Free T4 reflex; Future    2. Mass of right thigh  Comments:  Likely hematoma, will order u/s for evaluation due to duration of symptoms  Advised warm compresses, light massage to the area  Orders:  -     US extremity soft tissue; Future; Expected date: 04/22/2024              [employer] software was used to dictate this note.  It may contain errors with dictating incorrect words or incorrect spelling. Please contact the provider directly with any questions.    Subjective:      Patient ID: Floridalma Davison is a 53 y.o. female.    Patient is a 53-year-old female presenting to the office for BP concerns  Patient states that she was at urgent care 2 days ago and was advised that her blood pressure was 155/80  /70 today initially  Patient does not take blood pressure medication, but is seeing cardiology for abnormal stress test  Notes that she does take her blood pressure at home, BP at home 130-160s systolic  She recently had a cold and was taking over-the-counter decongestants, however denies taking pseudoephedrine products    Tobacco: none  Alcohol: occasionally  Drugs: none     Right thigh pain  Patient states that 3 weeks ago she was renovating her house and fell through the floor, floor stopped her and hit her right thigh  Noted that she had a little bit of  bruising at the time of the injury  Still having lump to right thigh, has not grown or decreased in size    Hypertension  This is a new problem. The current episode started more than 1 month ago. The problem is unchanged. The problem is controlled. Pertinent negatives include no anxiety, blurred vision, chest pain, headaches, orthopnea, palpitations, peripheral edema, PND or shortness of breath.         The following portions of the patient's history were reviewed and updated as appropriate: allergies, current medications, past family history, past medical history, past social history, past surgical history, and problem list.    Review of Systems   Constitutional:  Negative for chills, fatigue and fever.   Eyes:  Negative for blurred vision, pain and visual disturbance.   Respiratory:  Negative for cough, chest tightness, shortness of breath and wheezing.    Cardiovascular:  Negative for chest pain, palpitations, orthopnea, leg swelling and PND.   Gastrointestinal:  Negative for abdominal pain, nausea and vomiting.   Neurological:  Negative for dizziness, weakness, light-headedness and headaches.         Past Medical History:   Diagnosis Date    Abnormal Pap smear of cervix     6 yrs ago    Female infertility     Miscarriage     , 2016    Recurrent pregnancy loss, antepartum condition or complication     x 2 2015,-D & C; 2016-chemical pregnancy    Thalassemia     carrier    Urinary tract infection     last episode 2019    Varicella     childhood chickenpox       No current outpatient medications on file.    Allergies   Allergen Reactions    Dicloxacillin Rash       Social History   Past Surgical History:   Procedure Laterality Date    DILATION AND CURETTAGE OF UTERUS      EYE SURGERY      [place]  DELIVERY ONLY N/A 2020    Procedure:  SECTION ();  Surgeon: Juliana Cuenca MD;  Location: [facility] ;  Service: Obstetrics    TOOTH EXTRACTION  2022    Molar removed    WISDOM TOOTH  "EXTRACTION       Family History   Problem Relation Age of Onset    Hypertension Mother     Diabetes Mother     Cancer Mother     No Known Problems Father     Heart failure Maternal Grandmother     Arthritis Maternal Grandmother     Cancer Maternal Grandfather     Diabetes Maternal Grandfather     No Known Problems Paternal Grandmother     No Known Problems Paternal Grandfather     Hypertension Brother        Objective:  /90 (BP Location: Right arm, Patient Position: Sitting, Cuff Size: Standard)   Pulse 76   Temp 98.8 °F (37.1 °C) (Temporal)   Ht 5' 1\" (1.549 m)   Wt 61.4 kg (135 lb 6.4 oz) Comment: with shoes on  LMP 11/05/2022 (Exact Date)   SpO2 95% Comment: ra  BMI 25.58 kg/m²      Physical Exam  Vitals and nursing note reviewed.   Constitutional:       General: She is not in acute distress.     Appearance: Normal appearance. She is not ill-appearing.   HENT:      Head: Normocephalic and atraumatic.      Right Ear: External ear normal.      Left Ear: External ear normal.      Mouth/Throat:      Mouth: Mucous membranes are moist.      Pharynx: Oropharynx is clear.   Eyes:      General: No scleral icterus.        Right eye: No discharge.         Left eye: No discharge.      Extraocular Movements: Extraocular movements intact.      Right eye: No nystagmus.      Left eye: No nystagmus.      Conjunctiva/sclera: Conjunctivae normal.      Pupils: Pupils are equal, round, and reactive to light.   Cardiovascular:      Rate and Rhythm: Normal rate and regular rhythm.      Heart sounds: Normal heart sounds. No murmur heard.     No friction rub. No gallop.   Pulmonary:      Effort: Pulmonary effort is normal. No respiratory distress.      Breath sounds: Normal breath sounds. No wheezing, rhonchi or rales.   Chest:      Chest wall: No tenderness.   Musculoskeletal:      Right lower leg: No edema.      Left lower leg: No edema.   Skin:     General: Skin is warm and dry.      Coloration: Skin is not pale.      " Findings: No erythema.      Comments: Approximately fist sized mass under skin of right thigh  Nontender to palpation  No skin changes including bruising or erythema noted   Neurological:      General: No focal deficit present.      Mental Status: She is alert and oriented to person, place, and time.      Cranial Nerves: No cranial nerve deficit.      Sensory: No sensory deficit.      Motor: No weakness.   Psychiatric:         Mood and Affect: Mood normal.         Behavior: Behavior normal.           Disclaimer: This note was generated with voice recognition software.  Phonetic, grammatical, and spelling errors may be present as a result.  Please contact provider with any concerns or questions

## 2024-04-22 NOTE — ASSESSMENT & PLAN NOTE
BP elevated at 158/70, repeat 140/90 in office today  Discussed starting low-dose blood pressure medication with patient.  Patient would like to monitor blood pressure on her own at this time  Discussed healthy lifestyle modifications including decreasing salt in diet.  Exercise as tolerated  Monitor BP at home record 1-2 times per day for next month  Follow-up in office in 1 month to review, or sooner if blood pressure remains persistently elevated

## 2024-04-24 ENCOUNTER — TELEPHONE (OUTPATIENT)
Age: 54
End: 2024-04-24

## 2024-04-24 DIAGNOSIS — R07.9 CHEST PAIN, UNSPECIFIED TYPE: Primary | ICD-10-CM

## 2024-04-24 DIAGNOSIS — I10 HYPERTENSIVE RESPONSE TO EXERCISE: ICD-10-CM

## 2024-04-24 RX ORDER — LOSARTAN POTASSIUM 25 MG/1
25 TABLET ORAL DAILY
Qty: 90 TABLET | Refills: 3 | Status: SHIPPED | OUTPATIENT
Start: 2024-04-24

## 2024-04-24 NOTE — PROGRESS NOTES
Patient is concerns about beginning an exercise program due to ECG changes although these were noted on prior stress tests as well, most recent exercise nuclear perfusion study as well which was negative overall.  Calcium score was also 0 which also puts her in a lower risk category  Would recommend starting losartan 25 mg daily for blood pressure control-considering hypertensive response to exercise    At patient's request, CTA is being ordered -if she decides to proceed with a CTA, she will take metoprolol titrate 25 mg the night before and 25 mg the morning of the procedure to facilitate a good study  If she schedules the test and decides to proceed, then metoprolol can be prescribed

## 2024-04-24 NOTE — TELEPHONE ENCOUNTER
3rd attempt    Left pt a vm to contact our office.     Called pt's emergency contact, spoke with pt's spouse. Advised him we have been trying to reach pt. Per spouse pt is currently on a business trip in [place]. Spouse will relay the message for pt to contact our office.

## 2024-04-24 NOTE — TELEPHONE ENCOUNTER
Spoke with pt's spouse, advised him of Dr. Florentino's message. Per spouse he will relay the message to pt. Provided the central scheduling dept number to spouse. Advised him once the testing is scheduled the team will be able to obtain an auth and if it's not covered, patient would be cancelled and referred to .

## 2024-04-24 NOTE — TELEPHONE ENCOUNTER
Caller: Parker Herrmann     Doctor: Dr. Florentino     Reason for call: patient's  called and advised that his wife is away on business and has been receiving calls from our office about a letter to see if she is able to continue exercising. She had already reached out to her PCP who got the ok from the cardiologist and provided her the note she needed to continue. She also told her  to ask if it was possible to place an order/ script to get a CTA test done? She is not sure if the office is able to place that for her and would like to know if the insurance could cover it and if does not, what are the out of pocket expenses. Please someone call  back w/ details.     Call back#: [phone]     Calcipotriene Counseling:  I discussed with the patient the risks of calcipotriene including but not limited to erythema, scaling, itching, and irritation.

## 2024-05-01 DIAGNOSIS — Z00.6 ENCOUNTER FOR EXAMINATION FOR NORMAL COMPARISON OR CONTROL IN CLINICAL RESEARCH PROGRAM: ICD-10-CM

## 2024-11-04 NOTE — NURSING NOTE
Cardiac CTA Questionairre      Cardiac history    Calcium Score:  Date 24       Score result 0     Reason for exam: chest pain       Have you had a CABG, angioplasty, cardiac cath, stent placement? NO     Do you have a pacemaker or defibrillator? NO     Have you ever been diagnosed with an irregular heart beat? NO     Do you have a history of having COPD or asthma?   NO    Do you have high blood pressure? NO     Do you have diabetes? NO     Did you ever smoke? NO    Do you currently smoke?                                 Quit              Do you have allergies? see Chart  Allergy to intravenous contrast or dye?  NO     Do you have kidney disease?  NO  Fluid restrictions: NO                               Blood work done: NA            GFR:        Call placed to patient to discuss upcoming appointment at [facility] radiology department and complete consultation and Questionnaire with patient via phone. Patient is having a Cardiac CTA. Reviewed patient's allergies, also reviewed medications.  Beta blocker requested to be ordered by Cardiologist and patient aware to take per MD instructions the evening prior and  1 1/2  hour prior to scan time. Test instructions given, patient aware to hold all caffeine products for 12 hours prior (2300)to the exam this includes coffee, decaf, tea, soda and chocolate.  Also made aware to avoid solid food for 3 hours prior (0800) to exam and to increase fluids one day prior to exam unless contraindicated.  Patient was instructed that they may take all medications as usual unless otherwise directed by physician. Patient instructed to not use inhalers prior to exam, if uses may need to reschedule the study to another day. Discussed the pre procedure expectations, post procedure expectations, time entailed to perform the study and also the medications that may be used in exam (beta blocker if needed to  heart rate to under 65 beats per minute for optimal exam results and NTG  given during scan for vasodilation). Reminded patient of location and time expected for procedure, instructed to bring photo ID, insurance card and script. Recommendation for  to and from study given to patient.  Informed the patient that the study will last approximately 30-45 minutes. Pt verbalizes understanding of education and instructions. My number was also supplied to patient to call if any further questions or concerns should arise pre or post procedure.   Instructions also sent via e-mail to verified address and via epic my chart see message below.    Upcoming Radiology appointment at [facility]  Good afternoon Mrs. Davison,     Brain are scheduled on 11/11/24 @ 11 am for a CTA Cardiac in the Radiology department of the [facility].    [facility] is located at [address] [place] [address], building [address] 1st floor and present to Radiology 15 minutes prior to your appointment time.     It is recommended that you come to your appointment with a  for your safety.    Please avoid all caffeine products for 12 hours prior to the exam this includes coffee, decaf, tea, soda, or chocolate. This will be in effect from 11/10/24 at 11 pm till your appointment time.    You are also to stop solid food 3 hours prior to the exam at 8 am but you can continue to drink  clear fluids (excluding coffee, decaf, tea, soda, or chocolate) prior to the exam.     If not on fluid restriction, please increase fluids starting the day prior. Also please drink 40 oz of water (5 cups) of water during the morning of your scan.     You can take all your regular prescribed medication as usual unless otherwise directed by your physician.     Please do not use any inhaler medication prior to the exam, if used you may need to reschedule your test.     As of 11/4/24 I  have requested for medication to lower your heart rate to be ordered and sent to your pharmacy. If you don't hear from your ordering  doctor's office or pharmacy please call the ordering MD and ask if they are ordering such medications. If I should hear back from the doctor I will communicate to you also. If your ordering provider should order Metoprolol to be taken, please take at 9:30 pm on 11/10 and at 9:30 am  1 ½ hour prior to the scan.    If anti-anxiety medication is to be taken, please take 1 hour prior to the scan. You will also need have a  to and from the study if such medication is taken.    During the study a certain medication may be given such as a beta blocker if needed to  heart rate to under 65 beats per minute for optimal exam results.  Two sublingual tablets of nitroglycerin will be given during scan for vasodilation, prior to contrast administration and final scan.     If the heart rate is not able to achieve target (55-60 beats per minute) study most likely will be cancelled after discussing with Radiologist. At a higher heart rate study may not be useful or viable.     The study can last approximately 30-45 minutes.     If you have any question or concerns, please feel free to contact me at the number below,   Fidelia Cazares RN  [facility] Radiology RN  [address] [place] [address]  [phone] (Office)  [phone] (Fax)  [email or web address]

## 2024-11-05 ENCOUNTER — HOSPITAL ENCOUNTER (OUTPATIENT)
Dept: RADIOLOGY | Facility: HOSPITAL | Age: 54
Discharge: HOME | End: 2024-11-05
Payer: COMMERCIAL

## 2024-11-05 DIAGNOSIS — R22.41 MASS OF RIGHT THIGH: ICD-10-CM

## 2024-11-05 DIAGNOSIS — R94.39 ABNORMAL CARDIOVASCULAR STRESS TEST: Primary | ICD-10-CM

## 2024-11-05 PROCEDURE — 76882 US LMTD JT/FCL EVL NVASC XTR: CPT

## 2024-11-05 RX ORDER — METOPROLOL TARTRATE 100 MG/1
100 TABLET ORAL SEE ADMIN INSTRUCTIONS
Qty: 1 TABLET | Refills: 1 | Status: SHIPPED | OUTPATIENT
Start: 2024-11-05

## 2024-11-05 NOTE — NURSING NOTE
Received in basket Epic message from ordering provider Dr. Florentino he has ordered to the patient's pharmacy 100 mg of Metoprolol to be taken 90 minutes prior to the scan. Called patient and informed her of this, she will be taking the medication on 11/11 @ 0930.

## 2024-11-11 ENCOUNTER — HOSPITAL ENCOUNTER (OUTPATIENT)
Dept: RADIOLOGY | Facility: HOSPITAL | Age: 54
Discharge: HOME | End: 2024-11-11
Attending: INTERNAL MEDICINE

## 2024-11-11 NOTE — NURSING NOTE
Patient needed to reschedule due to insurance authorization,  sent updated instructions and directions in e-mail that is linked to this account and via epic my chart. See message below.  Upcoming Radiology appointment at [facility]  Good morning Mrs. Davison,      You are rescheduled on 24 @ 10  am for a CTA Cardiac in the Radiology department of the [facility].    [facility] is located at [address] [place] [address], [address] 1st floor and present to Radiology 15 minutes prior to your appointment time.      It is recommended that you come to your appointment with a  for your safety.     Please avoid all caffeine products for 12 hours prior to the exam this includes coffee, decaf, tea, soda, or chocolate. This will be in effect from 24 at 10 pm till your appointment time.     You are also to stop solid food 3 hours prior to the exam at 7 am but you can continue to drink  clear fluids (excluding coffee, decaf, tea, soda, or chocolate) prior to the exam.      If not on fluid restriction, please increase fluids starting the day prior. Also please drink 40 oz of water (5 cups) of water during the morning of your scan.      You can take all your regular prescribed medication as usual unless otherwise directed by your physician.      Please do not use any inhaler medication prior to the exam, if used you may need to reschedule your test.      Your ordering provider has instructed for Metoprolol 100 mg to be taken, please take 1 ½ hour prior to the scan 8:30 am.    If anti-anxiety medication is to be taken, please take 1 hour prior to the scan. You will also need have a  to and from the study if such medication is taken.     During the study a certain medication may be given such as a beta blocker if needed to  heart rate to under 65 beats per minute for optimal exam results.  Two sublingual tablets of nitroglycerin will be given during scan for  vasodilation, prior to contrast administration and final scan.      If the heart rate is not able to achieve target (55-60 beats per minute) study most likely will be cancelled after discussing with Radiologist. At a higher heart rate study may not be useful or viable.      The study can last approximately 30-45 minutes.      If you have any question or concerns, please feel free to contact me at the number below,   Fidelia Cazares RN  [facility] Radiology RN  [address] [place] [id number]  [phone] (Office)  [phone] (Fax)  [email or web address]

## 2024-11-18 ENCOUNTER — HOSPITAL ENCOUNTER (OUTPATIENT)
Dept: RADIOLOGY | Facility: HOSPITAL | Age: 54
Discharge: HOME | End: 2024-11-18
Attending: INTERNAL MEDICINE
Payer: COMMERCIAL

## 2024-11-18 VITALS
SYSTOLIC BLOOD PRESSURE: 127 MMHG | RESPIRATION RATE: 16 BRPM | HEART RATE: 51 BPM | OXYGEN SATURATION: 96 % | DIASTOLIC BLOOD PRESSURE: 76 MMHG

## 2024-11-18 DIAGNOSIS — R07.9 CHEST PAIN, UNSPECIFIED TYPE: ICD-10-CM

## 2024-11-18 PROCEDURE — 75574 CT ANGIO HRT W/3D IMAGE: CPT

## 2024-11-18 RX ORDER — NITROGLYCERIN 0.4 MG/1
0.8 TABLET SUBLINGUAL
Status: DISCONTINUED | OUTPATIENT
Start: 2024-11-18 | End: 2024-11-19 | Stop reason: HOSPADM

## 2024-11-18 RX ADMIN — IOHEXOL 68 ML: 350 INJECTION, SOLUTION INTRAVENOUS at 12:11

## 2024-11-18 RX ADMIN — NITROGLYCERIN 0.8 MG: 0.4 TABLET SUBLINGUAL at 10:25

## 2024-11-19 ENCOUNTER — RESULTS FOLLOW-UP (OUTPATIENT)
Dept: NON INVASIVE DIAGNOSTICS | Facility: HOSPITAL | Age: 54
End: 2024-11-19

## 2024-11-22 NOTE — TELEPHONE ENCOUNTER
Spoke with pt, advised pt of normal testing per Dr. Florentino's message.      Pt would like to come in for an appt to discuss bp medication and diagnosis.      Scheduling dept.  Please contact pt to arrange a f/u visit with Dr. Florentino. Thank you.

## 2024-11-22 NOTE — TELEPHONE ENCOUNTER
----- Message from Jacey ANDERSON sent at 11/19/2024  3:19 PM EST -----    ----- Message -----  From: Marti Florentino MD  Sent: 11/19/2024   3:07 PM EST  To: Cardiology [facility]    Results are normal. Please notify pt.

## 2024-11-22 NOTE — RESULT ENCOUNTER NOTE
Spoke with pt, advised pt of Dr. Florentino's message. Pt has not been taking it for some time. Pt will send us a log through [employer].

## 2025-02-03 ENCOUNTER — ANNUAL EXAM (OUTPATIENT)
Age: 55
End: 2025-02-03
Payer: COMMERCIAL

## 2025-02-03 VITALS
HEIGHT: 61 IN | SYSTOLIC BLOOD PRESSURE: 132 MMHG | BODY MASS INDEX: 24.58 KG/M2 | WEIGHT: 130.2 LBS | DIASTOLIC BLOOD PRESSURE: 88 MMHG

## 2025-02-03 DIAGNOSIS — Z11.3 SCREENING FOR STD (SEXUALLY TRANSMITTED DISEASE): ICD-10-CM

## 2025-02-03 DIAGNOSIS — Z01.419 ENCOUNTER FOR ANNUAL ROUTINE GYNECOLOGICAL EXAMINATION: Primary | ICD-10-CM

## 2025-02-03 DIAGNOSIS — Z12.31 ENCOUNTER FOR SCREENING MAMMOGRAM FOR MALIGNANT NEOPLASM OF BREAST: ICD-10-CM

## 2025-02-03 DIAGNOSIS — N95.1 MENOPAUSAL SYMPTOMS: ICD-10-CM

## 2025-02-03 PROCEDURE — 99396 PREV VISIT EST AGE 40-64: CPT | Performed by: OBSTETRICS & GYNECOLOGY

## 2025-02-03 RX ORDER — PROGESTERONE 200 MG/1
1 CAPSULE ORAL
Qty: 90 CAPSULE | Refills: 3 | Status: SHIPPED | OUTPATIENT
Start: 2025-02-03 | End: 2026-02-03

## 2025-02-03 RX ORDER — ESTRADIOL 0.07 MG/D
1 FILM, EXTENDED RELEASE TRANSDERMAL 2 TIMES WEEKLY
Qty: 24 PATCH | Refills: 3 | Status: SHIPPED | OUTPATIENT
Start: 2025-02-03 | End: 2026-02-03

## 2025-02-03 NOTE — PROGRESS NOTES
Assessment/Plan:      Encounter for annual routine gynecological examination    Encounter for screening mammogram for malignant neoplasm of breast  -     Mammo screening bilateral w 3d and cad; Future    Screening for STD (sexually transmitted disease)  -     Chlamydia/GC JOEY, Confirmation    Menopausal symptoms  -     estradiol (Vivelle-Dot) 0.075 MG/24HR; Place 1 patch on the skin 2 (two) times a week  -     Progesterone 200 MG CAPS; Take 1 capsule by mouth daily at bedtime       F/U 3 months      Subjective      Floridalma Davison is a 54 y.o. female who presents for annual exam. The patient c/o hot flashes, night sweats, brain fog and sleep disrupted. The patient is sexually active.  The patient is not taking hormone replacement therapy. Patient denies post-menopausal vaginal bleeding.  She denies any breast or urinary concerns.     Menstrual History:  OB History          2    Para   1    Term   1       0    AB   1    Living   1         SAB   0    IAB   0    Ectopic   0    Multiple   0    Live Births   1                Patient's last menstrual period was 2022 (exact date).     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 pregnancy    Thalassemia     carrier    Urinary tract infection     last episode     Varicella     childhood chickenpox       Family History   Problem Relation Age of Onset    Hypertension Mother     Diabetes Mother     Cancer Mother     No Known Problems Father     Heart failure Maternal Grandmother     Arthritis Maternal Grandmother     Heart disease Maternal Grandmother     Cancer Maternal Grandfather     Diabetes Maternal Grandfather     No Known Problems Paternal Grandmother     No Known Problems Paternal Grandfather      "Hypertension Brother        The following portions of the patient's history were reviewed and updated as appropriate: allergies, current medications, past family history, past medical history, past social history, past surgical history, and problem list.    Review of Systems  Pertinent items are noted in HPI.     Objective      /88 (BP Location: Left arm, Patient Position: Sitting, Cuff Size: Large)   Ht 5' 0.75\" (1.543 m)   Wt 59.1 kg (130 lb 3.2 oz)   LMP 11/05/2022 (Exact Date)   Breastfeeding No   BMI 24.80 kg/m²     General:   alert and oriented, in no acute distress   Heart:  Breasts: regular rate and rhythm  appear normal, no suspicious masses, no skin or nipple changes or axillary nodes.   Lungs: Effort normal   Abdomen: soft, non-tender, without masses or organomegaly   Vulva: normal   Vagina: normal mucosa   Cervix: no lesions   Uterus: normal size, mobile, non-tender   Adnexa:  Bladder: normal adnexa and no mass, fullness, tenderness  Non-tender       "

## 2025-02-04 ENCOUNTER — TELEPHONE (OUTPATIENT)
Dept: INTERNAL MEDICINE CLINIC | Facility: OTHER | Age: 55
End: 2025-02-04

## 2025-02-07 LAB
C TRACH RRNA SPEC QL NAA+PROBE: NEGATIVE
N GONORRHOEA RRNA SPEC QL NAA+PROBE: NEGATIVE

## 2025-02-10 ENCOUNTER — RESULTS FOLLOW-UP (OUTPATIENT)
Age: 55
End: 2025-02-10

## 2025-03-12 ENCOUNTER — NURSE TRIAGE (OUTPATIENT)
Age: 55
End: 2025-03-12

## 2025-03-12 NOTE — TELEPHONE ENCOUNTER
"FOLLOW UP: Discuss with provider and call pt back.     REASON FOR CONVERSATION: Vaginal Bleeding    SYMPTOMS: Vaginal spotting.    OTHER: Missed 1 week of Estradiol and 1 day of progesterone.  Restarted on Monday but has had spotting since Sunday.    DISPOSITION: See Within 2 Weeks in Office    Reason for Disposition   Postmenopausal vaginal bleeding    Answer Assessment - Initial Assessment Questions  1. BLEEDING SEVERITY: \"Describe the bleeding that you are having.\" \"How much bleeding is there?\"       Spotting when wiping with dime size clots.  2. ONSET: \"When did the bleeding begin?\" \"Is it continuing now?\"      Sunday  3. MENOPAUSE: \"When was your last menstrual period?\"       Over a year  4. ABDOMEN PAIN: \"Do you have any pain?\" \"How bad is the pain?\"  (e.g., Scale 0-10; none, mild, moderate, or severe)      denies  5. BLOOD THINNERS: \"Do you take any blood thinners?\" (e.g., Coumadin/warfarin, Pradaxa/dabigatran, aspirin)      denies  6. HORMONE MEDICINES: \"Are you taking any hormone medicines, prescription or OTC?\" (e.g., birth control pills, estrogen)      Estradiol and progesterone  7. CAUSE: \"What do you think is causing the bleeding?\" (e.g., recent gyn surgery, recent gyn procedure; known bleeding disorder, uterine cancer)        Missing HRT medication.  8. HEMODYNAMIC STATUS: \"Are you weak or feeling lightheaded?\" If Yes, ask: \"Can you stand and walk normally?\"        denies  9. OTHER SYMPTOMS: \"What other symptoms are you having with the bleeding?\" (e.g., back pain, burning with urination, fever)      denies    Protocols used: Vaginal Bleeding - Postmenopausal-Adult-[place]    "

## 2025-03-13 NOTE — TELEPHONE ENCOUNTER
Spoke with patient to review message per Dr. Matos. Patient verbalizes understanding. Reviewed bleeding precautions as well.   none

## 2025-03-14 ENCOUNTER — TELEPHONE (OUTPATIENT)
Age: 55
End: 2025-03-14

## 2025-03-31 ENCOUNTER — TELEPHONE (OUTPATIENT)
Dept: INTERNAL MEDICINE CLINIC | Age: 55
End: 2025-03-31

## 2025-04-25 ENCOUNTER — RA CDI HCC (OUTPATIENT)
Dept: OTHER | Facility: HOSPITAL | Age: 55
End: 2025-04-25

## 2025-04-25 NOTE — PROGRESS NOTES
HCC coding opportunities       Chart reviewed, no opportunity found: CHART REVIEWED, NO OPPORTUNITY FOUND        Patients Insurance        Commercial Insurance: [employer]

## 2025-05-02 ENCOUNTER — OFFICE VISIT (OUTPATIENT)
Dept: INTERNAL MEDICINE CLINIC | Facility: OTHER | Age: 55
End: 2025-05-02
Payer: COMMERCIAL

## 2025-05-02 VITALS
HEIGHT: 60 IN | TEMPERATURE: 97.5 F | HEART RATE: 58 BPM | WEIGHT: 134 LBS | SYSTOLIC BLOOD PRESSURE: 124 MMHG | OXYGEN SATURATION: 100 % | BODY MASS INDEX: 26.31 KG/M2 | DIASTOLIC BLOOD PRESSURE: 76 MMHG

## 2025-05-02 DIAGNOSIS — R03.0 ELEVATED BP WITHOUT DIAGNOSIS OF HYPERTENSION: ICD-10-CM

## 2025-05-02 DIAGNOSIS — Z13.0 SCREENING FOR DEFICIENCY ANEMIA: ICD-10-CM

## 2025-05-02 DIAGNOSIS — Z13.1 SCREENING FOR DIABETES MELLITUS: ICD-10-CM

## 2025-05-02 DIAGNOSIS — M54.50 ACUTE RIGHT-SIDED LOW BACK PAIN WITHOUT SCIATICA: Primary | ICD-10-CM

## 2025-05-02 DIAGNOSIS — Z13.220 SCREENING FOR LIPID DISORDERS: ICD-10-CM

## 2025-05-02 PROBLEM — D68.2 HEREDITARY DEFICIENCY OF OTHER CLOTTING FACTORS (HCC): Status: ACTIVE | Noted: 2025-05-02

## 2025-05-02 PROBLEM — D56.0 ALPHA THALASSEMIA (HCC): Status: ACTIVE | Noted: 2025-05-02

## 2025-05-02 PROBLEM — Z00.00 ANNUAL PHYSICAL EXAM: Status: RESOLVED | Noted: 2021-09-15 | Resolved: 2025-05-02

## 2025-05-02 PROCEDURE — 99213 OFFICE O/P EST LOW 20 MIN: CPT | Performed by: NURSE PRACTITIONER

## 2025-05-02 RX ORDER — METHOCARBAMOL 500 MG/1
500 TABLET, FILM COATED ORAL
Qty: 30 TABLET | Refills: 0 | Status: SHIPPED | OUTPATIENT
Start: 2025-05-02

## 2025-05-02 RX ORDER — MELOXICAM 15 MG/1
15 TABLET ORAL DAILY
Qty: 30 TABLET | Refills: 0 | Status: SHIPPED | OUTPATIENT
Start: 2025-05-02

## 2025-05-02 RX ORDER — LIDOCAINE 50 MG/G
1 PATCH TOPICAL DAILY
Qty: 10 PATCH | Refills: 1 | Status: SHIPPED | OUTPATIENT
Start: 2025-05-02

## 2025-05-02 NOTE — PROGRESS NOTES
Name: Floridalma Davison      : 1970      MRN: [id number]  Encounter Provider: SCAR Sarah  Encounter Date: 2025   Encounter department: [facility]  :  Assessment & Plan  Acute right-sided low back pain without sciatica  Start mobic 15mg daily  Start robaxin 500mg qhs , advised to not drive on this medication  Lidocaine patch to affected area  Can use heat, advised to not combine heat with patch  Get xray of lumbar spine  Start physical therapy  Follow up after therapy   Orders:    XR spine lumbar minimum 4 views non injury; Future    meloxicam (Mobic) 15 mg tablet; Take 1 tablet (15 mg total) by mouth daily    methocarbamol (ROBAXIN) 500 mg tablet; Take 1 tablet (500 mg total) by mouth daily at bedtime as needed for muscle spasms    lidocaine (Lidoderm) 5 %; Apply 1 patch topically over 12 hours daily Remove & Discard patch within 12 hours or as directed by MD    Ambulatory Referral to Physical Therapy; Future    Screening for lipid disorders    Orders:    Lipid Panel with Direct LDL reflex; Future    Screening for deficiency anemia    Orders:    CBC and differential; Future    Screening for diabetes mellitus    Orders:    Comprehensive metabolic panel; Future    Elevated BP without diagnosis of hypertension  Previously started on losartan 25mg daily by cardiology for her hyptensive response to exercise  - she never started the medication  - advised to start monitoring BP at home  - bring log with to follow up  - follow up with cardiology as scheduled in July             History of Present Illness   HPI    Patient presents today with right low back pain into her right hip, occasionally radiating down her right leg. Symptoms started 1-2 months. She denies any known injury. She states her symptoms wax and wane but it does consistently affect her mobility. She has trouble bending forward to put her shoes on.   She is taking aleve consistently for the last  week without any significant improvement.   She feels CoQ10 is helping.   She has tried icing it yesterday with temporary relief.  She saw a chiropractor which helped temporarily.     She is currently following with cardiology. They started her on losartan 25mg daily due to her hypertensive response to exercise.   She states she never started this medication.  She has a following up with cardiology again in July.   She was monitoring her BP at home but states it is not working now.  She states sometimes her readings were high and sometimes they were normal.           Review of Systems   Constitutional:  Negative for activity change and appetite change.   Musculoskeletal:  Positive for back pain.   Neurological:  Negative for weakness and numbness.       Objective   /76 (BP Location: Left arm, Patient Position: Sitting, Cuff Size: Adult)   Pulse 58   Temp 97.5 °F (36.4 °C) (Temporal)   Ht 5' (1.524 m)   Wt 60.8 kg (134 lb)   LMP 11/05/2022 (Exact Date)   SpO2 100%   BMI 26.17 kg/m²      Physical Exam  Vitals reviewed.   Constitutional:       General: She is not in acute distress.     Appearance: Normal appearance. She is normal weight. She is not diaphoretic.   HENT:      Head: Normocephalic and atraumatic.   Eyes:      Extraocular Movements: Extraocular movements intact.      Conjunctiva/sclera: Conjunctivae normal.      Pupils: Pupils are equal, round, and reactive to light.   Cardiovascular:      Rate and Rhythm: Normal rate and regular rhythm.      Heart sounds: Normal heart sounds. No murmur heard.  Pulmonary:      Effort: Pulmonary effort is normal. No respiratory distress.      Breath sounds: Normal breath sounds. No wheezing, rhonchi or rales.   Musculoskeletal:      Lumbar back: Tenderness (right lower back) and bony tenderness present. Decreased range of motion (flexion). Negative right straight leg raise test and negative left straight leg raise test.   Neurological:      Mental Status: She is  alert and oriented to person, place, and time. Mental status is at baseline.      Gait: Gait normal.      Deep Tendon Reflexes:      Reflex Scores:       Patellar reflexes are 2+ on the right side and 2+ on the left side.       Achilles reflexes are 1+ on the right side and 1+ on the left side.  Psychiatric:         Mood and Affect: Mood normal.         Behavior: Behavior normal.         Thought Content: Thought content normal.         Judgment: Judgment normal.

## 2025-05-02 NOTE — ASSESSMENT & PLAN NOTE
Previously started on losartan 25mg daily by cardiology for her hyptensive response to exercise  - she never started the medication  - advised to start monitoring BP at home  - bring log with to follow up  - follow up with cardiology as scheduled in July

## 2025-05-05 ENCOUNTER — OFFICE VISIT (OUTPATIENT)
Age: 55
End: 2025-05-05
Payer: COMMERCIAL

## 2025-05-05 VITALS
SYSTOLIC BLOOD PRESSURE: 122 MMHG | HEIGHT: 61 IN | BODY MASS INDEX: 25.83 KG/M2 | WEIGHT: 136.8 LBS | DIASTOLIC BLOOD PRESSURE: 74 MMHG

## 2025-05-05 DIAGNOSIS — Z79.890 HORMONE REPLACEMENT THERAPY (HRT): Primary | ICD-10-CM

## 2025-05-05 PROCEDURE — 99213 OFFICE O/P EST LOW 20 MIN: CPT | Performed by: OBSTETRICS & GYNECOLOGY

## 2025-05-05 NOTE — PROGRESS NOTES
GYN Follow Up Visit    HPI:  Patient is 3 months s/p HRT.  She denies any VB.  No headache.  All symptoms resolved.      PMH, PSH, Meds, Allergies, SocHx, FamHx reviewed and no changes noted.    ROS:  pertinent findings in HPI    Vitals:    05/05/25 1006   BP: 122/74       Physical Exam  Constitutional:       Appearance: Normal appearance.   Cardiovascular:      Rate and Rhythm: Normal rate and regular rhythm.   Pulmonary:      Effort: Pulmonary effort is normal.   Abdominal:      General: There is no distension.   Musculoskeletal:         General: No swelling.   Neurological:      General: No focal deficit present.      Mental Status: She is alert and oriented to person, place, and time.   Skin:     General: Skin is warm and dry.   Psychiatric:         Mood and Affect: Mood normal.         Behavior: Behavior normal.   Vitals reviewed.      Impression/Plan:    Hormone replacement therapy (HRT) (Primary)  - doing well  - blood pressure normal  - follow up when due for annual

## 2025-05-07 ENCOUNTER — EVALUATION (OUTPATIENT)
Dept: PHYSICAL THERAPY | Facility: OTHER | Age: 55
End: 2025-05-07
Payer: COMMERCIAL

## 2025-05-07 DIAGNOSIS — M54.50 ACUTE RIGHT-SIDED LOW BACK PAIN WITHOUT SCIATICA: Primary | ICD-10-CM

## 2025-05-07 PROCEDURE — 97161 PT EVAL LOW COMPLEX 20 MIN: CPT

## 2025-05-07 PROCEDURE — 97110 THERAPEUTIC EXERCISES: CPT

## 2025-05-07 NOTE — PROGRESS NOTES
PT Evaluation     Today's date: 2025  Patient name: Floridalma Davison  : 1970  MRN: [id number]  Referring provider: Karley Artis CRNP  Dx:   Encounter Diagnosis     ICD-10-CM    1. Acute right-sided low back pain without sciatica  M54.50 Ambulatory Referral to Physical Therapy                     Assessment  Impairments: abnormal gait, abnormal or restricted ROM, activity intolerance, impaired balance, impaired physical strength, lacks appropriate home exercise program, pain with function, participation limitations, activity limitations and endurance    Assessment details: Floridalma Davison is a 54 y.o. female  who presents to PT for an initial evaluation on 25 for low back pain. The patient's impairments include pain, decrease lumbar ROM/mobility, and LE neural tension. These impairments are limiting the patient from bending forward, preventing the patient from being able to transfer from sitting to standing without pain and from participating in her desired workout routine, including running, walking, hiking, skiing, and swimming. Patient would benefit from skilled PT to address the stated impairments, in order for the patient to return to their prior level of function.        Goals  Short term goals:  1. Within 2 weeks, the pt will demonstrate an understanding in HEP.   2. Within 2-4 weeks, the patient's pain, at it's worst, will decrease by 2.   3 Within 2-4 weeks, the pt will be able to tolerate a 30 minute walk with minimal to no pain.     Long term goals:  1. Within 4-6 weeks, the pt will meet or exceed expected FOTO outcomes.   2. Within 8-12 weeks, the patient's lumbar AROM will be WNL.   3. Within 3-6 months, the pt will be able to perform her desired workout routine with no limitations.       Plan  Patient would benefit from: skilled physical therapy  Planned modality interventions: low level laser therapy, TENS, thermotherapy: hydrocollator packs, traction, unattended electrical  stimulation and cryotherapy    Planned therapy interventions: activity modification, ADL retraining, balance, balance/weight bearing training, body mechanics training, flexibility, stretching, functional ROM exercises, home exercise program, therapeutic exercise, therapeutic activities, strengthening, patient education, neuromuscular re-education, behavior modification, massage, manual therapy, joint mobilization, IASTM, nerve gliding, self care, patient/caregiver education and IADL retraining    Frequency: 2x week  Duration in weeks: 12        Subjective Evaluation    History of Present Illness  Mechanism of injury: Pt began having R sided LBP approximately 2 months ago, with the pain occasionally radiating into her R hip and into her anterior knee. Recently she also began having radiating pain into her L hip. Aggravating factors include bending forward, transitioning from sitting to standing, and transitioning to standing after lying supine. Her doctor prescribed mobic and robaxin, which has provided relief. She also saw a chiropractor which brought no to minimal relief. She exercising 3-4x/week, including walking, running, swimming, hiking, and skiing, but she has been unable to perform any of these activities.   Patient Goals  Patient goals for therapy: decreased edema, decreased pain, increased motion, improved balance, increased strength, independence with ADLs/IADLs and return to sport/leisure activities    Pain  Current pain ratin  At best pain ratin  At worst pain ratin          Objective     Concurrent Complaints  Negative for bladder dysfunction, bowel dysfunction and saddle (S4) numbness    Palpation   Left   Tenderness of the erector spinae and quadratus lumborum.     Right   Tenderness of the erector spinae and quadratus lumborum.     Neurological Testing     Sensation     Lumbar   Left   Intact: light touch    Right   Intact: light touch    Reflexes   Left   Patellar (L4): normal  (2+)  Achilles (S1): normal (2+)  Clonus sign: negative    Right   Patellar (L4): normal (2+)  Achilles (S1): normal (2+)  Clonus sign: negative    Active Range of Motion     Lumbar   Flexion:  with pain Restriction level: moderate  Extension:  with pain Restriction level: minimal  Left lateral flexion:  WFL  Right lateral flexion:  WFL  Left rotation:  WFL  Right rotation:  WFL    Joint Play     Pain: T11, T12, L1, L2, L3, L4, L5 and S1   Mechanical Assessment    Cervical      Thoracic      Lumbar    Lying extension: repeated movements  Pain location: no change  Pain intensity: better  Pain level: decreased    Strength/Myotome Testing     Left Hip   Planes of Motion   Flexion: 4-    Right Hip   Planes of Motion   Flexion: 4-    Left Knee   Flexion: 4+  Extension: 4+    Right Knee   Flexion: 4+  Extension: 4+    Left Ankle/Foot   Dorsiflexion: 5  Plantar flexion: 5    Right Ankle/Foot   Dorsiflexion: 5  Plantar flexion: 5    Tests     Lumbar     Left   Positive passive SLR and slump test.     Right   Positive crossed SLR, femoral stretch, passive SLR and slump test.              Precautions: HTN    POC expires Unit limit Auth Expiration date PT/OT + Visit Limit?   07/30/25 tbd tbd bomn                           Visit/Unit Tracking  AUTH Status:  Date 5/7              tbd Used 1               Remaining                        Manuals 5/7            Lumbar/thoracic PA mobs                                                    Neuro Re-Ed             Paloff press              Dead bugs              Supine nerve glides X10 ea - HEP                                                                Ther Ex             Prone press ups  3x10 - HEP             Side plank w/ hip abd             High plank shoulder taps              Leg press                                                                 Ther Activity                                       Gait Training                                       Modalities

## 2025-05-14 ENCOUNTER — OFFICE VISIT (OUTPATIENT)
Dept: PHYSICAL THERAPY | Facility: OTHER | Age: 55
End: 2025-05-14
Payer: COMMERCIAL

## 2025-05-14 DIAGNOSIS — M54.50 ACUTE RIGHT-SIDED LOW BACK PAIN WITHOUT SCIATICA: Primary | ICD-10-CM

## 2025-05-14 PROCEDURE — 97110 THERAPEUTIC EXERCISES: CPT

## 2025-05-14 PROCEDURE — 97112 NEUROMUSCULAR REEDUCATION: CPT

## 2025-05-14 PROCEDURE — 97140 MANUAL THERAPY 1/> REGIONS: CPT

## 2025-05-14 NOTE — PROGRESS NOTES
Daily Note     Today's date: 2025  Patient name: Floridalma Davison  : 1970  MRN: [id number]  Referring provider: Karley Artis CRNP  Dx:   Encounter Diagnosis     ICD-10-CM    1. Acute right-sided low back pain without sciatica  M54.50                      Subjective: Pt states that she feels much better than how she did at the IE.       Objective: See treatment diary below      Assessment: Lumbar/thoracic PA mobs were performed for symptom modulation purposes, with the pt demonstrating a positive response. Dead bugs, high plank shoulder taps, and palloff press was introduced to strengthen the deep core musculature and posterior chain, with the pt being appropriately challenged. Once appropriate, will introduce pt to more functional lifts, such as dead lifts and weighted squats.       Plan: Continue per plan of care.  Progress treatment as tolerated.       Precautions: HTN    POC expires Unit limit Auth Expiration date PT/OT + Visit Limit?   25 tbd tbd bomn                           Visit/Unit Tracking  AUTH Status:  Date              12 Used 1 2              Remaining  11 10                     Manuals            Lumbar/thoracic PA mobs  RM gr III-IV                                                  Neuro Re-Ed             Paloff press   MTB laps till fatigue            Dead bugs   3x10           Supine nerve glides X10 ea - HEP 2x15 ea                                                                Ther Ex             Prone press ups  3x10 - HEP 4x10            Side plank w/ hip abd             High plank shoulder taps   2x10           Leg press             SS w/ TB  MTB till fatigue                                                   Ther Activity                                       Gait Training                                       Modalities

## 2025-05-15 DIAGNOSIS — M25.561 CHRONIC PAIN OF RIGHT KNEE: Primary | ICD-10-CM

## 2025-05-15 DIAGNOSIS — G89.29 CHRONIC PAIN OF RIGHT KNEE: Primary | ICD-10-CM

## 2025-05-21 ENCOUNTER — APPOINTMENT (OUTPATIENT)
Dept: PHYSICAL THERAPY | Facility: OTHER | Age: 55
End: 2025-05-21
Payer: COMMERCIAL

## 2025-06-16 ENCOUNTER — TELEPHONE (OUTPATIENT)
Age: 55
End: 2025-06-16

## (undated) DEVICE — ABDOMINAL PAD: Brand: DERMACEA

## (undated) DEVICE — PACK C-SECTION PBDS

## (undated) DEVICE — SUT VICRYL 0 CTX 36 IN J978H

## (undated) DEVICE — SUT VICRYL 0 CT-1 27 IN J260H

## (undated) DEVICE — Device

## (undated) DEVICE — GAUZE SPONGES,16 PLY: Brand: CURITY

## (undated) DEVICE — SUT VICRYL 2-0 CT-1 36 IN VR945

## (undated) DEVICE — SUT PLAIN 3-0 CTX 27 IN 873H

## (undated) DEVICE — SKIN MARKER DUAL TIP WITH RULER CAP, FLEXIBLE RULER AND LABELS: Brand: DEVON

## (undated) DEVICE — CHLORAPREP HI-LITE 10.5ML ORANGE

## (undated) DEVICE — TELFA NON-ADHERENT ABSORBENT DRESSING: Brand: TELFA

## (undated) DEVICE — SUT MONOCRYL 4-0 PS-2 27 IN Y426H

## (undated) DEVICE — SUT VICRYL 0 CT-1 36 IN J946H

## (undated) DEVICE — SUT PLAIN 3-0 CT-1 27 IN 842H

## (undated) DEVICE — GLOVE PI ULTRA TOUCH SZ.7.0